# Patient Record
Sex: MALE | Race: WHITE | NOT HISPANIC OR LATINO | Employment: OTHER | URBAN - METROPOLITAN AREA
[De-identification: names, ages, dates, MRNs, and addresses within clinical notes are randomized per-mention and may not be internally consistent; named-entity substitution may affect disease eponyms.]

---

## 2018-11-20 ENCOUNTER — OFFICE VISIT (OUTPATIENT)
Dept: URGENT CARE | Facility: CLINIC | Age: 63
End: 2018-11-20
Payer: COMMERCIAL

## 2018-11-20 VITALS
WEIGHT: 188 LBS | RESPIRATION RATE: 18 BRPM | HEART RATE: 104 BPM | BODY MASS INDEX: 29.51 KG/M2 | DIASTOLIC BLOOD PRESSURE: 84 MMHG | SYSTOLIC BLOOD PRESSURE: 146 MMHG | HEIGHT: 67 IN | TEMPERATURE: 98 F | OXYGEN SATURATION: 96 %

## 2018-11-20 DIAGNOSIS — J06.9 UPPER RESPIRATORY TRACT INFECTION, UNSPECIFIED TYPE: Primary | ICD-10-CM

## 2018-11-20 PROCEDURE — G0382 LEV 3 HOSP TYPE B ED VISIT: HCPCS | Performed by: PHYSICIAN ASSISTANT

## 2018-11-20 RX ORDER — ATENOLOL 100 MG/1
50 TABLET ORAL DAILY
COMMUNITY
End: 2020-05-11 | Stop reason: HOSPADM

## 2018-11-20 RX ORDER — SIMVASTATIN 20 MG
1 TABLET ORAL DAILY
COMMUNITY
Start: 2016-01-14 | End: 2019-12-08 | Stop reason: HOSPADM

## 2018-11-20 RX ORDER — HYDROCHLOROTHIAZIDE 25 MG/1
25 TABLET ORAL DAILY
COMMUNITY
End: 2020-05-11 | Stop reason: HOSPADM

## 2018-11-20 RX ORDER — FLUTICASONE PROPIONATE 50 MCG
1 SPRAY, SUSPENSION (ML) NASAL DAILY
Qty: 16 G | Refills: 0 | Status: SHIPPED | OUTPATIENT
Start: 2018-11-20 | End: 2020-05-11 | Stop reason: HOSPADM

## 2018-11-20 RX ORDER — LISINOPRIL 40 MG/1
40 TABLET ORAL DAILY
COMMUNITY
End: 2020-07-09 | Stop reason: SDUPTHER

## 2018-11-20 RX ORDER — DEXTROMETHORPHAN HYDROBROMIDE AND PROMETHAZINE HYDROCHLORIDE 15; 6.25 MG/5ML; MG/5ML
5 SYRUP ORAL 4 TIMES DAILY PRN
Qty: 118 ML | Refills: 0 | Status: SHIPPED | OUTPATIENT
Start: 2018-11-20 | End: 2019-12-08 | Stop reason: HOSPADM

## 2018-11-20 RX ORDER — AMLODIPINE BESYLATE 10 MG/1
10 TABLET ORAL DAILY
COMMUNITY
End: 2020-10-16 | Stop reason: SDUPTHER

## 2018-11-20 NOTE — PATIENT INSTRUCTIONS
Coricidin otc for congestion, can use afrin nasal for 3 days  Follow up with PCP in 5-7  Proceed to  ER if symptoms worsen

## 2018-11-20 NOTE — PROGRESS NOTES
[facility]        NAME: Helen Medina is a 58 y o  male  : 1955    MRN: [id number]  DATE: 2018  TIME: 11:18 AM    Assessment and Plan   Upper respiratory tract infection, unspecified type [J06 9]  1  Upper respiratory tract infection, unspecified type  promethazine-dextromethorphan (PHENERGAN-DM) 6 25-15 mg/5 mL oral syrup    fluticasone (FLONASE) 50 mcg/act nasal spray         Patient Instructions     Coricidin otc for congestion, can use afrin nasal for 3 days  Follow up with PCP in 5-7  Proceed to  ER if symptoms worsen  Chief Complaint     Chief Complaint   Patient presents with    Cough     x 4 days, taking mucinex    Sore Throat    Nasal Congestion    Generalized Body Aches         History of Present Illness         17-year-old male presents with cough and congestion for 4 days  Cough is productive with yellow mucus and associated with a sore throat  It is not associated shortness of breath  He complains of sinus pressure, postnasal drip and ear fullness bilaterally  He complains of body aches  He denies fever/chills, nausea / vomiting  Is currently a smoker          Review of Systems   Review of Systems      Current Medications       Current Outpatient Prescriptions:     amLODIPine (NORVASC) 10 mg tablet, Take 10 mg by mouth daily, Disp: , Rfl:     atenolol (TENORMIN) 100 mg tablet, Take 50 mg by mouth daily, Disp: , Rfl:     hydrochlorothiazide (HYDRODIURIL) 25 mg tablet, Take 25 mg by mouth daily, Disp: , Rfl:     lisinopril (ZESTRIL) 40 mg tablet, Take 40 mg by mouth daily, Disp: , Rfl:     simvastatin (ZOCOR) 20 mg tablet, Take 1 tablet by mouth daily, Disp: , Rfl:     fluticasone (FLONASE) 50 mcg/act nasal spray, 1 spray into each nostril daily, Disp: 16 g, Rfl: 0    promethazine-dextromethorphan (PHENERGAN-DM) 6 25-15 mg/5 mL oral syrup, Take 5 mL by mouth 4 (four) times a day as needed for cough, Disp: 118 mL, Rfl: 0    Current Allergies Allergies as of 11/20/2018    (No Known Allergies)            The following portions of the patient's history were reviewed and updated as appropriate: allergies, current medications, past family history, past medical history, past social history, past surgical history and problem list      Past Medical History:   Diagnosis Date    Hyperlipidemia     Hypertension        Past Surgical History:   Procedure Laterality Date    KNEE SURGERY Right        Family History   Problem Relation Age of Onset    Diabetes Mother     Hyperlipidemia Father          Medications have been verified  Objective   /84 (BP Location: Left arm, Patient Position: Sitting, Cuff Size: Standard)   Pulse 104   Temp 98 °F (36 7 °C) (Tympanic)   Resp 18   Ht 5' 7" (1 702 m)   Wt 85 3 kg (188 lb)   SpO2 96%   BMI 29 44 kg/m²        Physical Exam     Physical Exam   Constitutional: He appears well-developed and well-nourished  No distress  HENT:   Head: Normocephalic and atraumatic  Right Ear: Tympanic membrane, external ear and ear canal normal    Left Ear: Tympanic membrane, external ear and ear canal normal    Nose: Mucosal edema present  No rhinorrhea  Right sinus exhibits no maxillary sinus tenderness and no frontal sinus tenderness  Left sinus exhibits no maxillary sinus tenderness and no frontal sinus tenderness  Mouth/Throat: Mucous membranes are not dry  No oropharyngeal exudate  Congestion, post nasal drip   Eyes: Right eye exhibits no discharge  Left eye exhibits no discharge  No scleral icterus  Neck: Normal range of motion  Neck supple  Cardiovascular: Normal rate and regular rhythm  Exam reveals no gallop and no friction rub  No murmur heard  Pulmonary/Chest: Effort normal and breath sounds normal  No respiratory distress  He has no wheezes  He has no rales  Lymphadenopathy:     He has no cervical adenopathy  Neurological: He is alert  Skin: Skin is warm and dry  He is not diaphoretic  Psychiatric: He has a normal mood and affect   His behavior is normal

## 2019-09-24 ENCOUNTER — APPOINTMENT (EMERGENCY)
Dept: RADIOLOGY | Facility: HOSPITAL | Age: 64
End: 2019-09-24

## 2019-09-24 ENCOUNTER — HOSPITAL ENCOUNTER (EMERGENCY)
Facility: HOSPITAL | Age: 64
Discharge: HOME | End: 2019-09-24
Attending: EMERGENCY MEDICINE

## 2019-09-24 ENCOUNTER — APPOINTMENT (EMERGENCY)
Dept: CT IMAGING | Facility: HOSPITAL | Age: 64
End: 2019-09-24

## 2019-09-24 VITALS
TEMPERATURE: 98.2 F | DIASTOLIC BLOOD PRESSURE: 78 MMHG | SYSTOLIC BLOOD PRESSURE: 178 MMHG | BODY MASS INDEX: 29.03 KG/M2 | RESPIRATION RATE: 16 BRPM | OXYGEN SATURATION: 98 % | WEIGHT: 185 LBS | HEART RATE: 62 BPM | HEIGHT: 67 IN

## 2019-09-24 DIAGNOSIS — R42 VERTIGO: Primary | ICD-10-CM

## 2019-09-24 LAB
ALBUMIN SERPL BCP-MCNC: 4.3 G/DL (ref 3.5–5)
ALP SERPL-CCNC: 66 U/L (ref 46–116)
ALT SERPL W P-5'-P-CCNC: 105 U/L (ref 12–78)
ANION GAP SERPL CALCULATED.3IONS-SCNC: 10 MMOL/L (ref 4–13)
AST SERPL W P-5'-P-CCNC: 56 U/L (ref 5–45)
ATRIAL RATE: 60 BPM
BASOPHILS # BLD AUTO: 0.07 THOUSANDS/ΜL (ref 0–0.1)
BASOPHILS NFR BLD AUTO: 1 % (ref 0–1)
BILIRUB SERPL-MCNC: 0.4 MG/DL (ref 0.2–1)
BUN SERPL-MCNC: 32 MG/DL (ref 5–25)
CALCIUM SERPL-MCNC: 10.1 MG/DL (ref 8.3–10.1)
CHLORIDE SERPL-SCNC: 97 MMOL/L (ref 100–108)
CO2 SERPL-SCNC: 28 MMOL/L (ref 21–32)
CREAT SERPL-MCNC: 1.33 MG/DL (ref 0.6–1.3)
EOSINOPHIL # BLD AUTO: 0.3 THOUSAND/ΜL (ref 0–0.61)
EOSINOPHIL NFR BLD AUTO: 3 % (ref 0–6)
ERYTHROCYTE [DISTWIDTH] IN BLOOD BY AUTOMATED COUNT: 12.8 % (ref 11.6–15.1)
GFR SERPL CREATININE-BSD FRML MDRD: 56 ML/MIN/1.73SQ M
GLUCOSE SERPL-MCNC: 213 MG/DL (ref 65–140)
HCT VFR BLD AUTO: 44.4 % (ref 36.5–49.3)
HGB BLD-MCNC: 14.7 G/DL (ref 12–17)
IMM GRANULOCYTES # BLD AUTO: 0.1 THOUSAND/UL (ref 0–0.2)
IMM GRANULOCYTES NFR BLD AUTO: 1 % (ref 0–2)
LYMPHOCYTES # BLD AUTO: 2.54 THOUSANDS/ΜL (ref 0.6–4.47)
LYMPHOCYTES NFR BLD AUTO: 22 % (ref 14–44)
MCH RBC QN AUTO: 31.7 PG (ref 26.8–34.3)
MCHC RBC AUTO-ENTMCNC: 33.1 G/DL (ref 31.4–37.4)
MCV RBC AUTO: 96 FL (ref 82–98)
MONOCYTES # BLD AUTO: 1.29 THOUSAND/ΜL (ref 0.17–1.22)
MONOCYTES NFR BLD AUTO: 11 % (ref 4–12)
NEUTROPHILS # BLD AUTO: 7.54 THOUSANDS/ΜL (ref 1.85–7.62)
NEUTS SEG NFR BLD AUTO: 62 % (ref 43–75)
NRBC BLD AUTO-RTO: 0 /100 WBCS
P AXIS: 75 DEGREES
PLATELET # BLD AUTO: 153 THOUSANDS/UL (ref 149–390)
PMV BLD AUTO: 12.4 FL (ref 8.9–12.7)
POTASSIUM SERPL-SCNC: 4.2 MMOL/L (ref 3.5–5.3)
PR INTERVAL: 192 MS
PROT SERPL-MCNC: 8 G/DL (ref 6.4–8.2)
QRS AXIS: -6 DEGREES
QRSD INTERVAL: 148 MS
QT INTERVAL: 468 MS
QTC INTERVAL: 468 MS
RBC # BLD AUTO: 4.64 MILLION/UL (ref 3.88–5.62)
SODIUM SERPL-SCNC: 135 MMOL/L (ref 136–145)
T WAVE AXIS: 88 DEGREES
TROPONIN I SERPL-MCNC: <0.02 NG/ML
VENTRICULAR RATE: 60 BPM
WBC # BLD AUTO: 11.84 THOUSAND/UL (ref 4.31–10.16)

## 2019-09-24 PROCEDURE — 36415 COLL VENOUS BLD VENIPUNCTURE: CPT | Performed by: EMERGENCY MEDICINE

## 2019-09-24 PROCEDURE — 80053 COMPREHEN METABOLIC PANEL: CPT | Performed by: EMERGENCY MEDICINE

## 2019-09-24 PROCEDURE — 96361 HYDRATE IV INFUSION ADD-ON: CPT

## 2019-09-24 PROCEDURE — 99285 EMERGENCY DEPT VISIT HI MDM: CPT

## 2019-09-24 PROCEDURE — 70498 CT ANGIOGRAPHY NECK: CPT

## 2019-09-24 PROCEDURE — 99285 EMERGENCY DEPT VISIT HI MDM: CPT | Performed by: EMERGENCY MEDICINE

## 2019-09-24 PROCEDURE — 93005 ELECTROCARDIOGRAM TRACING: CPT

## 2019-09-24 PROCEDURE — 70496 CT ANGIOGRAPHY HEAD: CPT

## 2019-09-24 PROCEDURE — 84484 ASSAY OF TROPONIN QUANT: CPT | Performed by: EMERGENCY MEDICINE

## 2019-09-24 PROCEDURE — 96360 HYDRATION IV INFUSION INIT: CPT

## 2019-09-24 PROCEDURE — 85025 COMPLETE CBC W/AUTO DIFF WBC: CPT | Performed by: EMERGENCY MEDICINE

## 2019-09-24 PROCEDURE — 71046 X-RAY EXAM CHEST 2 VIEWS: CPT

## 2019-09-24 PROCEDURE — 93010 ELECTROCARDIOGRAM REPORT: CPT | Performed by: INTERNAL MEDICINE

## 2019-09-24 RX ORDER — MECLIZINE HYDROCHLORIDE 25 MG/1
25 TABLET ORAL ONCE
Status: COMPLETED | OUTPATIENT
Start: 2019-09-24 | End: 2019-09-24

## 2019-09-24 RX ORDER — ASPIRIN 81 MG/1
81 TABLET, CHEWABLE ORAL DAILY
Qty: 20 TABLET | Refills: 0 | Status: SHIPPED | OUTPATIENT
Start: 2019-09-24

## 2019-09-24 RX ADMIN — MECLIZINE HYDROCHLORIDE 25 MG: 25 TABLET ORAL at 14:31

## 2019-09-24 RX ADMIN — SODIUM CHLORIDE 1000 ML: 0.9 INJECTION, SOLUTION INTRAVENOUS at 14:33

## 2019-09-24 RX ADMIN — IOHEXOL 85 ML: 350 INJECTION, SOLUTION INTRAVENOUS at 15:07

## 2019-10-09 NOTE — ED PROVIDER NOTES
History  Chief Complaint   Patient presents with    Dizziness     pt states it feels like the room is spinning since 0900 this morning     51-year-old male presenting to the emergency department for evaluation of room spinning sensation  Patient has had this sensation since on o'clock this morning  States it is worse with movement  Has some ringing in the ears as well  No headaches, no nausea vomiting, no numbness weakness or tingling  Prior to Admission Medications   Prescriptions Last Dose Informant Patient Reported? Taking? amLODIPine (NORVASC) 10 mg tablet  Self Yes No   Sig: Take 10 mg by mouth daily   atenolol (TENORMIN) 100 mg tablet  Self Yes No   Sig: Take 50 mg by mouth daily   fluticasone (FLONASE) 50 mcg/act nasal spray   No No   Si spray into each nostril daily   hydrochlorothiazide (HYDRODIURIL) 25 mg tablet  Self Yes No   Sig: Take 25 mg by mouth daily   lisinopril (ZESTRIL) 40 mg tablet  Self Yes No   Sig: Take 40 mg by mouth daily   promethazine-dextromethorphan (PHENERGAN-DM) 6 25-15 mg/5 mL oral syrup   No No   Sig: Take 5 mL by mouth 4 (four) times a day as needed for cough   simvastatin (ZOCOR) 20 mg tablet   Yes No   Sig: Take 1 tablet by mouth daily      Facility-Administered Medications: None       Past Medical History:   Diagnosis Date    Hyperlipidemia     Hypertension        Past Surgical History:   Procedure Laterality Date    KNEE SURGERY Right        Family History   Problem Relation Age of Onset    Diabetes Mother     Hyperlipidemia Father      I have reviewed and agree with the history as documented  Social History     Tobacco Use    Smoking status: Current Some Day Smoker     Packs/day: 0 50    Smokeless tobacco: Never Used   Substance Use Topics    Alcohol use: Yes     Comment: 2 mixed drinks/day    Drug use: Not Currently        Review of Systems   Constitutional: Negative for appetite change, chills, fatigue and fever     HENT: Negative for sneezing and sore throat  Eyes: Negative for visual disturbance  Respiratory: Negative for cough, choking, chest tightness, shortness of breath and wheezing  Cardiovascular: Negative for chest pain and palpitations  Gastrointestinal: Negative for abdominal pain, constipation, diarrhea, nausea and vomiting  Genitourinary: Negative for difficulty urinating and dysuria  Neurological: Positive for dizziness  Negative for weakness, light-headedness, numbness and headaches  All other systems reviewed and are negative  Physical Exam  Physical Exam   Constitutional: He is oriented to person, place, and time  He appears well-developed and well-nourished  No distress  HENT:   Head: Normocephalic and atraumatic  Mouth/Throat: Oropharynx is clear and moist    Eyes: Pupils are equal, round, and reactive to light  EOM are normal    Neck: No JVD present  No tracheal deviation present  Cardiovascular: Normal rate, regular rhythm, normal heart sounds and intact distal pulses  Exam reveals no gallop and no friction rub  No murmur heard  Pulmonary/Chest: Effort normal and breath sounds normal  No respiratory distress  He has no wheezes  He has no rales  Abdominal: Soft  Bowel sounds are normal  He exhibits no distension  There is no tenderness  There is no rebound and no guarding  Neurological: He is alert and oriented to person, place, and time  No cranial nerve deficit  He exhibits normal muscle tone  Cranial nerves 2-12 are intact  Strength is 5/5 in all extremities  HINTS reveals some fatigable horizontal nystagmus  Skin: Skin is warm and dry  He is not diaphoretic  No pallor  Psychiatric: He has a normal mood and affect  His behavior is normal    Nursing note and vitals reviewed        Vital Signs  ED Triage Vitals [09/24/19 1155]   Temperature Pulse Respirations Blood Pressure SpO2   98 2 °F (36 8 °C) 63 18 165/87 97 %      Temp Source Heart Rate Source Patient Position - Orthostatic VS BP Location FiO2 (%)   Oral Monitor Sitting Left arm --      Pain Score       No Pain           Vitals:    09/24/19 1155 09/24/19 1713   BP: 165/87 (!) 178/78   Pulse: 63 62   Patient Position - Orthostatic VS: Sitting Sitting         Visual Acuity  Visual Acuity      Most Recent Value   L Pupil Size (mm)  3   R Pupil Size (mm)  3          ED Medications  Medications   sodium chloride 0 9 % bolus 1,000 mL (0 mL Intravenous Stopped 9/24/19 1711)   meclizine (ANTIVERT) tablet 25 mg (25 mg Oral Given 9/24/19 1431)   iohexol (OMNIPAQUE) 350 MG/ML injection (MULTI-DOSE) 85 mL (85 mL Intravenous Given 9/24/19 1507)       Diagnostic Studies  Results Reviewed     Procedure Component Value Units Date/Time    Troponin I [[id number]]  (Normal) Collected:  09/24/19 1422    Lab Status:  Final result Specimen:  Blood from Arm, Right Updated:  09/24/19 1449     Troponin I <0 02 ng/mL     Comprehensive metabolic panel [[id number]]  (Abnormal) Collected:  09/24/19 1422    Lab Status:  Final result Specimen:  Blood from Arm, Right Updated:  09/24/19 1447     Sodium 135 mmol/L      Potassium 4 2 mmol/L      Chloride 97 mmol/L      CO2 28 mmol/L      ANION GAP 10 mmol/L      BUN 32 mg/dL      Creatinine 1 33 mg/dL      Glucose 213 mg/dL      Calcium 10 1 mg/dL      AST 56 U/L       U/L      Alkaline Phosphatase 66 U/L      Total Protein 8 0 g/dL      Albumin 4 3 g/dL      Total Bilirubin 0 40 mg/dL      eGFR 56 ml/min/1 73sq m     Narrative:       Cece guidelines for Chronic Kidney Disease (CKD):     Stage 1 with normal or high GFR (GFR > 90 mL/min/1 73 square meters)    Stage 2 Mild CKD (GFR = 60-89 mL/min/1 73 square meters)    Stage 3A Moderate CKD (GFR = 45-59 mL/min/1 73 square meters)    Stage 3B Moderate CKD (GFR = 30-44 mL/min/1 73 square meters)    Stage 4 Severe CKD (GFR = 15-29 mL/min/1 73 square meters)    Stage 5 End Stage CKD (GFR <15 mL/min/1 73 square meters)  Note: GFR calculation is accurate only with a steady state creatinine    CBC and differential [[id number]]  (Abnormal) Collected:  09/24/19 1422    Lab Status:  Final result Specimen:  Blood from Arm, Right Updated:  09/24/19 1429     WBC 11 84 Thousand/uL      RBC 4 64 Million/uL      Hemoglobin 14 7 g/dL      Hematocrit 44 4 %      MCV 96 fL      MCH 31 7 pg      MCHC 33 1 g/dL      RDW 12 8 %      MPV 12 4 fL      Platelets 508 Thousands/uL      nRBC 0 /100 WBCs      Neutrophils Relative 62 %      Immat GRANS % 1 %      Lymphocytes Relative 22 %      Monocytes Relative 11 %      Eosinophils Relative 3 %      Basophils Relative 1 %      Neutrophils Absolute 7 54 Thousands/µL      Immature Grans Absolute 0 10 Thousand/uL      Lymphocytes Absolute 2 54 Thousands/µL      Monocytes Absolute 1 29 Thousand/µL      Eosinophils Absolute 0 30 Thousand/µL      Basophils Absolute 0 07 Thousands/µL                  CTA head and neck with and without contrast   Final Result by Son Soliz DO (09/24 5928)      Atherosclerotic disease of both distal common carotid arteries and proximal cervical internal carotid arteries  There is approximately 70-75% stenosis at the origin of the internal carotid artery bilaterally  Mild intracranial atherosclerotic disease of the internal carotid arteries involving the cavernous and supraclinoid segments with mild narrowing  Workstation performed: [id number]         XR chest 2 views   Final Result by Magalys Bingham MD (09/24 1647)      No acute cardiopulmonary disease              Workstation performed: [id number]                    Procedures  Procedures       ED Course  ED Course as of Oct 10 1143   Tue Sep 24, 2019   1645 Spoke with Dr Iván reyes of Neurology, reviewed results of the CTA as well as blood work and the patient's clinical status and neurologic exam   Stated that it was very low likelihood to be an acute CVA with an otherwise normal neurologic exam, despite the risk factors identified here today still probably stable for outpatient follow-up, would recommend starting on a daily baby aspirin, and will see the patient in the office  HEART Risk Score      Most Recent Value   History  0 Filed at: 09/24/2019 1418   ECG  1 Filed at: 09/24/2019 1418   Age  1 Filed at: 09/24/2019 1418   Risk Factors  1 Filed at: 09/24/2019 1418   Troponin  0 Filed at: 09/24/2019 1418   Heart Score Risk Calculator   History  0 Filed at: 09/24/2019 1418   ECG  1 Filed at: 09/24/2019 1418   Age  1 Filed at: 09/24/2019 1418   Risk Factors  1 Filed at: 09/24/2019 1418   Troponin  0 Filed at: 09/24/2019 1418   HEART Score  3 Filed at: 09/24/2019 1418   HEART Score  3 Filed at: 09/24/2019 1418                            [facility]  Number of Diagnoses or Management Options  Vertigo:   Diagnosis management comments: 58-year-old male with vertigo, more likely peripheral, will obtain CTA, discussed with Neurology  Disposition  Final diagnoses:   Vertigo     Time reflects when diagnosis was documented in both MDM as applicable and the Disposition within this note     Time User Action Codes Description Comment    9/24/2019  4:46 PM Hari Botello Add [R42] Vertigo       ED Disposition     ED Disposition Condition Date/Time Comment    Discharge Stable Tue Sep 24, 2019  4:46 PM Berny Douglass discharge to home/self care  Follow-up Information     Follow up With Specialties Details Why Contact Info Additional [id number] [place] [facility] [employer] [facility]   [address] [facility]       [address]  Neurology   [address]  [address] [place] [address] Neurology [address], [address], [place], [address] [place] [address]          Discharge Medication List as of 9/24/2019  4:47 PM      START taking these medications    Details   aspirin 81 mg chewable tablet Chew 1 tablet (81 mg total) daily, Starting Tue 9/24/2019, Normal         CONTINUE these medications which have NOT CHANGED    Details   amLODIPine (NORVASC) 10 mg tablet Take 10 mg by mouth daily, Historical Med      atenolol (TENORMIN) 100 mg tablet Take 50 mg by mouth daily, Historical Med      fluticasone (FLONASE) 50 mcg/act nasal spray 1 spray into each nostril daily, Starting Tue 11/20/2018, Normal      hydrochlorothiazide (HYDRODIURIL) 25 mg tablet Take 25 mg by mouth daily, Historical Med      lisinopril (ZESTRIL) 40 mg tablet Take 40 mg by mouth daily, Historical Med      promethazine-dextromethorphan (PHENERGAN-DM) 6 25-15 mg/5 mL oral syrup Take 5 mL by mouth 4 (four) times a day as needed for cough, Starting Tue 11/20/2018, Normal      simvastatin (ZOCOR) 20 mg tablet Take 1 tablet by mouth daily, Starting Thu 1/14/2016, Historical Med           No discharge procedures on file      ED Provider  Electronically Signed by           Arnaldo Garcia MD  10/10/19 5837

## 2019-12-05 ENCOUNTER — APPOINTMENT (EMERGENCY)
Dept: CT IMAGING | Facility: HOSPITAL | Age: 64
DRG: 638 | End: 2019-12-05

## 2019-12-05 ENCOUNTER — HOSPITAL ENCOUNTER (INPATIENT)
Facility: HOSPITAL | Age: 64
LOS: 3 days | Discharge: HOME | DRG: 638 | End: 2019-12-08
Attending: EMERGENCY MEDICINE | Admitting: INTERNAL MEDICINE

## 2019-12-05 DIAGNOSIS — E86.0 DEHYDRATION: ICD-10-CM

## 2019-12-05 DIAGNOSIS — N17.9 AKI (ACUTE KIDNEY INJURY) (HCC): ICD-10-CM

## 2019-12-05 DIAGNOSIS — E11.00 HYPEROSMOLAR NON-KETOTIC STATE IN PATIENT WITH TYPE 2 DIABETES MELLITUS (HCC): Primary | ICD-10-CM

## 2019-12-05 DIAGNOSIS — E78.5 DYSLIPIDEMIA: ICD-10-CM

## 2019-12-05 PROBLEM — I10 ESSENTIAL HYPERTENSION: Status: ACTIVE | Noted: 2019-12-05

## 2019-12-05 PROBLEM — E87.0 HYPEROSMOLAR HYPONATREMIA: Status: ACTIVE | Noted: 2019-12-05

## 2019-12-05 PROBLEM — E87.1 HYPEROSMOLAR HYPONATREMIA: Status: ACTIVE | Noted: 2019-12-05

## 2019-12-05 PROBLEM — R74.8 ABNORMAL TRANSAMINASES: Status: ACTIVE | Noted: 2019-12-05

## 2019-12-05 PROBLEM — D72.829 LEUKOCYTOSIS: Status: ACTIVE | Noted: 2019-12-05

## 2019-12-05 LAB
ALBUMIN SERPL BCP-MCNC: 3.8 G/DL (ref 3.5–5)
ALP SERPL-CCNC: 118 U/L (ref 46–116)
ALT SERPL W P-5'-P-CCNC: 75 U/L (ref 12–78)
ANION GAP SERPL CALCULATED.3IONS-SCNC: 11 MMOL/L (ref 4–13)
ANION GAP SERPL CALCULATED.3IONS-SCNC: 15 MMOL/L (ref 4–13)
ANION GAP SERPL CALCULATED.3IONS-SCNC: 9 MMOL/L (ref 4–13)
APTT PPP: 29 SECONDS (ref 23–37)
AST SERPL W P-5'-P-CCNC: 25 U/L (ref 5–45)
ATRIAL RATE: 60 BPM
BACTERIA UR QL AUTO: ABNORMAL /HPF
BASE EX.OXY STD BLDV CALC-SCNC: 45.4 % (ref 60–80)
BASE EXCESS BLDV CALC-SCNC: -3.4 MMOL/L
BASOPHILS # BLD AUTO: 0.05 THOUSANDS/ΜL (ref 0–0.1)
BASOPHILS NFR BLD AUTO: 0 % (ref 0–1)
BETA-HYDROXYBUTYRATE: 1.1 MMOL/L
BILIRUB SERPL-MCNC: 0.6 MG/DL (ref 0.2–1)
BILIRUB UR QL STRIP: NEGATIVE
BUN SERPL-MCNC: 66 MG/DL (ref 5–25)
BUN SERPL-MCNC: 81 MG/DL (ref 5–25)
BUN SERPL-MCNC: 98 MG/DL (ref 5–25)
CALCIUM SERPL-MCNC: 10.2 MG/DL (ref 8.3–10.1)
CALCIUM SERPL-MCNC: 8.4 MG/DL (ref 8.3–10.1)
CALCIUM SERPL-MCNC: 9.6 MG/DL (ref 8.3–10.1)
CHLORIDE SERPL-SCNC: 79 MMOL/L (ref 100–108)
CHLORIDE SERPL-SCNC: 99 MMOL/L (ref 100–108)
CHLORIDE SERPL-SCNC: 99 MMOL/L (ref 100–108)
CLARITY UR: CLEAR
CO2 SERPL-SCNC: 22 MMOL/L (ref 21–32)
CO2 SERPL-SCNC: 23 MMOL/L (ref 21–32)
CO2 SERPL-SCNC: 26 MMOL/L (ref 21–32)
COLOR UR: YELLOW
CREAT SERPL-MCNC: 1.73 MG/DL (ref 0.6–1.3)
CREAT SERPL-MCNC: 2.11 MG/DL (ref 0.6–1.3)
CREAT SERPL-MCNC: 2.91 MG/DL (ref 0.6–1.3)
EOSINOPHIL # BLD AUTO: 0.14 THOUSAND/ΜL (ref 0–0.61)
EOSINOPHIL NFR BLD AUTO: 1 % (ref 0–6)
ERYTHROCYTE [DISTWIDTH] IN BLOOD BY AUTOMATED COUNT: 12.5 % (ref 11.6–15.1)
GFR SERPL CREATININE-BSD FRML MDRD: 22 ML/MIN/1.73SQ M
GFR SERPL CREATININE-BSD FRML MDRD: 32 ML/MIN/1.73SQ M
GFR SERPL CREATININE-BSD FRML MDRD: 41 ML/MIN/1.73SQ M
GLUCOSE SERPL-MCNC: 1012 MG/DL (ref 65–140)
GLUCOSE SERPL-MCNC: 269 MG/DL (ref 65–140)
GLUCOSE SERPL-MCNC: 329 MG/DL (ref 65–140)
GLUCOSE SERPL-MCNC: 331 MG/DL (ref 65–140)
GLUCOSE SERPL-MCNC: 352 MG/DL (ref 65–140)
GLUCOSE SERPL-MCNC: 378 MG/DL (ref 65–140)
GLUCOSE SERPL-MCNC: 431 MG/DL (ref 65–140)
GLUCOSE SERPL-MCNC: 439 MG/DL (ref 65–140)
GLUCOSE SERPL-MCNC: 464 MG/DL (ref 65–140)
GLUCOSE SERPL-MCNC: 495 MG/DL (ref 65–140)
GLUCOSE SERPL-MCNC: >500 MG/DL (ref 65–140)
GLUCOSE UR STRIP-MCNC: ABNORMAL MG/DL
HCO3 BLDV-SCNC: 24 MMOL/L (ref 24–30)
HCT VFR BLD AUTO: 46.3 % (ref 36.5–49.3)
HGB BLD-MCNC: 14.9 G/DL (ref 12–17)
HGB UR QL STRIP.AUTO: NEGATIVE
IMM GRANULOCYTES # BLD AUTO: 0.1 THOUSAND/UL (ref 0–0.2)
IMM GRANULOCYTES NFR BLD AUTO: 1 % (ref 0–2)
INR PPP: 0.95 (ref 0.84–1.19)
KETONES UR STRIP-MCNC: NEGATIVE MG/DL
LEUKOCYTE ESTERASE UR QL STRIP: ABNORMAL
LYMPHOCYTES # BLD AUTO: 1.59 THOUSANDS/ΜL (ref 0.6–4.47)
LYMPHOCYTES NFR BLD AUTO: 12 % (ref 14–44)
MAGNESIUM SERPL-MCNC: 2.7 MG/DL (ref 1.6–2.6)
MCH RBC QN AUTO: 30.2 PG (ref 26.8–34.3)
MCHC RBC AUTO-ENTMCNC: 32.2 G/DL (ref 31.4–37.4)
MCV RBC AUTO: 94 FL (ref 82–98)
MONOCYTES # BLD AUTO: 0.96 THOUSAND/ΜL (ref 0.17–1.22)
MONOCYTES NFR BLD AUTO: 7 % (ref 4–12)
NEUTROPHILS # BLD AUTO: 10.9 THOUSANDS/ΜL (ref 1.85–7.62)
NEUTS SEG NFR BLD AUTO: 79 % (ref 43–75)
NITRITE UR QL STRIP: NEGATIVE
NON-SQ EPI CELLS URNS QL MICRO: ABNORMAL /HPF
NRBC BLD AUTO-RTO: 0 /100 WBCS
O2 CT BLDV-SCNC: 9 ML/DL
P AXIS: 70 DEGREES
PCO2 BLDV: 52.2 MM HG (ref 42–50)
PH BLDV: 7.28 [PH] (ref 7.3–7.4)
PH UR STRIP.AUTO: 5.5 [PH]
PHOSPHATE SERPL-MCNC: 5.6 MG/DL (ref 2.3–4.1)
PLATELET # BLD AUTO: 142 THOUSANDS/UL (ref 149–390)
PLATELET # BLD AUTO: 155 THOUSANDS/UL (ref 149–390)
PMV BLD AUTO: 13.8 FL (ref 8.9–12.7)
PMV BLD AUTO: 13.8 FL (ref 8.9–12.7)
PO2 BLDV: 24.9 MM HG (ref 35–45)
POTASSIUM SERPL-SCNC: 3.7 MMOL/L (ref 3.5–5.3)
POTASSIUM SERPL-SCNC: 4.2 MMOL/L (ref 3.5–5.3)
POTASSIUM SERPL-SCNC: 5.9 MMOL/L (ref 3.5–5.3)
PR INTERVAL: 176 MS
PROT SERPL-MCNC: 8 G/DL (ref 6.4–8.2)
PROT UR STRIP-MCNC: NEGATIVE MG/DL
PROTHROMBIN TIME: 12.7 SECONDS (ref 11.6–14.5)
QRS AXIS: -19 DEGREES
QRSD INTERVAL: 124 MS
QT INTERVAL: 442 MS
QTC INTERVAL: 442 MS
RBC # BLD AUTO: 4.93 MILLION/UL (ref 3.88–5.62)
RBC #/AREA URNS AUTO: ABNORMAL /HPF
SODIUM SERPL-SCNC: 116 MMOL/L (ref 136–145)
SODIUM SERPL-SCNC: 131 MMOL/L (ref 136–145)
SODIUM SERPL-SCNC: 136 MMOL/L (ref 136–145)
SP GR UR STRIP.AUTO: 1.01 (ref 1–1.03)
T WAVE AXIS: 106 DEGREES
TROPONIN I SERPL-MCNC: <0.02 NG/ML
TSH SERPL DL<=0.05 MIU/L-ACNC: 1.87 UIU/ML (ref 0.36–3.74)
UROBILINOGEN UR QL STRIP.AUTO: 0.2 E.U./DL
VENTRICULAR RATE: 60 BPM
WBC # BLD AUTO: 13.74 THOUSAND/UL (ref 4.31–10.16)
WBC #/AREA URNS AUTO: ABNORMAL /HPF

## 2019-12-05 PROCEDURE — 80053 COMPREHEN METABOLIC PANEL: CPT | Performed by: EMERGENCY MEDICINE

## 2019-12-05 PROCEDURE — 85730 THROMBOPLASTIN TIME PARTIAL: CPT | Performed by: EMERGENCY MEDICINE

## 2019-12-05 PROCEDURE — 84484 ASSAY OF TROPONIN QUANT: CPT | Performed by: EMERGENCY MEDICINE

## 2019-12-05 PROCEDURE — 74176 CT ABD & PELVIS W/O CONTRAST: CPT

## 2019-12-05 PROCEDURE — 87040 BLOOD CULTURE FOR BACTERIA: CPT | Performed by: INTERNAL MEDICINE

## 2019-12-05 PROCEDURE — 82805 BLOOD GASES W/O2 SATURATION: CPT | Performed by: EMERGENCY MEDICINE

## 2019-12-05 PROCEDURE — 85610 PROTHROMBIN TIME: CPT | Performed by: EMERGENCY MEDICINE

## 2019-12-05 PROCEDURE — 70450 CT HEAD/BRAIN W/O DYE: CPT

## 2019-12-05 PROCEDURE — 81001 URINALYSIS AUTO W/SCOPE: CPT | Performed by: EMERGENCY MEDICINE

## 2019-12-05 PROCEDURE — 96361 HYDRATE IV INFUSION ADD-ON: CPT

## 2019-12-05 PROCEDURE — 84100 ASSAY OF PHOSPHORUS: CPT | Performed by: EMERGENCY MEDICINE

## 2019-12-05 PROCEDURE — 84443 ASSAY THYROID STIM HORMONE: CPT | Performed by: INTERNAL MEDICINE

## 2019-12-05 PROCEDURE — 84484 ASSAY OF TROPONIN QUANT: CPT | Performed by: INTERNAL MEDICINE

## 2019-12-05 PROCEDURE — 83735 ASSAY OF MAGNESIUM: CPT | Performed by: EMERGENCY MEDICINE

## 2019-12-05 PROCEDURE — 82948 REAGENT STRIP/BLOOD GLUCOSE: CPT

## 2019-12-05 PROCEDURE — 82010 KETONE BODYS QUAN: CPT | Performed by: EMERGENCY MEDICINE

## 2019-12-05 PROCEDURE — 93005 ELECTROCARDIOGRAM TRACING: CPT

## 2019-12-05 PROCEDURE — 85049 AUTOMATED PLATELET COUNT: CPT | Performed by: INTERNAL MEDICINE

## 2019-12-05 PROCEDURE — 71250 CT THORAX DX C-: CPT

## 2019-12-05 PROCEDURE — 99223 1ST HOSP IP/OBS HIGH 75: CPT | Performed by: INTERNAL MEDICINE

## 2019-12-05 PROCEDURE — 36415 COLL VENOUS BLD VENIPUNCTURE: CPT | Performed by: EMERGENCY MEDICINE

## 2019-12-05 PROCEDURE — 85025 COMPLETE CBC W/AUTO DIFF WBC: CPT | Performed by: EMERGENCY MEDICINE

## 2019-12-05 PROCEDURE — 96374 THER/PROPH/DIAG INJ IV PUSH: CPT

## 2019-12-05 PROCEDURE — 90471 IMMUNIZATION ADMIN: CPT | Performed by: INTERNAL MEDICINE

## 2019-12-05 PROCEDURE — 99285 EMERGENCY DEPT VISIT HI MDM: CPT

## 2019-12-05 PROCEDURE — 80048 BASIC METABOLIC PNL TOTAL CA: CPT | Performed by: INTERNAL MEDICINE

## 2019-12-05 PROCEDURE — 99285 EMERGENCY DEPT VISIT HI MDM: CPT | Performed by: EMERGENCY MEDICINE

## 2019-12-05 PROCEDURE — 93010 ELECTROCARDIOGRAM REPORT: CPT | Performed by: INTERNAL MEDICINE

## 2019-12-05 PROCEDURE — 90682 RIV4 VACC RECOMBINANT DNA IM: CPT | Performed by: INTERNAL MEDICINE

## 2019-12-05 RX ORDER — HEPARIN SODIUM 5000 [USP'U]/ML
5000 INJECTION, SOLUTION INTRAVENOUS; SUBCUTANEOUS EVERY 8 HOURS SCHEDULED
Status: DISCONTINUED | OUTPATIENT
Start: 2019-12-05 | End: 2019-12-08 | Stop reason: HOSPADM

## 2019-12-05 RX ORDER — SODIUM CHLORIDE 9 MG/ML
3 INJECTION INTRAVENOUS AS NEEDED
Status: DISCONTINUED | OUTPATIENT
Start: 2019-12-05 | End: 2019-12-08 | Stop reason: HOSPADM

## 2019-12-05 RX ORDER — NICOTINE 21 MG/24HR
1 PATCH, TRANSDERMAL 24 HOURS TRANSDERMAL DAILY
Status: DISCONTINUED | OUTPATIENT
Start: 2019-12-05 | End: 2019-12-08 | Stop reason: HOSPADM

## 2019-12-05 RX ORDER — FLUTICASONE PROPIONATE 50 MCG
1 SPRAY, SUSPENSION (ML) NASAL DAILY
Status: DISCONTINUED | OUTPATIENT
Start: 2019-12-05 | End: 2019-12-08 | Stop reason: HOSPADM

## 2019-12-05 RX ORDER — ATENOLOL 50 MG/1
50 TABLET ORAL DAILY
Status: DISCONTINUED | OUTPATIENT
Start: 2019-12-05 | End: 2019-12-08 | Stop reason: HOSPADM

## 2019-12-05 RX ORDER — INSULIN GLARGINE 100 [IU]/ML
10 INJECTION, SOLUTION SUBCUTANEOUS ONCE
Status: COMPLETED | OUTPATIENT
Start: 2019-12-05 | End: 2019-12-05

## 2019-12-05 RX ORDER — SODIUM CHLORIDE 9 MG/ML
100 INJECTION, SOLUTION INTRAVENOUS CONTINUOUS
Status: DISCONTINUED | OUTPATIENT
Start: 2019-12-05 | End: 2019-12-07

## 2019-12-05 RX ORDER — ONDANSETRON 2 MG/ML
4 INJECTION INTRAMUSCULAR; INTRAVENOUS EVERY 6 HOURS PRN
Status: DISCONTINUED | OUTPATIENT
Start: 2019-12-05 | End: 2019-12-08 | Stop reason: HOSPADM

## 2019-12-05 RX ORDER — AMLODIPINE BESYLATE 10 MG/1
10 TABLET ORAL DAILY
Status: DISCONTINUED | OUTPATIENT
Start: 2019-12-05 | End: 2019-12-08 | Stop reason: HOSPADM

## 2019-12-05 RX ORDER — ASPIRIN 81 MG/1
81 TABLET, CHEWABLE ORAL DAILY
Status: DISCONTINUED | OUTPATIENT
Start: 2019-12-05 | End: 2019-12-08 | Stop reason: HOSPADM

## 2019-12-05 RX ORDER — INSULIN GLARGINE 100 [IU]/ML
15 INJECTION, SOLUTION SUBCUTANEOUS
Status: DISCONTINUED | OUTPATIENT
Start: 2019-12-05 | End: 2019-12-06

## 2019-12-05 RX ORDER — ACETAMINOPHEN 325 MG/1
650 TABLET ORAL EVERY 6 HOURS PRN
Status: DISCONTINUED | OUTPATIENT
Start: 2019-12-05 | End: 2019-12-08 | Stop reason: HOSPADM

## 2019-12-05 RX ADMIN — SODIUM CHLORIDE 15 UNITS/HR: 9 INJECTION, SOLUTION INTRAVENOUS at 14:30

## 2019-12-05 RX ADMIN — INSULIN LISPRO 12 UNITS: 100 INJECTION, SOLUTION INTRAVENOUS; SUBCUTANEOUS at 20:18

## 2019-12-05 RX ADMIN — HEPARIN SODIUM 5000 UNITS: 5000 INJECTION INTRAVENOUS; SUBCUTANEOUS at 22:06

## 2019-12-05 RX ADMIN — INSULIN GLARGINE 15 UNITS: 100 INJECTION, SOLUTION SUBCUTANEOUS at 22:05

## 2019-12-05 RX ADMIN — SODIUM CHLORIDE 1000 ML: 0.9 INJECTION, SOLUTION INTRAVENOUS at 13:05

## 2019-12-05 RX ADMIN — SODIUM CHLORIDE 200 ML/HR: 0.9 INJECTION, SOLUTION INTRAVENOUS at 22:44

## 2019-12-05 RX ADMIN — INFLUENZA A VIRUS A/BRISBANE/02/2018 (H1N1) RECOMBINANT HEMAGGLUTININ ANTIGEN, INFLUENZA A VIRUS A/KANSAS/14/2017 (H3N2) RECOMBINANT HEMAGGLUTININ ANTIGEN, INFLUENZA B VIRUS B/PHUKET/3073/2013 RECOMBINANT HEMAGGLUTININ ANTIGEN, AND INFLUENZA B VIRUS B/MARYLAND/15/2016 RECOMBINANT HEMAGGLUTININ ANTIGEN 0.5 ML: 45; 45; 45; 45 INJECTION INTRAMUSCULAR at 22:04

## 2019-12-05 RX ADMIN — SODIUM CHLORIDE 1000 ML: 0.9 INJECTION, SOLUTION INTRAVENOUS at 10:36

## 2019-12-05 RX ADMIN — INSULIN LISPRO 12 UNITS: 100 INJECTION, SOLUTION INTRAVENOUS; SUBCUTANEOUS at 15:31

## 2019-12-05 RX ADMIN — SODIUM CHLORIDE 1000 ML: 0.9 INJECTION, SOLUTION INTRAVENOUS at 11:36

## 2019-12-05 RX ADMIN — SODIUM CHLORIDE 200 ML/HR: 0.9 INJECTION, SOLUTION INTRAVENOUS at 15:30

## 2019-12-05 RX ADMIN — HEPARIN SODIUM 5000 UNITS: 5000 INJECTION INTRAVENOUS; SUBCUTANEOUS at 16:41

## 2019-12-05 RX ADMIN — INSULIN HUMAN 10 UNITS: 100 INJECTION, SOLUTION PARENTERAL at 12:14

## 2019-12-05 RX ADMIN — INSULIN GLARGINE 10 UNITS: 100 INJECTION, SOLUTION SUBCUTANEOUS at 15:29

## 2019-12-05 NOTE — ED PROVIDER NOTES
History  Chief Complaint   Patient presents with    Altered Mental Status     brought in by spouse with c/o periods over the last 2 weeks of lethargy, confusion, dizziness and not eating     58 y/o male presents to the ED for altered mental status  Patient's wife reports that over the last 2 weeks- he has had intermittent confusion, slurred speech, and unsteady gait  States that he has been generally week and fatigued as well  Denies any focal n/t/w of his extremities  Patient states that he has also had increased thirst and urinary frequency  He has been noted to have a decreased appetite and about 10 lb weight loss over the last few days  He states that he saw his PCP last week for a cough and was diagnosed with bronchitis- he was given steroids and antibiotics at that time but did not complete the course due to worsening symptoms  He states that he has had blurry vision as well  Denies any headaches or recent falls  No hx of DM in the past- states that he was told he is a prediabetic but is not on any medications for it  He denies any cp, sob, n/v, or d/c  Does state that he has had mild intermittent diffuse abd pain  History provided by:  Patient  Altered Mental Status   Presenting symptoms: confusion and disorientation    Severity:  Moderate  Episode history:  Continuous  Duration:  2 weeks  Timing:  Intermittent  Chronicity:  New  Context: not alcohol use, not drug use, not head injury, taking medications as prescribed and not recent change in medication    Associated symptoms: abdominal pain, slurred speech and visual change    Associated symptoms: no agitation, no fever, no headaches, no nausea, no rash, no vomiting and no weakness        Prior to Admission Medications   Prescriptions Last Dose Informant Patient Reported? Taking?    amLODIPine (NORVASC) 10 mg tablet  Self Yes No   Sig: Take 10 mg by mouth daily   aspirin 81 mg chewable tablet   No No   Sig: Chew 1 tablet (81 mg total) daily   atenolol (TENORMIN) 100 mg tablet  Self Yes No   Sig: Take 50 mg by mouth daily   fluticasone (FLONASE) 50 mcg/act nasal spray   No No   Si spray into each nostril daily   hydrochlorothiazide (HYDRODIURIL) 25 mg tablet  Self Yes No   Sig: Take 25 mg by mouth daily   lisinopril (ZESTRIL) 40 mg tablet  Self Yes No   Sig: Take 40 mg by mouth daily   promethazine-dextromethorphan (PHENERGAN-DM) 6 25-15 mg/5 mL oral syrup   No No   Sig: Take 5 mL by mouth 4 (four) times a day as needed for cough   simvastatin (ZOCOR) 20 mg tablet   Yes No   Sig: Take 1 tablet by mouth daily      Facility-Administered Medications: None       Past Medical History:   Diagnosis Date    Hyperlipidemia     Hypertension        Past Surgical History:   Procedure Laterality Date    KNEE SURGERY Right        Family History   Problem Relation Age of Onset    Diabetes Mother     Hyperlipidemia Father      I have reviewed and agree with the history as documented  Social History     Tobacco Use    Smoking status: Current Some Day Smoker     Packs/day: 0 50    Smokeless tobacco: Never Used   Substance Use Topics    Alcohol use: Yes     Comment: 2 mixed drinks/day    Drug use: Not Currently        Review of Systems   Constitutional: Negative for chills and fever  HENT: Negative for congestion, ear pain and sore throat  Eyes: Negative for pain and visual disturbance  Respiratory: Negative for cough, shortness of breath and wheezing  Cardiovascular: Negative for chest pain and leg swelling  Gastrointestinal: Positive for abdominal pain  Negative for diarrhea, nausea and vomiting  Genitourinary: Negative for dysuria, frequency, hematuria and urgency  Musculoskeletal: Negative for neck pain and neck stiffness  Skin: Negative for rash and wound  Neurological: Positive for speech difficulty  Negative for weakness, numbness and headaches  Psychiatric/Behavioral: Positive for confusion  Negative for agitation     All other systems reviewed and are negative  Physical Exam  Physical Exam   Constitutional: He is oriented to person, place, and time  He appears well-developed and well-nourished  HENT:   Head: Normocephalic and atraumatic  Mouth/Throat: Mucous membranes are dry  Eyes: Pupils are equal, round, and reactive to light  EOM are normal    Neck: Normal range of motion  Neck supple  Cardiovascular: Normal rate and regular rhythm  Pulmonary/Chest: Effort normal and breath sounds normal  No stridor  No respiratory distress  He has no wheezes  He has no rales  Abdominal: Soft  Bowel sounds are normal  He exhibits no distension  There is no tenderness  Musculoskeletal: Normal range of motion  Neurological: He is alert and oriented to person, place, and time  No focal deficits  No slurred speech on exam    Skin: Skin is warm and dry  Nursing note and vitals reviewed        Vital Signs  ED Triage Vitals   Temperature Pulse Respirations Blood Pressure SpO2   12/05/19 0956 12/05/19 0952 12/05/19 0951 12/05/19 0952 12/05/19 0952   97 8 °F (36 6 °C) 65 18 107/55 97 %      Temp Source Heart Rate Source Patient Position - Orthostatic VS BP Location FiO2 (%)   12/05/19 0956 12/05/19 0951 12/05/19 0951 12/05/19 0951 --   Oral Monitor Sitting Left arm       Pain Score       --                  Vitals:    12/05/19 1330 12/05/19 1400 12/05/19 1430 12/05/19 1515   BP: 110/65 107/64 123/59 122/85   Pulse: 58 (!) 54 58 58   Patient Position - Orthostatic VS: Lying Lying Lying          Visual Acuity  Visual Acuity      Most Recent Value   L Pupil Size (mm)  4   R Pupil Size (mm)  4          ED Medications  Medications   sodium chloride (PF) 0 9 % injection 3 mL (has no administration in time range)   sodium chloride 0 9 % infusion (200 mL/hr Intravenous New Bag 12/5/19 1530)   insulin glargine (LANTUS) subcutaneous injection 15 Units 0 15 mL (has no administration in time range)   insulin lispro (HumaLOG) 100 units/mL subcutaneous injection 2-12 Units (12 Units Subcutaneous Given 12/5/19 1531)   amLODIPine (NORVASC) tablet 10 mg (10 mg Oral Not Given 12/5/19 1610)   aspirin chewable tablet 81 mg (81 mg Oral Refused 12/5/19 1611)   atenolol (TENORMIN) tablet 50 mg (50 mg Oral Refused 12/5/19 1611)   fluticasone (FLONASE) 50 mcg/act nasal spray 1 spray (1 spray Nasal Refused 12/5/19 1611)   nicotine (NICODERM CQ) 21 mg/24 hr TD 24 hr patch 1 patch (1 patch Transdermal Not Given 12/5/19 1612)   heparin (porcine) subcutaneous injection 5,000 Units (has no administration in time range)   acetaminophen (TYLENOL) tablet 650 mg (has no administration in time range)   ondansetron (ZOFRAN) injection 4 mg (has no administration in time range)   sodium chloride 0 9 % bolus 1,000 mL (0 mL Intravenous Stopped 12/5/19 1303)   sodium chloride 0 9 % bolus 1,000 mL (0 mL Intravenous Stopped 12/5/19 1136)   insulin regular (HumuLIN R,NovoLIN R) injection 10 Units (10 Units Intravenous Given 12/5/19 1214)   sodium chloride 0 9 % bolus 1,000 mL (0 mL Intravenous Stopped 12/5/19 1521)   insulin glargine (LANTUS) subcutaneous injection 10 Units 0 1 mL (10 Units Subcutaneous Given 12/5/19 1529)       Diagnostic Studies  Results Reviewed     Procedure Component Value Units Date/Time    Troponin I [[id number]] Collected:  12/05/19 1632    Lab Status:  No result Specimen:  Blood from Arm, Left     Basic metabolic panel [[id number]] Collected:  12/05/19 1632    Lab Status:  No result Specimen:  Blood from Arm, Left     Fingerstick Glucose (POCT) [[id number]]  (Abnormal) Collected:  12/05/19 1505    Lab Status:  Final result Updated:  12/05/19 1507     POC Glucose >500 mg/dl     Troponin I [[id number]]     Lab Status:  No result Specimen:  Blood     Blood culture [[id number]]     Lab Status:  No result Specimen:  Blood     Blood culture [[id number]]     Lab Status:  No result Specimen:  Blood     Hemoglobin A1c w/EAG Estimation (Orders if not completed within the last 90 days) [[id number]]     Lab Status:  No result Specimen:  Blood     Fingerstick Glucose (POCT) [[id number]]  (Abnormal) Collected:  12/05/19 1422    Lab Status:  Final result Updated:  12/05/19 1423     POC Glucose >500 mg/dl     Urine Microscopic [[id number]]  (Abnormal) Collected:  12/05/19 1119    Lab Status:  Final result Specimen:  Urine, Other Updated:  12/05/19 1206     RBC, UA None Seen /hpf      WBC, UA 0-1 /hpf      Epithelial Cells Occasional /hpf      Bacteria, UA Occasional /hpf     UA w Reflex to Microscopic w Reflex to Culture [[id number]]  (Abnormal) Collected:  12/05/19 1119    Lab Status:  Final result Specimen:  Urine, Other Updated:  12/05/19 1135     Color, UA Yellow     Clarity, UA Clear     Specific Gravity, UA 1 010     pH, UA 5 5     Leukocytes, UA Elevated glucose may cause decreased leukocyte values   See urine microscopic for [facility] result/     Nitrite, UA Negative     Protein, UA Negative mg/dl      Glucose, UA >=1000 (1%) mg/dl      Ketones, UA Negative mg/dl      Urobilinogen, UA 0 2 E U /dl      Bilirubin, UA Negative     Blood, UA Negative    Comprehensive metabolic panel [[id number]]  (Abnormal) Collected:  12/05/19 1036    Lab Status:  Final result Specimen:  Blood from Arm, Right Updated:  12/05/19 1118     Sodium 116 mmol/L      Potassium 5 9 mmol/L      Chloride 79 mmol/L      CO2 26 mmol/L      ANION GAP 11 mmol/L      BUN 98 mg/dL      Creatinine 2 91 mg/dL      Glucose 1,012 mg/dL      Calcium 10 2 mg/dL      AST 25 U/L      ALT 75 U/L      Alkaline Phosphatase 118 U/L      Total Protein 8 0 g/dL      Albumin 3 8 g/dL      Total Bilirubin 0 60 mg/dL      eGFR 22 ml/min/1 73sq m     Narrative:       Cece guidelines for Chronic Kidney Disease (CKD):     Stage 1 with normal or high GFR (GFR > 90 mL/min/1 73 square meters)    Stage 2 Mild CKD (GFR = 60-89 mL/min/1 73 square meters)    Stage 3A Moderate CKD (GFR = 45-59 mL/min/1 73 square meters)    Stage 3B Moderate CKD (GFR = 30-44 mL/min/1 73 square meters)    Stage 4 Severe CKD (GFR = 15-29 mL/min/1 73 square meters)    Stage 5 End Stage CKD (GFR <15 mL/min/1 73 square meters)  Note: GFR calculation is accurate only with a steady state creatinine    Magnesium [[id number]]  (Abnormal) Collected:  12/05/19 1036    Lab Status:  Final result Specimen:  Blood from Arm, Right Updated:  12/05/19 1115     Magnesium 2 7 mg/dL     Phosphorus [[id number]]  (Abnormal) Collected:  12/05/19 1036    Lab Status:  Final result Specimen:  Blood from Arm, Right Updated:  12/05/19 1115     Phosphorus 5 6 mg/dL     Troponin I [[id number]]  (Normal) Collected:  12/05/19 1036    Lab Status:  Final result Specimen:  Blood from Arm, Right Updated:  12/05/19 1110     Troponin I <0 02 ng/mL     Protime-INR [[id number]]  (Normal) Collected:  12/05/19 1036    Lab Status:  Final result Specimen:  Blood from Arm, Right Updated:  12/05/19 1103     Protime 12 7 seconds      INR 0 95    APTT [[id number]]  (Normal) Collected:  12/05/19 1036    Lab Status:  Final result Specimen:  Blood from Arm, Right Updated:  12/05/19 1103     PTT 29 seconds     CBC and differential [[id number]]  (Abnormal) Collected:  12/05/19 1036    Lab Status:  Final result Specimen:  Blood from Arm, Right Updated:  12/05/19 1055     WBC 13 74 Thousand/uL      RBC 4 93 Million/uL      Hemoglobin 14 9 g/dL      Hematocrit 46 3 %      MCV 94 fL      MCH 30 2 pg      MCHC 32 2 g/dL      RDW 12 5 %      MPV 13 8 fL      Platelets 169 Thousands/uL      nRBC 0 /100 WBCs      Neutrophils Relative 79 %      Immat GRANS % 1 %      Lymphocytes Relative 12 %      Monocytes Relative 7 %      Eosinophils Relative 1 %      Basophils Relative 0 %      Neutrophils Absolute 10 90 Thousands/µL      Immature Grans Absolute 0 10 Thousand/uL      Lymphocytes Absolute 1 59 Thousands/µL      Monocytes Absolute 0 96 Thousand/µL      Eosinophils Absolute 0 14 Thousand/µL      Basophils Absolute 0 05 Thousands/µL     Beta Hydroxybutyrate [[id number]]  (Abnormal) Collected:  12/05/19 1036    Lab Status:  Final result Specimen:  Blood from Arm, Right Updated:  12/05/19 1052     BETA-HYDROXYBUTYRATE 1 1 mmol/L     Blood gas, venous [[id number]]  (Abnormal) Collected:  12/05/19 1036    Lab Status:  Final result Specimen:  Blood from Arm, Right Updated:  12/05/19 1050     pH, Doug 7 280     pCO2, Doug 52 2 mm Hg      pO2, Doug 24 9 mm Hg      HCO3, Doug 24 0 mmol/L      Base Excess, Doug -3 4 mmol/L      O2 Content, Doug 9 0 ml/dL      O2 HGB, VENOUS 45 4 %     Fingerstick Glucose (POCT) [[id number]]  (Abnormal) Collected:  12/05/19 1011    Lab Status:  Final result Updated:  12/05/19 1014     POC Glucose >500 mg/dl                  CT head without contrast   Final Result by Tu Velez DO (12/05 1144)      No acute intracranial abnormality  Workstation performed: [id number]         CT chest abdomen pelvis wo contrast   Final Result by Tu Velez DO (12/05 1213)      No acute cardiopulmonary disease  No acute intra-abdominal abnormality  Abnormal appearance of the gallbladder, completely collapsed and/or sludge filled  No pericholecystic inflammatory changes however  Follow-up ultrasound after adequate fasting may be obtained for clarification  Enlarged fatty liver with subcapsular sparing right hepatic lobe  Prostatomegaly  Colonic diverticulosis without evidence of diverticulitis        Limited study without oral or IV contrast          Workstation performed: [id number]                    Procedures  ECG 12 Lead Documentation Only  Date/Time: 12/5/2019 10:15 AM  Performed by: Catie Hammond DO  Authorized by: Catie Hammond DO     Indications / Diagnosis:  AMS   Patient location:  ED  Previous ECG:     Previous ECG:  Compared to current    Comparison ECG info:  9/24/19    Similarity:  No change  Rate:     ECG rate:  60    ECG rate assessment: normal    Rhythm: Rhythm: sinus rhythm    QRS:     QRS axis:  Left    QRS intervals: Wide  Conduction:     Conduction: abnormal      Abnormal conduction: incomplete LBBB    ST segments:     ST segments: no acute changes   T waves:     T waves comment:  No acute changes              ED Course  ED Course as of Dec 05 1638   Thu Dec 05, 2019   1021 Slurred speech, confused, no appetite, x 2 weeks  Worse over the last few days  Lost 12 lbs in 1 week  Recent diagnosis last week of bronchitis- antibiotics and steroids but stopped Saturday    Increased thirst  Urinary frequency  Unsteady gait  Cough productive- x weeks  Smoker  Blurry vision  1120 Anion Gap: 11   1120 Will replete with fluids    Glucose, Random(!!): 1,012   1120 Corrected sodium is 130   Sodium(!): 116                               MDM  Number of Diagnoses or Management Options  [facility] new and requires workup  Dehydration: new and requires workup  Hyperosmolar non-ketotic state in patient with type 2 diabetes mellitus ([facility] ): new and requires workup  Diagnosis management comments: Patient with multiple complaints- noted to have an accucheck >500- will get labs including acetone, vbg, trop, ct head to r/o icb, and ct pe with abd/pel due to persistent cough and abd pain  Will give fluids and admit  Upon initial attempt to admit, VERÓNICA was concerned that patient may need higher level of care  I spoke with Dr Rupesh Gallagher who looked at patient's labs and saw patient- states that he is appropriate for the floor  Recommends continued fluids and insulin gtt on Algorithm 3  Will admit to [facility]     Patient reevaluated and feels improved  Patient updated on results of tests and plan of care including admission to hospital for further evaluation of presenting complaint  Patient demonstrates verbal understanding and agrees with plan  Report to Dr Ezequiel Rao with VERÓNICA for continuation of patient care           Amount and/or Complexity of Data Reviewed  Clinical lab tests: ordered and reviewed  Tests in the radiology section of CPT®: ordered and reviewed  Tests in the medicine section of CPT®: ordered and reviewed  Discussion of test results with the performing providers: yes  Decide to obtain previous medical records or to obtain history from someone other than the patient: yes  Obtain history from someone other than the patient: yes  Review and summarize past medical records: yes  Discuss the patient with other providers: yes  Independent visualization of images, tracings, or specimens: yes    Patient Progress  Patient progress: improved        Disposition  Final diagnoses:   Hyperosmolar non-ketotic state in patient with type 2 diabetes mellitus ([place] [address] )   JOELLE (acute kidney injury) ([place] [address] )   Dehydration     Time reflects when diagnosis was documented in both MDM as applicable and the Disposition within this note     Time User Action Codes Description Comment    12/5/2019  2:13 PM Sixto Perera 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) ([place] [address] )     12/5/2019  2:13 PM Sixto Perera Remove [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) ([place] [address] )     12/5/2019  2:14 PM Sixto Perera Add [E11 00] Hyperosmolar non-ketotic state in patient with type 2 diabetes mellitus ([place] [address] )     12/5/2019  2:14 PM Sixto Perera Add [N17 9] JOELLE (acute kidney injury) ([place] [address] )     12/5/2019  2:14 PM Sixto Perera Add [E86 0] Dehydration       ED Disposition     ED Disposition Condition Date/Time Comment    Admit Stable Thu Dec 5, 2019  2:13 PM Case was discussed with VERÓNICA and the patient's admission status was agreed to be Admission Status: inpatient status to the service of Dr Chanelle Aguirre             Follow-up Information    None         Current Discharge Medication List      CONTINUE these medications which have NOT CHANGED    Details   amLODIPine (NORVASC) 10 mg tablet Take 10 mg by mouth daily      aspirin 81 mg chewable tablet Chew 1 tablet (81 mg total) daily  Qty: 20 tablet, Refills: 0    Associated Diagnoses: Vertigo      atenolol (TENORMIN) 100 mg tablet Take 50 mg by mouth daily      fluticasone (FLONASE) 50 mcg/act nasal spray 1 spray into each nostril daily  Qty: 16 g, Refills: 0    Associated Diagnoses: Upper respiratory tract infection, unspecified type      hydrochlorothiazide (HYDRODIURIL) 25 mg tablet Take 25 mg by mouth daily      lisinopril (ZESTRIL) 40 mg tablet Take 40 mg by mouth daily      promethazine-dextromethorphan (PHENERGAN-DM) 6 25-15 mg/5 mL oral syrup Take 5 mL by mouth 4 (four) times a day as needed for cough  Qty: 118 mL, Refills: 0    Associated Diagnoses: Upper respiratory tract infection, unspecified type      simvastatin (ZOCOR) 20 mg tablet Take 1 tablet by mouth daily           No discharge procedures on file      ED Provider  Electronically Signed by           Deonte Santos DO  12/05/19 5738

## 2019-12-05 NOTE — ASSESSMENT & PLAN NOTE
Lab Results   Component Value Date    HGBA1C 15 5 (H) 12/06/2019       Recent Labs     12/06/19  2205 12/07/19  0014 12/07/19  0624 12/07/19  1115   POCGLU 183* 207* 232* 350*       Blood Sugar Average: Last 72 hrs:    Hyperosmolar nonketotic state resolved  Accu-Cheks still labile  Put him on more scheduled regular insulin  He does not have any insurance  Trying to get him to 1 of the cheapest available insulin  Discussed with pharmacy    Switched his Lantus to 70/30 insulin available here

## 2019-12-05 NOTE — ED NOTES
Per provider, redraw lactic not to be redrawn at this time  Verbal order to discontinue redraw attempt       Pamela De La Rosa RN  12/05/19 8499

## 2019-12-05 NOTE — PLAN OF CARE
Problem: Potential for Falls  Goal: Patient will remain free of falls  Description  INTERVENTIONS:  - Assess patient frequently for physical needs  -  Identify cognitive and physical deficits and behaviors that affect risk of falls    -  Alicia fall precautions as indicated by assessment   - Educate patient/family on patient safety including physical limitations  - Instruct patient to call for assistance with activity based on assessment  - Modify environment to reduce risk of injury  - Consider OT/PT consult to assist with strengthening/mobility  Outcome: Progressing

## 2019-12-05 NOTE — H&P
History and Physical - [facility]    Patient Information: Bryna Fabry 59 y o  male MRN: [id number]  Unit/Bed#: ZO Encounter: [id number]  Admitting Physician: Jake Harrison MD  PCP: Toma Jalloh  Date of Admission:  12/05/19    Assessment/Plan:      * Hyperosmolar non-ketotic state in patient with type 2 diabetes mellitus [facility])  Assessment & Plan  No results found for: HGBA1C    Recent Labs     12/05/19  1011 12/05/19  1422 12/05/19  1505   POCGLU >500* >500* >500*       Blood Sugar Average: Last 72 hrs:    Patient with family history of diabetes mellitus, and without himself having any history of diabetes mellitus, is here with hyperosmolar nonketotic state  He already has received quite a bit of insulin  Also received IV fluids  He basically needs fluids more than anything else in addition to some insulin  Put him on sliding scale and also long-acting insulin  Give him IV saline and monitor labs every 4 hours from now  Would also monitor him on tele monitor with his hyperkalemia and other electrolytes abnormalities  Leukocytosis  Assessment & Plan  Likely related to stress/reactive  Would hold off on giving him any antibiotics  Continue to monitor  Abnormal transaminases  Assessment & Plan  Plan as above    Dyslipidemia  Assessment & Plan  Has elevated AST/ALT  He had elevated these in the past as well  Would hold his statin  Check lipid profile  May need further workup if levels continue to be abnormal    Hyperosmolar hyponatremia  Assessment & Plan  His corrected sodium is almost 130  Follow-up labs  Has pseudohyponatremia mostly  Would also hold his diuretic    Essential hypertension  Assessment & Plan  Continue some of his home medications including beta-blocker/narcotics  Hold others like ACE-inhibitor/diuretics in the setting of acute kidney injury with hyperkalemia  JOELLE (acute kidney injury) [facility])  Assessment & Plan  Likely hemodynamic    He has some blood work done in September of this year  His creatinine was 1 33  May have underlying chronic kidney disease, however, do not have any other blood work to compare with  His acute kidney injury likely secondary to dehydration and poor oral intake in addition to polyuria  Give him IV fluids and follow-up labs closely  Hold nephrotoxin medications including diuretics/ACE-inhibitor at this moment  Present on Admission:   Hyperosmolar non-ketotic state in patient with type 2 diabetes mellitus ([facility] [address] )   JOELLE (acute kidney injury) ([facility] )   Essential hypertension   Hyperosmolar hyponatremia   Dyslipidemia   Abnormal transaminases   Leukocytosis        VTE Prophylaxis: Heparin  / sequential compression device   Code Status:  Full code  POLST: There is no POLST form on file for this patient (pre-hospital)    Anticipated Length of Stay:  Patient will be admitted on an Inpatient basis with an anticipated length of stay of  more than 2 midnights  Justification for Hospital Stay:  Hyper smaller nonketotic state    Total Time for Visit, including Counseling / Coordination of Care: 50+ minutes  Greater than 50% of this total time spent on direct patient counseling and coordination of care  Chief Complaint:   Not feeling well/confused/polyuria/polydipsia    History of Present Illness:    Vikci Rollins is a 59 y o  male who presents with multiple complaints  Spouse is here at bedside  Patient is not really confused except that he is not really feeling well and generally weak for the last week or so  He was having some bronchitis symptoms and SI a primary care physician who put him on antibiotics in addition to steroids  He did not take all of them as he was not feeling well with continued polyuria/polydipsia  He was feeling dizzy/lightheaded  Had significantly decreased oral intake  No hematemesis, melena, hematochezia  No fever or chills  He also has been trying to quit smoking    He was put on Wellbutrin for that   Patient stop that for some time and then restarted just couple of days ago again  His mother is diabetic  Patient does not get any significant medical care on regular basis because of insurance issues  He was here to the ER not too long ago with symptoms of dizziness/vertigo  Review of Systems    All systems are reviewed  Positive as per history of presenting illness  Patient answered no to all other questions  Past Medical and Surgical History:     Past Medical History:   Diagnosis Date    Hyperlipidemia     Hypertension        Past Surgical History:   Procedure Laterality Date    KNEE SURGERY Right        Meds/Allergies:    Prior to Admission medications    Medication Sig Start Date End Date Taking? Authorizing Provider   amLODIPine (NORVASC) 10 mg tablet Take 10 mg by mouth daily    Historical Provider, MD   aspirin 81 mg chewable tablet Chew 1 tablet (81 mg total) daily 9/24/19   Mariluz Rodriguez MD   atenolol (TENORMIN) 100 mg tablet Take 50 mg by mouth daily    Historical Provider, MD   fluticasone (FLONASE) 50 mcg/act nasal spray 1 spray into each nostril daily 11/20/18   Ham Preston PA-C   hydrochlorothiazide (HYDRODIURIL) 25 mg tablet Take 25 mg by mouth daily    Historical Provider, MD   lisinopril (ZESTRIL) 40 mg tablet Take 40 mg by mouth daily    Historical Provider, MD   promethazine-dextromethorphan (PHENERGAN-DM) 6 25-15 mg/5 mL oral syrup Take 5 mL by mouth 4 (four) times a day as needed for cough 11/20/18   Ham Preston PA-C   simvastatin (ZOCOR) 20 mg tablet Take 1 tablet by mouth daily 1/14/16   Historical Provider, MD     Reviewed as documented above    Allergies: No Known Allergies    Social History:     Marital Status: /Civil Union   Occupation:  Currently not working    He is basically taking care of his parents who are elderly and sick  Patient Pre-hospital Living Situation:  With family  Patient Pre-hospital Level of Mobility:  Independent  Patient Pre-hospital Diet Restrictions:  None  Substance Use History:   Social History     Substance and Sexual Activity   Alcohol Use Yes    Comment: 2 mixed drinks/day     Social History     Tobacco Use   Smoking Status Current Some Day Smoker    Packs/day: 0 50   Smokeless Tobacco Never Used     Social History     Substance and Sexual Activity   Drug Use Not Currently       Family History:    Family history positive for diabetes mellitus  Patient's mother diabetic  Physical Exam      Vitals:   Blood Pressure: 123/59 (12/05/19 1430)  Pulse: 58 (12/05/19 1430)  Temperature: 97 8 °F (36 6 °C) (12/05/19 0956)  Temp Source: Oral (12/05/19 0956)  Respirations: 20 (12/05/19 1430)  Weight - Scale: 83 kg (182 lb 15 7 oz) (12/05/19 0951)  SpO2: 94 % (12/05/19 1430)    Vital signs are reviewed as above  Constitutional:  Patient lying in stretcher in the ER  Eyes:  EOM grossly intact  He wears glasses for reading  HENT: Oropharynx are dry   Did not notice any significant lesions on the tongue  Head normocephalic  Neck: Neck is supple  There is no significant lymphadenopathy  I also did not notice any significant thyromegaly  Cardiac: I did not hear any rubs or gallop  Patient appears to be in sinus rhythm  Respiratory: Patient not in significant respiratory distress  Air entry in general is poor  GI: Abdomen is soft  It is grossly nontender  Bowel sounds are adequate  I was not able to appreciate any hepatosplenomegaly  Neurologic:  Patient is awake and alert  Neurological examination is grossly intact  No obvious focal neurological deficit noticed  Skin: Skin is warm and dry  Psychiatric: Mood and affect are pleasant  Musculoskeletal  Patient moving all extremities while in stretcher in the ER  Has chronic arthritic joints   Extremities: Patient has no significant cyanosis, clubbing, or lower extremity edema           Additional Data:     Lab Results: I have personally reviewed pertinent reports        Results from last 7 days   Lab Units 12/05/19  1036   WBC Thousand/uL 13 74*   HEMOGLOBIN g/dL 14 9   HEMATOCRIT % 46 3   PLATELETS Thousands/uL 155   NEUTROS PCT % 79*   LYMPHS PCT % 12*   MONOS PCT % 7   EOS PCT % 1     Results from last 7 days   Lab Units 12/05/19  1036   POTASSIUM mmol/L 5 9*   CHLORIDE mmol/L 79*   CO2 mmol/L 26   BUN mg/dL 98*   CREATININE mg/dL 2 91*   CALCIUM mg/dL 10 2*   ALK PHOS U/L 118*   ALT U/L 75   AST U/L 25     Results from last 7 days   Lab Units 12/05/19  1036   INR  0 95       Imaging: I have personally reviewed pertinent reports  Ct Chest Abdomen Pelvis Wo Contrast    Result Date: 12/5/2019  Narrative: CT CHEST, ABDOMEN AND PELVIS WITHOUT IV CONTRAST INDICATION:  Cough and mid abdominal pain  COMPARISON:  Chest film 9/24/2019 TECHNIQUE: CT examination of the chest, abdomen and pelvis was performed without intravenous contrast   Axial, sagittal, and coronal 2D reformatted images were created from the source data and submitted for interpretation  Radiation dose length product (DLP) for this visit:  641 mGy-cm   This examination, like all CT scans performed in the [facility], was performed utilizing techniques to minimize radiation dose exposure, including the use of iterative reconstruction and automated exposure control  Enteric contrast was not administered  FINDINGS: CHEST The study is limited without IV contrast  LUNGS:  No consolidation or endobronchial lesions  Calcified right upper lobe granuloma  Lung cysts and/or bullae along the anterior left upper lobe  PLEURA:  Unremarkable  HEART/GREAT VESSELS:  The heart is normal size  No pericardial effusion  Atherosclerotic changes thoracic aorta and coronary arteries  MEDIASTINUM AND PIERRE:  Unremarkable  CHEST WALL AND LOWER NECK:   Unremarkable   ABDOMEN Evaluation of the solid organs is limited without IV contrast  LIVER/BILIARY TREE:  Enlarged fatty liver with patchy areas of subcapsular fatty sparing in the right hepatic lobe  GALLBLADDER:  3 cm rounded soft tissue density along the medial margin of the liver appears to have a tubular connection to the biliary tree and is believed to represent a completely collapsed or sludge filled gallbladder  No pericholecystic inflammatory changes  SPLEEN:  Unremarkable  PANCREAS:  Mild fatty involution of the pancreas without acute findings  ADRENAL GLANDS:  Unremarkable  KIDNEYS/URETERS:  Small lower pole right renal cyst   Evaluation limited without contrast   No hydronephrosis or calculi  Mild nonspecific bilateral perinephric edema  STOMACH AND BOWEL:  The stomach is unremarkable within limitations  The small bowel is normal caliber  Descending and sigmoid colon diverticulosis without evidence of diverticulitis  APPENDIX:  No findings to suggest appendicitis  ABDOMINOPELVIC CAVITY:  No ascites or free intraperitoneal air  No lymphadenopathy  VESSELS: Atherosclerotic changes abdominal aorta without evidence of aneurysm  PELVIS REPRODUCTIVE ORGANS:  The prostate is enlarged  URINARY BLADDER:  Unremarkable  ABDOMINAL WALL/INGUINAL REGIONS:  Fat containing bilateral inguinal hernias noted  OSSEOUS STRUCTURES:  No acute fracture or destructive osseous lesion  Lower lumbar facet arthropathy  Please note the L1 vertebra demonstrates a left-sided rib  Degenerative disease C6-7  Impression: No acute cardiopulmonary disease  No acute intra-abdominal abnormality  Abnormal appearance of the gallbladder, completely collapsed and/or sludge filled  No pericholecystic inflammatory changes however  Follow-up ultrasound after adequate fasting may be obtained for clarification  Enlarged fatty liver with subcapsular sparing right hepatic lobe  Prostatomegaly  Colonic diverticulosis without evidence of diverticulitis   Limited study without oral or IV contrast  Workstation performed: [id number]     Ct Head Without Contrast    Result Date: 12/5/2019  Narrative: CT BRAIN - WITHOUT CONTRAST INDICATION:   Confusion/slurred speech  COMPARISON:  CTA head and neck 9/24/2019 TECHNIQUE:  CT examination of the brain was performed  In addition to axial images, coronal 2D reformatted images were created and submitted for interpretation  Radiation dose length product (DLP) for this visit:  0699 646 28 85 mGy-cm   This examination, like all CT scans performed in the [facility], was performed utilizing techniques to minimize radiation dose exposure, including the use of iterative reconstruction and automated exposure control  IMAGE QUALITY:  Diagnostic  FINDINGS: PARENCHYMA:  No intracranial mass, mass effect or midline shift  No CT signs of acute infarction  No acute parenchymal hemorrhage  Mild to moderate cerebral atrophy  Vascular calcifications  VENTRICLES AND EXTRA-AXIAL SPACES:  Normal for the patient's age  VISUALIZED ORBITS AND PARANASAL SINUSES:  No acute abnormality involving the orbits  Mild mucosal thickening right maxillary sinus, improved from prior study  No fluid levels are seen  CALVARIUM AND EXTRACRANIAL SOFT TISSUES:  Normal      Impression: No acute intracranial abnormality  Workstation performed: [id number]       EKG, Pathology, and Other Studies Reviewed on Admission:   · EKG:  Sinus rhythm  Rate about 60  Do not see any acute ischemic changes  Allscripts Records Reviewed: No     ** Please Note: Dragon 360 Dictation voice to text software may have been used in the creation of this document   **

## 2019-12-06 LAB
ALBUMIN SERPL BCP-MCNC: 2.8 G/DL (ref 3.5–5)
ALP SERPL-CCNC: 58 U/L (ref 46–116)
ALT SERPL W P-5'-P-CCNC: 57 U/L (ref 12–78)
ANION GAP SERPL CALCULATED.3IONS-SCNC: 13 MMOL/L (ref 4–13)
AST SERPL W P-5'-P-CCNC: 29 U/L (ref 5–45)
BILIRUB SERPL-MCNC: 0.4 MG/DL (ref 0.2–1)
BUN SERPL-MCNC: 53 MG/DL (ref 5–25)
CALCIUM SERPL-MCNC: 8.1 MG/DL (ref 8.3–10.1)
CHLORIDE SERPL-SCNC: 101 MMOL/L (ref 100–108)
CHOLEST SERPL-MCNC: 283 MG/DL (ref 50–200)
CO2 SERPL-SCNC: 21 MMOL/L (ref 21–32)
CREAT SERPL-MCNC: 1.46 MG/DL (ref 0.6–1.3)
ERYTHROCYTE [DISTWIDTH] IN BLOOD BY AUTOMATED COUNT: 12.4 % (ref 11.6–15.1)
EST. AVERAGE GLUCOSE BLD GHB EST-MCNC: 398 MG/DL
GFR SERPL CREATININE-BSD FRML MDRD: 50 ML/MIN/1.73SQ M
GLUCOSE SERPL-MCNC: 174 MG/DL (ref 65–140)
GLUCOSE SERPL-MCNC: 183 MG/DL (ref 65–140)
GLUCOSE SERPL-MCNC: 209 MG/DL (ref 65–140)
GLUCOSE SERPL-MCNC: 214 MG/DL (ref 65–140)
GLUCOSE SERPL-MCNC: 215 MG/DL (ref 65–140)
GLUCOSE SERPL-MCNC: 225 MG/DL (ref 65–140)
GLUCOSE SERPL-MCNC: 234 MG/DL (ref 65–140)
GLUCOSE SERPL-MCNC: 247 MG/DL (ref 65–140)
GLUCOSE SERPL-MCNC: 268 MG/DL (ref 65–140)
GLUCOSE SERPL-MCNC: 275 MG/DL (ref 65–140)
GLUCOSE SERPL-MCNC: 290 MG/DL (ref 65–140)
GLUCOSE SERPL-MCNC: 383 MG/DL (ref 65–140)
HBA1C MFR BLD: 15.5 % (ref 4.2–6.3)
HCT VFR BLD AUTO: 37.9 % (ref 36.5–49.3)
HDLC SERPL-MCNC: 28 MG/DL
HGB BLD-MCNC: 12.3 G/DL (ref 12–17)
LIPASE SERPL-CCNC: 339 U/L (ref 73–393)
MCH RBC QN AUTO: 30.4 PG (ref 26.8–34.3)
MCHC RBC AUTO-ENTMCNC: 32.5 G/DL (ref 31.4–37.4)
MCV RBC AUTO: 94 FL (ref 82–98)
NONHDLC SERPL-MCNC: 255 MG/DL
PLATELET # BLD AUTO: 119 THOUSANDS/UL (ref 149–390)
PMV BLD AUTO: 14 FL (ref 8.9–12.7)
POTASSIUM SERPL-SCNC: 3.7 MMOL/L (ref 3.5–5.3)
PROT SERPL-MCNC: 5.9 G/DL (ref 6.4–8.2)
RBC # BLD AUTO: 4.05 MILLION/UL (ref 3.88–5.62)
SODIUM SERPL-SCNC: 135 MMOL/L (ref 136–145)
TRIGL SERPL-MCNC: 1737 MG/DL
WBC # BLD AUTO: 11.96 THOUSAND/UL (ref 4.31–10.16)

## 2019-12-06 PROCEDURE — 80053 COMPREHEN METABOLIC PANEL: CPT | Performed by: INTERNAL MEDICINE

## 2019-12-06 PROCEDURE — 83690 ASSAY OF LIPASE: CPT | Performed by: INTERNAL MEDICINE

## 2019-12-06 PROCEDURE — 85027 COMPLETE CBC AUTOMATED: CPT | Performed by: INTERNAL MEDICINE

## 2019-12-06 PROCEDURE — G8978 MOBILITY CURRENT STATUS: HCPCS

## 2019-12-06 PROCEDURE — G8979 MOBILITY GOAL STATUS: HCPCS

## 2019-12-06 PROCEDURE — 80061 LIPID PANEL: CPT | Performed by: INTERNAL MEDICINE

## 2019-12-06 PROCEDURE — 82948 REAGENT STRIP/BLOOD GLUCOSE: CPT

## 2019-12-06 PROCEDURE — 99233 SBSQ HOSP IP/OBS HIGH 50: CPT | Performed by: INTERNAL MEDICINE

## 2019-12-06 PROCEDURE — 83036 HEMOGLOBIN GLYCOSYLATED A1C: CPT | Performed by: INTERNAL MEDICINE

## 2019-12-06 PROCEDURE — 97163 PT EVAL HIGH COMPLEX 45 MIN: CPT

## 2019-12-06 RX ORDER — AMOXICILLIN 250 MG
1 CAPSULE ORAL
Status: DISCONTINUED | OUTPATIENT
Start: 2019-12-06 | End: 2019-12-08 | Stop reason: HOSPADM

## 2019-12-06 RX ORDER — POLYETHYLENE GLYCOL 3350 17 G/17G
17 POWDER, FOR SOLUTION ORAL DAILY
Status: DISCONTINUED | OUTPATIENT
Start: 2019-12-06 | End: 2019-12-08 | Stop reason: HOSPADM

## 2019-12-06 RX ORDER — INSULIN GLARGINE 100 [IU]/ML
22 INJECTION, SOLUTION SUBCUTANEOUS
Status: DISCONTINUED | OUTPATIENT
Start: 2019-12-06 | End: 2019-12-07

## 2019-12-06 RX ORDER — FENOFIBRATE 145 MG/1
145 TABLET, COATED ORAL DAILY
Status: DISCONTINUED | OUTPATIENT
Start: 2019-12-06 | End: 2019-12-08 | Stop reason: HOSPADM

## 2019-12-06 RX ADMIN — POLYETHYLENE GLYCOL 3350 17 G: 17 POWDER, FOR SOLUTION ORAL at 11:46

## 2019-12-06 RX ADMIN — INSULIN GLARGINE 22 UNITS: 100 INJECTION, SOLUTION SUBCUTANEOUS at 22:05

## 2019-12-06 RX ADMIN — HEPARIN SODIUM 5000 UNITS: 5000 INJECTION INTRAVENOUS; SUBCUTANEOUS at 05:28

## 2019-12-06 RX ADMIN — SENNOSIDES AND DOCUSATE SODIUM 1 TABLET: 8.6; 5 TABLET ORAL at 22:09

## 2019-12-06 RX ADMIN — INSULIN LISPRO 4 UNITS: 100 INJECTION, SOLUTION INTRAVENOUS; SUBCUTANEOUS at 03:46

## 2019-12-06 RX ADMIN — SODIUM CHLORIDE 100 ML/HR: 0.9 INJECTION, SOLUTION INTRAVENOUS at 20:55

## 2019-12-06 RX ADMIN — INSULIN LISPRO 6 UNITS: 100 INJECTION, SOLUTION INTRAVENOUS; SUBCUTANEOUS at 00:25

## 2019-12-06 RX ADMIN — HEPARIN SODIUM 5000 UNITS: 5000 INJECTION INTRAVENOUS; SUBCUTANEOUS at 22:09

## 2019-12-06 RX ADMIN — INSULIN LISPRO 7 UNITS: 100 INJECTION, SOLUTION INTRAVENOUS; SUBCUTANEOUS at 16:32

## 2019-12-06 RX ADMIN — INSULIN LISPRO 4 UNITS: 100 INJECTION, SOLUTION INTRAVENOUS; SUBCUTANEOUS at 08:51

## 2019-12-06 RX ADMIN — ASPIRIN 81 MG 81 MG: 81 TABLET ORAL at 08:51

## 2019-12-06 RX ADMIN — INSULIN LISPRO 7 UNITS: 100 INJECTION, SOLUTION INTRAVENOUS; SUBCUTANEOUS at 12:30

## 2019-12-06 RX ADMIN — INSULIN LISPRO 1 UNITS: 100 INJECTION, SOLUTION INTRAVENOUS; SUBCUTANEOUS at 22:12

## 2019-12-06 RX ADMIN — INSULIN LISPRO 6 UNITS: 100 INJECTION, SOLUTION INTRAVENOUS; SUBCUTANEOUS at 16:32

## 2019-12-06 RX ADMIN — FENOFIBRATE 145 MG: 145 TABLET, COATED ORAL at 09:30

## 2019-12-06 RX ADMIN — INSULIN LISPRO 6 UNITS: 100 INJECTION, SOLUTION INTRAVENOUS; SUBCUTANEOUS at 12:15

## 2019-12-06 RX ADMIN — HEPARIN SODIUM 5000 UNITS: 5000 INJECTION INTRAVENOUS; SUBCUTANEOUS at 13:29

## 2019-12-06 RX ADMIN — ATENOLOL 50 MG: 50 TABLET ORAL at 08:51

## 2019-12-06 RX ADMIN — SODIUM CHLORIDE 200 ML/HR: 0.9 INJECTION, SOLUTION INTRAVENOUS at 03:50

## 2019-12-06 RX ADMIN — AMLODIPINE BESYLATE 10 MG: 10 TABLET ORAL at 08:51

## 2019-12-06 NOTE — PHYSICAL THERAPY NOTE
PT Evaluation (17min)  (10:15-10:32)    Past Medical History:   Diagnosis Date    Hyperlipidemia     Hypertension         12/06/19 1028   Note Type   Note type Eval only   Pain Assessment   Pain Assessment No/denies pain   Home Living   Type of Home Apartment   Home Layout One level   Bathroom Shower/Tub Walk-in shower   Bathroom Toilet Standard   Prior Function   Level of [place] Independent with ADLs and functional mobility   Lives With Spouse   Receives Help From Family   ADL Assistance Independent   IADLs Independent   Falls in the last 6 months 0   Vocational Retired   Comments (+) drives   Restrictions/Precautions   Wells Stevie Bearing Precautions Per Order No   Other Precautions Multiple lines; Fall Risk   General   Additional Pertinent History pt presents to [facility] c new onset DM 2  PT consulted for mobility + d/c planning  up c (A)  Family/Caregiver Present No   Cognition   Orientation Level Oriented X4   RUE Assessment   RUE Assessment WFL  (4/5)   LUE Assessment   LUE Assessment WFL  (4/5)   RLE Assessment   RLE Assessment WFL  (4+/5)   LLE Assessment   LLE Assessment WFL  (4+/5)   Coordination   Sensation WFL   Bed Mobility   Supine to Sit 6  Modified independent   Additional items HOB elevated   Sit to Supine 6  Modified independent   Additional items HOB elevated   Transfers   Sit to Stand 5  Supervision   Additional items Verbal cues; Increased time required   Stand to Sit 5  Supervision   Additional items Verbal cues; Increased time required   Ambulation/Elevation   Gait pattern Forward Flexion;Decreased foot clearance   Gait Assistance 5  Supervision   Additional items Verbal cues   Assistive Device Other (Comment)  (IV pole)   Distance 15'x2 c seated rest for toileting; pt deferred further mobility 2* fatigue   Stair Management Assistance Not tested   Balance   Static Sitting Good   Dynamic Sitting Good   Static Standing Fair +   Dynamic Standing Fair +   Ambulatory Fair   Activity Tolerance Activity Tolerance Patient limited by fatigue   Nurse Made Aware JOSE Horne aware pt ambulated c (S) + IV pole <> bathroom  Assessment   Prognosis Good   Problem List Decreased strength;Decreased endurance; Impaired balance;Decreased mobility   Assessment pt is a 63y/o m who presents to [facility] c new onset DM 2  PMH significant for JOELLE, leukocytosis, dyslipidemia, + HTN  at baseline, pt (I) c functional mobility tasks s AD  resides c spouse in apt c 0 NATASHA  currently presents c deficits in strength, balance, gait quality, + activity tolerance noted in PT exam above  Barthel Index 60/100  ambulated 15'x2 c (S) + support of IV pole c seated rest for toileting  further mobility deferred 2* fatigue  pt resting comfortably in supine at end of session c all needs in reach  would benefit from skilled PT to maximize functional mobility + return home safely  upon d/c, recommend pt return home c family support once medically cleared by MD  will follow up c pt during hospital course  PT eval of high complexity 2* unstable med status c pt cont to undergo workup 2* new onset DM 2  pt c multiple co-morbidities + mobility deficits above  remains c multiple lines  Barriers to Discharge None   Goals   Patient Goals "to go home"  STG Expiration Date 12/13/19   Short Term Goal #1 1  increase strength 1/2 grade to improve overall functional mobility, 2  perform transfers mod (I) to safely perform ADLs, 3  ambulate >300' (I) to safely ambulate in community   Plan   Treatment/Interventions LE strengthening/ROM; Therapeutic exercise; Functional transfer training; Endurance training;Patient/family training;Bed mobility;Gait training;Spoke to nursing   PT Frequency Follow-up visit only   Recommendation   Recommendation Home with family support   PT - OK to Discharge Yes   Modified [place] Scale   Modified [place] Scale 2   Barthel Index   Feeding 10   Bathing 0   Grooming Score 5   Dressing Score 10   Bladder Score 10   Bowels Score 10 Toilet Use Score 5   Transfers (Bed/Chair) Score 10   Mobility (Level Surface) Score 0   Stairs Score 0   Barthel Index Score 60     Oral Baker, PT, DPT

## 2019-12-06 NOTE — UTILIZATION REVIEW
Initial Clinical Review    Admission: Date/Time/Statement: Inpatient Admission Orders (From admission, onward)     Ordered        12/05/19 1412  Inpatient Admission  Once                   Orders Placed This Encounter   Procedures    Inpatient Admission     Standing Status:   Standing     Number of Occurrences:   1     Order Specific Question:   Admitting Physician     Answer:   Marianela Iyer     Order Specific Question:   Level of Care     Answer:   Med Surg [16]     Order Specific Question:   Estimated length of stay     Answer:   More than 2 Midnights     Order Specific Question:   Certification     Answer:   I certify that inpatient services are medically necessary for this patient for a duration of greater than two midnights  See H&P and MD Progress Notes for additional information about the patient's course of treatment  ED Arrival Information     Expected Arrival Acuity Means of Arrival Escorted By Service Admission Type    - 12/5/2019 09:47 Emergent Walk-In Family Member Hospitalist Emergency    Arrival Complaint    AMS        Chief Complaint   Patient presents with    Altered Mental Status     brought in by spouse with c/o periods over the last 2 weeks of lethargy, confusion, dizziness and not eating     Assessment/Plan:  58 yo male,  To ER from home,  Admitted INPT status at [facility] level of care for treatment of  Hyperosmolar non-ketoacidosis w/T2DM (new dx)  and JOELLE  Most likely related to poor oral intake and polyuria  Per wife,  Pt w/intermittent confusion, slurred speech, unsteady gait over past 2 wks; She has also noticed increased thirst and urinary frequency, approx 10 lb wgt loss  Saw PCP last week, dx w/bronchitis, given steroids and abx but stopped taking them 11/29  Due to "worsening symptoms"  Denies recent alcohol use or injury to head  H/o "pre diabetes" - on no DM meds  Prior elevated AST/ALT  Prior crt on 9/2019  was 1 33       EXAM - alert and oriented to person, place, and time  No focal deficits  No slurred speech on exam   CT head neg  Accuck >500  Crt elevated   Leukocytosis (most likely reactive)   Pseudohyponatremia noted  Will place pt on telemetry,  initiate continuous insulin gtt,  Provide aggressive IVF hydration,  Monitor labs q 4 hrs;  Hold lasix & nephrotoxins (including ACE-I)            12/6    Change accucks/SSI   from q 4 hr to  Ac/HS  Add scheduled regular insulin in addition to lantus  Advance diet  Resume ACE-inhibitor when creatinine is better       ED Triage Vitals   Temperature Pulse Respirations Blood Pressure SpO2   12/05/19 0956 12/05/19 0952 12/05/19 0951 12/05/19 0952 12/05/19 0952   97 8 °F (36 6 °C) 65 18 107/55 97 %      Temp Source Heart Rate Source Patient Position - Orthostatic VS BP Location FiO2 (%)   12/05/19 0956 12/05/19 0951 12/05/19 0951 12/05/19 0951 --   Oral Monitor Sitting Left arm       Pain Score       12/05/19 1530       No Pain        Wt Readings from Last 1 Encounters:   12/06/19 83 7 kg (184 lb 8 4 oz)     Additional Vital Signs:   12/05/19 1330 58  24Abnormal   110/65   12/05/19 1100 55  23Abnormal   108/56   12/05/19 1030 57  24Abnormal   127/57     12/06/19 11:59:39  97 6 °F (36 4 °C)  64  18  105/69       Pertinent Labs/Diagnostic Test Results:     12/5  CT Head -   No acute intracranial abnormality  12/5  CTAP -  No acute cardiopulmonary disease  Enlarged fatty liver with subcapsular sparing right hepatic lobe,  Colonic diverticulosis without evidence of diverticulitis      12/5  EKG -  SR w/incomplete LBBB       Results from last 7 days   Lab Units 12/06/19  0446 12/05/19  1632 12/05/19  1036   WBC Thousand/uL 11 96*  --  13 74*   HEMOGLOBIN g/dL 12 3  --  14 9   HEMATOCRIT % 37 9  --  46 3   PLATELETS Thousands/uL 119* 142* 155   NEUTROS ABS Thousands/µL  --   --  10 90*         Results from last 7 days   Lab Units 12/06/19  0446 12/05/19 2217 12/05/19  1632 12/05/19  1036   SODIUM mmol/L 135* 131* 136 116*   POTASSIUM mmol/L 3 7 3 7 4 2 5 9*   CHLORIDE mmol/L 101 99* 99* 79*   CO2 mmol/L 21 23 22 26   ANION GAP mmol/L 13 9 15* 11   BUN mg/dL 53* 66* 81* 98*   CREATININE mg/dL 1 46* 1 73* 2 11* 2 91*   EGFR ml/min/1 73sq m 50 41 32 22   CALCIUM mg/dL 8 1* 8 4 9 6 10 2*   MAGNESIUM mg/dL  --   --   --  2 7*   PHOSPHORUS mg/dL  --   --   --  5 6*     Results from last 7 days   Lab Units 12/06/19  0446 12/05/19  1036   AST U/L 29 25   ALT U/L 57 75   ALK PHOS U/L 58 118*   TOTAL PROTEIN g/dL 5 9* 8 0   ALBUMIN g/dL 2 8* 3 8   TOTAL BILIRUBIN mg/dL 0 40 0 60     Results from last 7 days   Lab Units 12/06/19  1156 12/06/19  1032 12/06/19  0839 12/06/19  0611 12/06/19  0405 12/06/19  0338 12/06/19  0157 12/06/19  0023 12/05/19  2358 12/05/19  2212   POC GLUCOSE mg/dl 290* 383* 247* 209* 215* 214* 234* 275* 269* 329*     Results from last 7 days   Lab Units 12/06/19  0446 12/05/19  2217 12/05/19  1632 12/05/19  1036   GLUCOSE RANDOM mg/dL 225* 331* 378* 1,012*         Results from last 7 days   Lab Units 12/06/19  0937   HEMOGLOBIN A1C % 15 5*   EAG mg/dl 398     BETA-HYDROXYBUTYRATE   Date Value Ref Range Status   12/05/2019 1 1 (H) <0 6 mmol/L Final          Results from last 7 days   Lab Units 12/05/19  1036   PH MANA  7 280*   PCO2 MANA mm Hg 52 2*   PO2 MANA mm Hg 24 9*   HCO3 MANA mmol/L 24 0   BASE EXC MANA mmol/L -3 4   O2 CONTENT MANA ml/dL 9 0   O2 HGB, VENOUS % 45 4*     Results from last 7 days   Lab Units 12/05/19  1947 12/05/19  1632 12/05/19  1036   TROPONIN I ng/mL <0 02 <0 02 <0 02     Results from last 7 days   Lab Units 12/05/19  1036   PROTIME seconds 12 7   INR  0 95   PTT seconds 29     Results from last 7 days   Lab Units 12/05/19  1632 TSH 3RD GENERATON uIU/mL 1 868     Results from last 7 days   Lab Units 12/06/19  0937   LIPASE u/L 339         Results from last 7 days   Lab Units 12/05/19  1119   CLARITY UA  Clear   COLOR UA  Yellow   SPEC GRAV UA  1 010   PH UA  5 5   GLUCOSE UA mg/dl >=1000 (1%)*   KETONES UA mg/dl Negative   BLOOD UA  Negative   PROTEIN UA mg/dl Negative   NITRITE UA  Negative   BILIRUBIN UA  Negative   UROBILINOGEN UA E U /dl 0 2   LEUKOCYTES UA  Elevated glucose may cause decreased leukocyte values  See urine microscopic for [facility] result/*   WBC UA /hpf 0-1*   RBC UA /hpf None Seen   BACTERIA UA /hpf Occasional   EPITHELIAL CELLS WET PREP /hpf Occasional         Results from last 7 days   Lab Units 12/05/19  1950 12/05/19  1948   BLOOD CULTURE  Received in Microbiology Lab  Culture in Progress  Received in Microbiology Lab  Culture in Progress     ED Treatment:   Medication Administration from 12/05/2019 0947 to 12/05/2019 1512       Date/Time Order Dose Route Action     12/05/2019 1036 sodium chloride 0 9 % bolus 1,000 mL 1,000 mL Intravenous New Bag     12/05/2019 1136 sodium chloride 0 9 % bolus 1,000 mL 1,000 mL Intravenous New Bag     12/05/2019 1214 insulin regular (HumuLIN R,NovoLIN R) injection 10 Units 10 Units Intravenous Given     12/05/2019 1305 sodium chloride 0 9 % bolus 1,000 mL 1,000 mL Intravenous New Bag     12/05/2019 1430 insulin regular (HumuLIN R,NovoLIN R) 1 Units/mL in sodium chloride 0 9 % 100 mL infusion 15 Units/hr Intravenous New Bag     12/5  Continuous regular insulin gtt initiated at 1430 and dc'ed at 1518  12/5  Humalog 12 U given SC @  1530  12/5  Humalog  12 U given SC @  2018  (TOTAL 3L IVF NS bolus given in ER)        Past Medical History:   Diagnosis Date    Hyperlipidemia     Hypertension      Present on Admission:   Hyperosmolar non-ketotic state in patient with type 2 diabetes mellitus ([facility] )   JOELLE (acute kidney injury) ([facility] )   Essential hypertension   Hyperosmolar hyponatremia   Dyslipidemia   Abnormal transaminases   Leukocytosis      Admitting Diagnosis: Dehydration [E86 0]  JOELLE (acute kidney injury) ([facility] ) [N17 9]  Hyperosmolar non-ketotic state in patient with type 2 diabetes mellitus ([facility] ) [E11 00]  AMS (altered mental status) [R41 82]  Age/Sex: 59 y o  male  Admission Orders:  Telemetry;  SCD's;  PT eval and treat; IVF NS  @  100 cc/hr; Lo carb diet;      CONTINUOUS insulin gtt (dc'ed @  92 - changed to accucks q 4 hr w/Sliding scale insulin)    Scheduled Medications (12/6/2019): Medications:  amLODIPine 10 mg Oral Daily   aspirin 81 mg Oral Daily   atenolol 50 mg Oral Daily   fenofibrate 145 mg Oral Daily   fluticasone 1 spray Nasal Daily   heparin (porcine) 5,000 Units Subcutaneous Q8H [address]   insulin glargine 22 Units Subcutaneous HS   insulin lispro 1-6 Units Subcutaneous HS   insulin lispro 2-12 Units Subcutaneous TID AC   insulin lispro 7 Units Subcutaneous TID With Meals   nicotine 1 patch Transdermal Daily   polyethylene glycol 17 g Oral Daily   senna-docusate sodium 1 tablet Oral HS     Continuous IV Infusions:    sodium chloride 100 mL/hr Intravenous Continuous     PRN Meds:    acetaminophen 650 mg Oral Q6H PRN   ondansetron 4 mg Intravenous Q6H PRN   sodium chloride (PF) 3 mL Intravenous PRN         Network Utilization Review Department  [email or web address]  ATTENTION: Please call with any questions or concerns to [phone] and carefully listen to the prompts so that you are directed to the right person  All voicemails are confidential   Kelley Dickens all requests for admission clinical reviews, approved or denied determinations and any other requests to dedicated fax number below belonging to the campus where the patient is receiving treatment   List of dedicated fax numbers for the Facilities:  [address] [place] [address] DENIALS (Administrative/Medical Necessity) [phone]   [address] (Maternity/NICU/Pediatrics) [phone]     [place] [phone]   Soniya Caller [phone]   Carol Perez [phone]   Yue Cruzis [address] [facility] [place] [phone]   Keagan Hernandez [address] [phone]

## 2019-12-06 NOTE — PROGRESS NOTES
[facility] Internal Medicine Progress Note  Patient: Donna Padilla 59 y o  male   MRN: [id number]  PCP: Cynthia Caban  Unit/Bed#: -Koko Encounter: [id number]  Date Of Visit: 12/06/19          * Hyperosmolar non-ketotic state in patient with type 2 diabetes mellitus [facility])  Assessment & Plan  No results found for: HGBA1C    Recent Labs     12/06/19  0338 12/06/19  0405 12/06/19  0611 12/06/19  0839   POCGLU 214* 215* 209* 247*       Blood Sugar Average: Last 72 hrs:    Resolving  Sugars are getting better  I had ordered A1c yesterday which was not done  Again ordered this morning by staff  Continue with IV fluids, however, would cut back on the rate  Also change sliding scale to a c /HS  Add scheduled regular insulin in addition to Lantus  Also advanced diet  Leukocytosis  Assessment & Plan  Reactive and improving continue to monitor off antibiotics    Abnormal transaminases  Assessment & Plan  Improved  Monitor as needed    Dyslipidemia  Assessment & Plan  Significantly elevated triglycerides  Likely secondary to his uncontrolled sugars/diabetes mellitus  Would put him on Tricor  Also low-fat diet  Basically, his sugars need to be controlled  He is also not really very much    Hyperosmolar hyponatremia  Assessment & Plan  Improving  Continue to monitor    Essential hypertension  Assessment & Plan  Continue with beta-blocker and Norvasc  Resume ACE-inhibitor when creatinine is better    JOELLE (acute kidney injury) [facility])  Assessment & Plan  Hemodynamic and improving        Present on Admission:   Hyperosmolar non-ketotic state in patient with type 2 diabetes mellitus ([facility] )   JOELLE (acute kidney injury) ([facility] )   Essential hypertension   Hyperosmolar hyponatremia   Dyslipidemia   Abnormal transaminases   Leukocytosis            VTE Pharmacologic Prophylaxis:   Pharmacologic: Heparin  Mechanical VTE Prophylaxis in Place: Yes    Patient Centered Rounds: I have performed bedside rounds with nursing staff today  Discussions with Specialists or Other Care Team Provider:  None    Education and Discussions with Family / Patient:  Yes    Time Spent for Care: 35+ minutes  More than 50% of total time spent on counseling and coordination of care as described above  Current Length of Stay: 1 day(s)    Current Patient Status: Inpatient   Certification Statement: The patient will continue to require additional inpatient hospital stay due to Uncontrolled diabetes mellitus/acute kidney injury    Discharge Plan:  Home when stable    Code Status: Level 1 - Full Code      Subjective:   He feels much better  Has been urinating fine  No abdominal pain whatsoever  Tolerating his clear liquid diet and wants to eat more  No fever or chills  No chest pain or shortness of breath    Objective:     Vitals:   Temp (24hrs), Av 9 °F (36 6 °C), Min:97 4 °F (36 3 °C), Max:98 3 °F (36 8 °C)    Temp:  [97 4 °F (36 3 °C)-98 3 °F (36 8 °C)] 98 3 °F (36 8 °C)  HR:  [54-62] 62  Resp:  [18-24] 18  BP: ()/(44-85) 116/70  SpO2:  [91 %-96 %] 94 %  Body mass index is 28 9 kg/m²  Input and Output Summary (last 24 hours): Intake/Output Summary (Last 24 hours) at 2019 1008  Last data filed at 2019 0501  Gross per 24 hour   Intake 4000 ml   Output 2000 ml   Net 2000 ml           Physical Exam:     Vital signs are reviewed as above  Constitutional:  Sitting up in the bed  Not in any respiratory distress   Eyes: EOM grossly intact  Conjunctivae slightly pale  Patient has anicteric sclera  HENT: Oropharynx are much better hydrated  Did not notice any significant lesions on the tongue  Head normocephalic  Neck: Neck is supple  I was not able to visualize any JVD at 90°  There is no significant lymphadenopathy  I also did not notice any significant thyromegaly  Cardiac: I did not hear any rubs or gallop  Patient appears to be in sinus rhythm  Respiratory: Patient not in significant respiratory distress   Air entry in general is poor  GI: Abdomen is obese and distended and is soft  It is grossly nontender  Bowel sounds are adequate  I was not able to appreciate any hepatosplenomegaly  Neurologic:  Patient is awake and alert  Neurological examination is grossly intact  No obvious focal neurological deficit noticed  Skin: Skin is warm and dry  Psychiatric: Mood and affect are pleasant  Musculoskeletal  Patient moving all extremities while in bed  Extremities: Patient has no significant cyanosis, clubbing, or lower extremity edema       Additional Data:     Labs:    Results from last 7 days   Lab Units 12/06/19  0446  12/05/19  1036   WBC Thousand/uL 11 96*  --  13 74*   HEMOGLOBIN g/dL 12 3  --  14 9   HEMATOCRIT % 37 9  --  46 3   PLATELETS Thousands/uL 119*   < > 155   NEUTROS PCT %  --   --  79*   LYMPHS PCT %  --   --  12*   MONOS PCT %  --   --  7   EOS PCT %  --   --  1    < > = values in this interval not displayed  Results from last 7 days   Lab Units 12/06/19  0446   POTASSIUM mmol/L 3 7   CHLORIDE mmol/L 101   CO2 mmol/L 21   BUN mg/dL 53*   CREATININE mg/dL 1 46*   CALCIUM mg/dL 8 1*   ALK PHOS U/L 58   ALT U/L 57   AST U/L 29     Results from last 7 days   Lab Units 12/05/19  1036   INR  0 95       * I Have Reviewed All Lab Data Listed Above  * Additional Pertinent Lab Tests Reviewed: All Labs Within Last 24 Hours Reviewed      Recent Cultures (last 7 days):     Results from last 7 days   Lab Units 12/05/19  1950 12/05/19 1948   BLOOD CULTURE  Received in Microbiology Lab  Culture in Progress  Received in Microbiology Lab  Culture in Progress         Last 24 Hours Medication List:     Current Facility-Administered Medications:  acetaminophen 650 mg Oral Q6H PRN Letty Velez MD    amLODIPine 10 mg Oral Daily Letty Velez MD    aspirin 81 mg Oral Daily Letty Velez MD    atenolol 50 mg Oral Daily Letty Velez MD    fenofibrate 145 mg Oral Daily Letty Velez MD    fluticasone 1 spray Nasal Daily Kamini Mendez MD    heparin (porcine) 5,000 Units Subcutaneous [facility] Kamini Mendez MD    insulin glargine 22 Units Subcutaneous HS Kamini Mendez MD    insulin lispro 1-6 Units Subcutaneous HS Kamini Mendez MD    insulin lispro 2-12 Units Subcutaneous TID Aydee El MD    insulin lispro 7 Units Subcutaneous TID With Meals Kamini Mendez MD    nicotine 1 patch Transdermal Daily Kamini Mendez MD    ondansetron 4 mg Intravenous Q6H PRN Kamini Mendez MD    sodium chloride (PF) 3 mL Intravenous PRN Kamini Mendez MD    sodium chloride 100 mL/hr Intravenous Continuous Kamini Mendez MD Last Rate: 100 mL/hr (12/06/19 0930)        Today, Patient Was Seen By: Kamini Mendez MD    ** Please Note: Dragon 360 Dictation voice to text software may have been used in the creation of this document   **

## 2019-12-06 NOTE — PLAN OF CARE
Problem: PHYSICAL THERAPY ADULT  Goal: Performs mobility at highest level of function for planned discharge setting  See evaluation for individualized goals  Description  Treatment/Interventions: LE strengthening/ROM, Therapeutic exercise, Functional transfer training, Endurance training, Patient/family training, Bed mobility, Gait training, Spoke to nursing          See flowsheet documentation for full assessment, interventions and recommendations  Outcome: Progressing  Note:   Prognosis: Good  Problem List: Decreased strength, Decreased endurance, Impaired balance, Decreased mobility  Assessment: pt is a 65y/o m who presents to [facility] c new onset DM 2  PMH significant for JOELLE, leukocytosis, dyslipidemia, + HTN  at baseline, pt (I) c functional mobility tasks s AD  resides c spouse in apt c 0 NATASHA  currently presents c deficits in strength, balance, gait quality, + activity tolerance noted in PT exam above  Barthel Index 60/100  ambulated 15'x2 c (S) + support of IV pole c seated rest for toileting  further mobility deferred 2* fatigue  pt resting comfortably in supine at end of session c all needs in reach  would benefit from skilled PT to maximize functional mobility + return home safely  upon d/c, recommend pt return home c family support once medically cleared by MD  will follow up c pt during hospital course  PT eval of high complexity 2* unstable med status c pt cont to undergo workup 2* new onset DM 2  pt c multiple co-morbidities + mobility deficits above  remains c multiple lines  Barriers to Discharge: None     Recommendation: Home with family support     PT - OK to Discharge: Yes    See flowsheet documentation for full assessment

## 2019-12-06 NOTE — SOCIAL WORK
Cm met with pt at bedside  Pt lives with his wife Garland Tom in a ground floor apt with no NATASHA  Pt is able to navigate steps and is independent with ADL's  He uses no DME's  He has never been in rehab or used [facility] services  Denies substance abuse or mental health issues  He is a smoker x 40 yrs, but has cut back to 5 cigarettes per day  Pt uses [facility] in [facility] and has no problem affording his med-  he uses [facility] when needed  He does not have a POA or Advanced Directive and does not want info at this time  He does not work because he helps take care of his elderly parents  He does drive, but his wife will transport home when he is medically cleared  CM discussed d/c needs and he states that he does not have insurance  CM contacted financial for assistance  CM explained that he will need a glucometer on d/c  It will be approx $12 00 at [facility] and could be free at [phone] [employer]  This is agreeable to pt  Pt's PCP is Dr Braulio Mckinney appointment was set up for Tuesday, 12/10/19 at [address] [place] [address]   CM will follow through hospitalization  CM reviewed discharge planning process including the following: identifying help at home, patient preference for discharge planning needs, pharmacy preference, and availability of treatment team to discuss questions or concerns patient and/or family may have regarding understanding medications and recognizing signs and symptoms once discharged  CM also encouraged patient to follow up with all recommended appointments after discharge  Patient advised of importance for patient and family to participate in managing patients medical well being

## 2019-12-07 LAB
ANION GAP SERPL CALCULATED.3IONS-SCNC: 13 MMOL/L (ref 4–13)
BASOPHILS # BLD AUTO: 0.03 THOUSANDS/ΜL (ref 0–0.1)
BASOPHILS NFR BLD AUTO: 0 % (ref 0–1)
BUN SERPL-MCNC: 28 MG/DL (ref 5–25)
CALCIUM SERPL-MCNC: 8 MG/DL (ref 8.3–10.1)
CHLORIDE SERPL-SCNC: 99 MMOL/L (ref 100–108)
CO2 SERPL-SCNC: 21 MMOL/L (ref 21–32)
CREAT SERPL-MCNC: 1.2 MG/DL (ref 0.6–1.3)
EOSINOPHIL # BLD AUTO: 0.39 THOUSAND/ΜL (ref 0–0.61)
EOSINOPHIL NFR BLD AUTO: 4 % (ref 0–6)
ERYTHROCYTE [DISTWIDTH] IN BLOOD BY AUTOMATED COUNT: 12.5 % (ref 11.6–15.1)
GFR SERPL CREATININE-BSD FRML MDRD: 64 ML/MIN/1.73SQ M
GLUCOSE SERPL-MCNC: 188 MG/DL (ref 65–140)
GLUCOSE SERPL-MCNC: 207 MG/DL (ref 65–140)
GLUCOSE SERPL-MCNC: 232 MG/DL (ref 65–140)
GLUCOSE SERPL-MCNC: 260 MG/DL (ref 65–140)
GLUCOSE SERPL-MCNC: 305 MG/DL (ref 65–140)
GLUCOSE SERPL-MCNC: 350 MG/DL (ref 65–140)
HCT VFR BLD AUTO: 37.8 % (ref 36.5–49.3)
HGB BLD-MCNC: 12.3 G/DL (ref 12–17)
IMM GRANULOCYTES # BLD AUTO: 0.08 THOUSAND/UL (ref 0–0.2)
IMM GRANULOCYTES NFR BLD AUTO: 1 % (ref 0–2)
LYMPHOCYTES # BLD AUTO: 2.29 THOUSANDS/ΜL (ref 0.6–4.47)
LYMPHOCYTES NFR BLD AUTO: 25 % (ref 14–44)
MCH RBC QN AUTO: 30.8 PG (ref 26.8–34.3)
MCHC RBC AUTO-ENTMCNC: 32.5 G/DL (ref 31.4–37.4)
MCV RBC AUTO: 95 FL (ref 82–98)
MONOCYTES # BLD AUTO: 0.87 THOUSAND/ΜL (ref 0.17–1.22)
MONOCYTES NFR BLD AUTO: 9 % (ref 4–12)
NEUTROPHILS # BLD AUTO: 5.63 THOUSANDS/ΜL (ref 1.85–7.62)
NEUTS SEG NFR BLD AUTO: 61 % (ref 43–75)
NRBC BLD AUTO-RTO: 0 /100 WBCS
PLATELET # BLD AUTO: 120 THOUSANDS/UL (ref 149–390)
PMV BLD AUTO: 13.5 FL (ref 8.9–12.7)
POTASSIUM SERPL-SCNC: 3.7 MMOL/L (ref 3.5–5.3)
RBC # BLD AUTO: 3.99 MILLION/UL (ref 3.88–5.62)
SODIUM SERPL-SCNC: 133 MMOL/L (ref 136–145)
WBC # BLD AUTO: 9.29 THOUSAND/UL (ref 4.31–10.16)

## 2019-12-07 PROCEDURE — 85025 COMPLETE CBC W/AUTO DIFF WBC: CPT | Performed by: INTERNAL MEDICINE

## 2019-12-07 PROCEDURE — 80048 BASIC METABOLIC PNL TOTAL CA: CPT | Performed by: INTERNAL MEDICINE

## 2019-12-07 PROCEDURE — 99233 SBSQ HOSP IP/OBS HIGH 50: CPT | Performed by: INTERNAL MEDICINE

## 2019-12-07 PROCEDURE — 82948 REAGENT STRIP/BLOOD GLUCOSE: CPT

## 2019-12-07 RX ORDER — INSULIN ASPART 100 [IU]/ML
20 INJECTION, SUSPENSION SUBCUTANEOUS
Status: DISCONTINUED | OUTPATIENT
Start: 2019-12-07 | End: 2019-12-08

## 2019-12-07 RX ORDER — INSULIN GLARGINE 100 [IU]/ML
30 INJECTION, SOLUTION SUBCUTANEOUS
Status: DISCONTINUED | OUTPATIENT
Start: 2019-12-07 | End: 2019-12-07

## 2019-12-07 RX ADMIN — AMLODIPINE BESYLATE 10 MG: 10 TABLET ORAL at 09:00

## 2019-12-07 RX ADMIN — INSULIN LISPRO 4 UNITS: 100 INJECTION, SOLUTION INTRAVENOUS; SUBCUTANEOUS at 23:03

## 2019-12-07 RX ADMIN — INSULIN LISPRO 4 UNITS: 100 INJECTION, SOLUTION INTRAVENOUS; SUBCUTANEOUS at 08:58

## 2019-12-07 RX ADMIN — HEPARIN SODIUM 5000 UNITS: 5000 INJECTION INTRAVENOUS; SUBCUTANEOUS at 14:55

## 2019-12-07 RX ADMIN — INSULIN ASPART 20 UNITS: 100 INJECTION, SUSPENSION SUBCUTANEOUS at 17:46

## 2019-12-07 RX ADMIN — FENOFIBRATE 145 MG: 145 TABLET, COATED ORAL at 09:00

## 2019-12-07 RX ADMIN — SENNOSIDES AND DOCUSATE SODIUM 1 TABLET: 8.6; 5 TABLET ORAL at 23:02

## 2019-12-07 RX ADMIN — ASPIRIN 81 MG 81 MG: 81 TABLET ORAL at 09:00

## 2019-12-07 RX ADMIN — HEPARIN SODIUM 5000 UNITS: 5000 INJECTION INTRAVENOUS; SUBCUTANEOUS at 23:02

## 2019-12-07 RX ADMIN — ATENOLOL 50 MG: 50 TABLET ORAL at 09:00

## 2019-12-07 RX ADMIN — INSULIN LISPRO 7 UNITS: 100 INJECTION, SOLUTION INTRAVENOUS; SUBCUTANEOUS at 08:59

## 2019-12-07 RX ADMIN — INSULIN LISPRO 10 UNITS: 100 INJECTION, SOLUTION INTRAVENOUS; SUBCUTANEOUS at 12:30

## 2019-12-07 RX ADMIN — INSULIN LISPRO 2 UNITS: 100 INJECTION, SOLUTION INTRAVENOUS; SUBCUTANEOUS at 17:50

## 2019-12-07 RX ADMIN — HEPARIN SODIUM 5000 UNITS: 5000 INJECTION INTRAVENOUS; SUBCUTANEOUS at 06:02

## 2019-12-07 NOTE — PROGRESS NOTES
[facility] Progress Note  Patient: Rosalio Norris 59 y o  male   MRN: [id number]  PCP: Karlos Green  Unit/Bed#: -K[facility] Encounter: [id number]  Date Of Visit: 12/07/19          * Hyperosmolar non-ketotic state in patient with type 2 diabetes mellitus [facility])  Assessment & Plan  Lab Results   Component Value Date    HGBA1C 15 5 (H) 12/06/2019       Recent Labs     12/06/19  2205 12/07/19  0014 12/07/19  0624 12/07/19  1115   POCGLU 183* 207* 232* 350*       Blood Sugar Average: Last 72 hrs:    Hyperosmolar nonketotic state resolved  Accu-Cheks still labile  Put him on more scheduled regular insulin  He does not have any insurance  Trying to get him to 1 of the cheapest available insulin  Discussed with pharmacy  Switched his Lantus to 70/30 insulin available here    Leukocytosis  Assessment & Plan  Reactive and improving continue to monitor off antibiotics    Abnormal transaminases  Assessment & Plan  Improved  Monitor as needed    Dyslipidemia  Assessment & Plan  On treatment and low-fat diet    Hyperosmolar hyponatremia  Assessment & Plan  Sodium stable and continue to monitor as    Essential hypertension  Assessment & Plan  Continue with beta-blocker and Norvasc  Resume ACE-inhibitor when creatinine stable    JOELLE (acute kidney injury) [facility])  Assessment & Plan  Improving      Present on Admission:   Hyperosmolar non-ketotic state in patient with type 2 diabetes mellitus ([facility] [address] [facility]   JOELLE [facility]acute kidney injury) ([facility] )   Essential hypertension   Hyperosmolar hyponatremia   Dyslipidemia   Abnormal transaminases   Leukocytosis            VTE Pharmacologic Prophylaxis:   Pharmacologic: Heparin  Mechanical VTE Prophylaxis in Place: Yes    Patient Centered Rounds: I have performed bedside rounds with nursing staff today  Discussions with Specialists or Other Care Team Provider:  Yes    Education and Discussions with Family / Patient:  Yes    Time Spent for Care: 32+ minutes    More than 50% of total time spent on counseling and coordination of care as described above  Current Length of Stay: 2 day(s)    Current Patient Status: Inpatient   Certification Statement: The patient will continue to require additional inpatient hospital stay due to Hyperosmolar nonketotic state-acute kidney injury/uncontrolled diabetes mellitus    Discharge Plan:  Home hopefully soon    Code Status: Level 1 - Full Code      Subjective:   Feels much better  Had a bowel movement  No nausea or vomiting  No abdominal pain  No chest pain  No shortness of breath    Objective:     Vitals:   Temp (24hrs), Av 3 °F (36 8 °C), Min:98 3 °F (36 8 °C), Max:98 3 °F (36 8 °C)    Temp:  [98 3 °F (36 8 °C)] 98 3 °F (36 8 °C)  HR:  [64-77] 76  Resp:  [17-18] 18  BP: (112-139)/(53-69) 132/69  SpO2:  [94 %-97 %] 97 %  Body mass index is 28 73 kg/m²  Input and Output Summary (last 24 hours): Intake/Output Summary (Last 24 hours) at 2019 1539  Last data filed at 2019 1300  Gross per 24 hour   Intake 2020 ml   Output 400 ml   Net 1620 ml           Physical Exam:     Vital signs reviewed as above  Patient in bed and not in any respiratory distress  S1 and S2 audible  Awake and alert  Oriented x3  Abdomen is distended and it is nontender    Bowel sounds are audible  No cyanosis or clubbing  EOM grossly intact  Oropharynx are hydrated  Chest mostly clear to auscultation although he has poor air entry in general  Mood and affect are pleasant  Skin is warm and dry    Additional Data:     Labs:    Results from last 7 days   Lab Units 19  0454   WBC Thousand/uL 9 29   HEMOGLOBIN g/dL 12 3   HEMATOCRIT % 37 8   PLATELETS Thousands/uL 120*   NEUTROS PCT % 61   LYMPHS PCT % 25   MONOS PCT % 9   EOS PCT % 4     Results from last 7 days   Lab Units 19  0454 19  0446   POTASSIUM mmol/L 3 7 3 7   CHLORIDE mmol/L 99* 101   CO2 mmol/L 21 21   BUN mg/dL 28* 53*   CREATININE mg/dL 1 20 1 46*   CALCIUM mg/dL 8 0* 8 1* ALK PHOS U/L  --  58   ALT U/L  --  57   AST U/L  --  29     Results from last 7 days   Lab Units 12/05/19  1036   INR  0 95       * I Have Reviewed All Lab Data Listed Above  * Additional Pertinent Lab Tests Reviewed: All Labs Within Last 24 Hours Reviewed      Recent Cultures (last 7 days):     Results from last 7 days   Lab Units 12/05/19  1950 12/05/19  1948   BLOOD CULTURE  No Growth at 24 hrs  No Growth at 24 hrs  Last 24 Hours Medication List:     Current Facility-Administered Medications:  acetaminophen 650 mg Oral Q6H PRN Juliette Stock MD   amLODIPine 10 mg Oral Daily Juliette Stock MD   aspirin 81 mg Oral Daily Juliette Stock MD   atenolol 50 mg Oral Daily Juliette Stock MD   fenofibrate 145 mg Oral Daily Juliette Stock MD   fluticasone 1 spray Nasal Daily Juliette Stock MD   heparin (porcine) 5,000 Units Subcutaneous [facility] Juliette Stock MD   insulin aspart protamine-insulin aspart 20 Units Subcutaneous BID AC Juliette Stock MD   insulin lispro 1-6 Units Subcutaneous HS Juliette Stock MD   insulin lispro 10 Units Subcutaneous TID With Meals Juliette Stock MD   insulin lispro 2-12 Units Subcutaneous TID Purvi Ferrer MD   nicotine 1 patch Transdermal Daily Juliette Stock MD   ondansetron 4 mg Intravenous Q6H PRN Juliette Stock MD   polyethylene glycol 17 g Oral Daily Juliette Stock MD   senna-docusate sodium 1 tablet Oral HS Juliette Stock MD   sodium chloride (PF) 3 mL Intravenous PRN Juliette Stock MD        Today, Patient Was Seen By: Juliette Stock MD    ** Please Note: Dragon 360 Dictation voice to text software may have been used in the creation of this document   **

## 2019-12-07 NOTE — PLAN OF CARE
Problem: Potential for Falls  Goal: Patient will remain free of falls  Description  INTERVENTIONS:  - Assess patient frequently for physical needs  -  Identify cognitive and physical deficits and behaviors that affect risk of falls    -  [place] fall precautions as indicated by assessment   - Educate patient/family on patient safety including physical limitations  - Instruct patient to call for assistance with activity based on assessment  - Modify environment to reduce risk of injury  - Consider OT/PT consult to assist with strengthening/mobility  Outcome: Progressing

## 2019-12-07 NOTE — NUTRITION
12/07/19 1207   Assessment   Timepoint Initial  (consult: new DM)   Labs   List Completed Labs   (A1c 15 5%, , Na 133 (L), BUN 28 (H), cholesterol 283 (H), TG 1737 (H), HDL 28 (L); meds- IVF @ 100ml/h, tenormin, lantus, humalog, miralax, senokot)   Feeding Route   PO Independent   Adequacy of Intake   Nutrition Modality PO  (CCD 2, low fat)   Intake Meals %   Estimated Calorie Intake %   Estimated Protein Intake  %   Estimated Fluid Intake %   Estimated calorie intake compared to estimated need pt reports that his appetite has returned to almost baseline, he ate 100% of his breakfast this AM   Nutrition Prognosis   Nutrition Concerns Blood sugar control  (per EMR pt admitted for new onset DM: BG continue to be elevated)   Comorbid Concerns   (HTN, dyslipidemia)   Nutrition Considerations   (provided extensive diet education to pt in regards to new onset DM with CHO counting, label reading and portion control; handouts and OP MNT program information provided as well)   PES Statement   Problem Behavioral-Environmental   Knowledge and Beliefs (1) Food- and nutrition-related knowledge deficit NB-1 1   Related to Other (comment)  (new onset diabetes)   As evidenced by: Abnormal lab (specify); Per patient interview  (A1c 15 5%)   Patient Nutrition Goals   Goal comprehend education;glucose in range   Goal Status initiated   Timeframe to complete goal by d/c   Recommendations/Interventions   Summary New onset DM, provided pt with extensive diet education on CHO counting, label reading, portion control for diabetes as well as heart healthy diet education for elevated lipid panel  Pt became tearful during education, stating he is overwhelmed with his new diagnosis and what it entails, and also concerned that he does not have insurance and does not know how he is going to get medical supplies  Spoke with case management who will revisit patient to discuss options      Malnutrition/BMI Present No Interventions Diet: continued as ordered;Education initiated/completed   Nutrition Recommendations Continue diet as ordered   Nutrition Complexity Risk   Nutrition complexity level Moderate risk   Follow up date 12/12/19

## 2019-12-07 NOTE — NURSING NOTE
Patient's BS for dinner was 188  DrSharon was notified of same, gave a verbal order of decreasing 10 units of insulin Humalog to 5 units

## 2019-12-08 VITALS
OXYGEN SATURATION: 96 % | HEART RATE: 71 BPM | HEIGHT: 67 IN | SYSTOLIC BLOOD PRESSURE: 118 MMHG | WEIGHT: 185.41 LBS | BODY MASS INDEX: 29.1 KG/M2 | TEMPERATURE: 98.1 F | RESPIRATION RATE: 18 BRPM | DIASTOLIC BLOOD PRESSURE: 64 MMHG

## 2019-12-08 LAB
ANION GAP SERPL CALCULATED.3IONS-SCNC: 11 MMOL/L (ref 4–13)
BUN SERPL-MCNC: 21 MG/DL (ref 5–25)
CALCIUM SERPL-MCNC: 8.4 MG/DL (ref 8.3–10.1)
CHLORIDE SERPL-SCNC: 95 MMOL/L (ref 100–108)
CO2 SERPL-SCNC: 22 MMOL/L (ref 21–32)
CREAT SERPL-MCNC: 1.34 MG/DL (ref 0.6–1.3)
GFR SERPL CREATININE-BSD FRML MDRD: 56 ML/MIN/1.73SQ M
GLUCOSE SERPL-MCNC: 241 MG/DL (ref 65–140)
GLUCOSE SERPL-MCNC: 244 MG/DL (ref 65–140)
GLUCOSE SERPL-MCNC: 306 MG/DL (ref 65–140)
GLUCOSE SERPL-MCNC: 309 MG/DL (ref 65–140)
GLUCOSE SERPL-MCNC: 397 MG/DL (ref 65–140)
POTASSIUM SERPL-SCNC: 4 MMOL/L (ref 3.5–5.3)
SODIUM SERPL-SCNC: 128 MMOL/L (ref 136–145)

## 2019-12-08 PROCEDURE — 82948 REAGENT STRIP/BLOOD GLUCOSE: CPT

## 2019-12-08 PROCEDURE — 80048 BASIC METABOLIC PNL TOTAL CA: CPT | Performed by: INTERNAL MEDICINE

## 2019-12-08 PROCEDURE — 90471 IMMUNIZATION ADMIN: CPT | Performed by: INTERNAL MEDICINE

## 2019-12-08 PROCEDURE — 99239 HOSP IP/OBS DSCHRG MGMT >30: CPT | Performed by: INTERNAL MEDICINE

## 2019-12-08 PROCEDURE — 90732 PPSV23 VACC 2 YRS+ SUBQ/IM: CPT | Performed by: INTERNAL MEDICINE

## 2019-12-08 RX ORDER — INSULIN ASPART 100 [IU]/ML
25 INJECTION, SUSPENSION SUBCUTANEOUS
Status: DISCONTINUED | OUTPATIENT
Start: 2019-12-08 | End: 2019-12-08 | Stop reason: HOSPADM

## 2019-12-08 RX ORDER — INSULIN ASPART 100 [IU]/ML
30 INJECTION, SUSPENSION SUBCUTANEOUS
Qty: 20 ML | Refills: 6 | Status: ON HOLD | OUTPATIENT
Start: 2019-12-08 | End: 2020-05-11

## 2019-12-08 RX ORDER — GLIMEPIRIDE 2 MG/1
2 TABLET ORAL
Qty: 30 TABLET | Refills: 2 | Status: SHIPPED | OUTPATIENT
Start: 2019-12-08 | End: 2020-09-20 | Stop reason: SDUPTHER

## 2019-12-08 RX ORDER — FENOFIBRATE 145 MG/1
145 TABLET, COATED ORAL DAILY
Qty: 30 TABLET | Refills: 2 | Status: SHIPPED | OUTPATIENT
Start: 2019-12-09 | End: 2020-01-02

## 2019-12-08 RX ORDER — GLIMEPIRIDE 2 MG/1
2 TABLET ORAL
Status: DISCONTINUED | OUTPATIENT
Start: 2019-12-08 | End: 2019-12-08 | Stop reason: HOSPADM

## 2019-12-08 RX ADMIN — HEPARIN SODIUM 5000 UNITS: 5000 INJECTION INTRAVENOUS; SUBCUTANEOUS at 06:10

## 2019-12-08 RX ADMIN — FENOFIBRATE 145 MG: 145 TABLET, COATED ORAL at 08:25

## 2019-12-08 RX ADMIN — AMLODIPINE BESYLATE 10 MG: 10 TABLET ORAL at 08:28

## 2019-12-08 RX ADMIN — POLYETHYLENE GLYCOL 3350 17 G: 17 POWDER, FOR SOLUTION ORAL at 08:32

## 2019-12-08 RX ADMIN — ASPIRIN 81 MG 81 MG: 81 TABLET ORAL at 08:25

## 2019-12-08 RX ADMIN — INSULIN LISPRO 4 UNITS: 100 INJECTION, SOLUTION INTRAVENOUS; SUBCUTANEOUS at 08:33

## 2019-12-08 RX ADMIN — GLIMEPIRIDE 2 MG: 2 TABLET ORAL at 13:05

## 2019-12-08 RX ADMIN — PNEUMOCOCCAL VACCINE POLYVALENT 0.5 ML
25; 25; 25; 25; 25; 25; 25; 25; 25; 25; 25; 25; 25; 25; 25; 25; 25; 25; 25; 25; 25; 25; 25 INJECTION, SOLUTION INTRAMUSCULAR; SUBCUTANEOUS at 13:05

## 2019-12-08 RX ADMIN — INSULIN LISPRO 8 UNITS: 100 INJECTION, SOLUTION INTRAVENOUS; SUBCUTANEOUS at 13:09

## 2019-12-08 RX ADMIN — ATENOLOL 50 MG: 50 TABLET ORAL at 08:28

## 2019-12-08 NOTE — DISCHARGE SUMMARY
Discharge Summary - [facility] Internal Medicine    Patient Information: Rehana Peñaloza 59 y o  male MRN: [id number]  Unit/Bed#: -01 Encounter: [id number]    Discharging Physician / Practitioner: Oumar Carroll MD  PCP: Karlie Wolff  Admission Date: 12/5/2019  Discharge Date: 12/08/19    Reason for Admission:  Hyperosmolar nonketotic state with type 2 diabetes mellitus    Discharge Diagnoses:     Principal Problem:    Hyperosmolar non-ketotic state in patient with type 2 diabetes mellitus ([facility] [address] )  Active Problems:    JOELLE (acute kidney injury) ([facility] [address] )    Essential hypertension    Hyperosmolar hyponatremia    Dyslipidemia    Abnormal transaminases    Leukocytosis  Resolved Problems:    * No resolved hospital problems  *    Present on Admission:   Hyperosmolar non-ketotic state in patient with type 2 diabetes mellitus ([facility] [address] )   JOELLE [facility]acute kidney injury) ([facility] )   Essential hypertension   Hyperosmolar hyponatremia   Dyslipidemia   Abnormal transaminases   Leukocytosis    Consultations During Hospital Stay:  · None    Procedures Performed:     · CT abdomen/pelvis/chest    Significant Findings:     · Collapsed gallbladder, prostatomegaly, enlarged fatty liver, colonic diverticulosis    Incidental Findings:   · As above     Test Results Pending at Discharge (will require follow up): · None     Outpatient Tests Requested:  · None    Complications:  None    Hospital Course:     Rehana Peñaloza is a 59 y o  male patient who originally presented to the hospital on 12/5/2019 due to nausea and vomiting in addition to not feeling well  Not been able to eat anything for the last days before coming to the hospital   He basically was being treated for URI with steroids and antibiotics  He could hardly keep anything down  Was severely dehydrated with acute kidney injury-hyperosmolar nonketotic state  He received IV fluids in addition to insulin    He likely has   underlying chronic kidney disease stage 3 although no previous creatinine available accept the had blood work done on September 24/19 and creatinine was 1 33  His creatinine appears to be very much at baseline -1 34 today  Sodium is still on the lower side 128 although it went up as high as 135  He is feeling much better  His triglycerides were 1737 with total cholesterol of 283  He is not really compliant with his diet  I thing most of these abnormalities are secondary to his uncontrolled diabetes mellitus  He would be on 70 30 insulin in addition to Amaryl  Would resume his other home medications on discharge  Advised him to follow with endocrinologist on outpatient basis and also primary care physician  Had lengthy discussion with patient about diet and exercise and about his sugars  He does not have any insurance  Have tried to give him some medications available at [facility] and on the cheaper side  His peak creatinine was 2 91  Condition at Discharge: good     Discharge Day Visit / Exam:     Subjective:  Feels good  No nausea or vomiting  No abdominal pain  Eager to be discharged home  No fever origins are yes  Vitals: Blood Pressure: 118/64 (12/08/19 0825)  Pulse: 71 (12/08/19 0710)  Temperature: 98 1 °F (36 7 °C) (12/08/19 0710)  Temp Source: Oral (12/07/19 2327)  Respirations: 18 (12/08/19 0710)  Height: 5' 7" (170 2 cm) (12/07/19 1205)  Weight - Scale: 84 1 kg (185 lb 6 5 oz) (12/08/19 0600)  SpO2: 96 % (12/08/19 0710)  Exam:        Vital signs are reviewed as above  In bed and not in any distress  S1 and S2 audible  Awake and alert  Oriented x3  Mood and affect are pleasant  You medically with nurses chest is clear to auscultation  Abdomen is obese  Nontender  Bowel sounds are audible              Discharge instructions/Information to patient and family:   See after visit summary for information provided to patient and family        Provisions for Follow-Up Care:  See after visit summary for information related to follow-up care and any pertinent home health orders  Disposition:     Home    For Discharges to [facility] SNF:   · Not Applicable to this Patient - Not Applicable to this Patient    Planned Readmission:  None     Discharge Statement:  I spent more than 30 minutes discharging the patient  This time was spent on the day of discharge  I had direct contact with the patient on the day of discharge  Greater than 50% of the total time was spent examining patient, answering all patient questions, arranging and discussing plan of care with patient as well as directly providing post-discharge instructions  Additional time then spent on discharge activities  Discharge Medications:  See after visit summary for reconciled discharge medications provided to patient and family  ** Please Note: Dragon 360 Dictation voice to text software may have been used in the creation of this document   **

## 2019-12-10 LAB
BACTERIA BLD CULT: NORMAL
BACTERIA BLD CULT: NORMAL

## 2019-12-20 ENCOUNTER — TRANSCRIBE ORDERS (OUTPATIENT)
Dept: NEPHROLOGY | Facility: CLINIC | Age: 64
End: 2019-12-20

## 2019-12-20 DIAGNOSIS — N18.9 CHRONIC KIDNEY DISEASE, UNSPECIFIED CKD STAGE: Primary | ICD-10-CM

## 2020-01-02 ENCOUNTER — CONSULT (OUTPATIENT)
Dept: NEPHROLOGY | Facility: CLINIC | Age: 65
End: 2020-01-02

## 2020-01-02 VITALS
DIASTOLIC BLOOD PRESSURE: 72 MMHG | HEIGHT: 67 IN | BODY MASS INDEX: 28.58 KG/M2 | SYSTOLIC BLOOD PRESSURE: 122 MMHG | HEART RATE: 83 BPM | WEIGHT: 182.13 LBS | RESPIRATION RATE: 16 BRPM

## 2020-01-02 DIAGNOSIS — N18.9 CHRONIC KIDNEY DISEASE, UNSPECIFIED CKD STAGE: ICD-10-CM

## 2020-01-02 DIAGNOSIS — E78.1 HYPERTRIGLYCERIDEMIA: Primary | ICD-10-CM

## 2020-01-02 PROCEDURE — 99245 OFF/OP CONSLTJ NEW/EST HI 55: CPT | Performed by: INTERNAL MEDICINE

## 2020-01-02 RX ORDER — ASPIRIN 325 MG
650 TABLET ORAL DAILY
COMMUNITY
End: 2020-05-11 | Stop reason: HOSPADM

## 2020-01-02 RX ORDER — FENOFIBRATE 48 MG/1
48 TABLET, COATED ORAL DAILY
Qty: 180 TABLET | Refills: 2 | Status: SHIPPED | OUTPATIENT
Start: 2020-01-02 | End: 2020-05-11 | Stop reason: HOSPADM

## 2020-01-02 RX ORDER — MULTIVIT-MIN/IRON FUM/FOLIC AC 7.5 MG-4
1 TABLET ORAL DAILY
COMMUNITY

## 2020-01-02 NOTE — PROGRESS NOTES
NEPHROLOGY OFFICE CONSULT  Sherice Lott 59 y o  male MRN: [id number]    Encounter: [id number] DATE: 1/2/2020    REASON FOR VISIT: Sherice Lott is a 59 y o male who was referred by Gretel Cope for evaluation  Edmundo Rivera HPI:    This is a 49-year-old male with past medical history of hypertension, diabetes mellitus type 2, fatty liver disease, dyslipidemia, chronic kidney disease stage 3 who presents to renal office for management of CKD  Patient was admitted in our facility on December 5, 2019 when he presented abdominal pain and was found to have elevated blood sugar and diagnosed with hyperosmolar nonketotic coma  At that time patient was noted to have presence of acute kidney injury with serum creatinine elevated up to 2 9  Patient admits to having uncontrolled blood sugar in the past   However believes that with the addition of Amaryl in taking insulin twice daily his sugars range between 70 and 150  Patient also has a long history of taking Advil for chronic pain  He denies taking any NSAIDs at present time  Upon discharge from our hospital on December 8, patient's serum creatinine had already improved to 1 3 which appears to be his baseline kidney function  He denies any headaches, chest pain, shortness of breath, abdominal pain, difficulty voiding or lower extremity edema  Patient does state that since being admitted with elevated blood sugar last month, his vision appears to have decreased          PAST MEDICAL HISTORY:  Past Medical History:   Diagnosis Date    Hyperlipidemia     Hypertension        PAST SURGICAL HISTORY:  Past Surgical History:   Procedure Laterality Date    COLONOSCOPY  2010    KNEE SURGERY Right        SOCIAL HISTORY:  Social History     Substance and Sexual Activity   Alcohol Use Yes    Comment: 1 mixed drinks/day     Social History     Substance and Sexual Activity   Drug Use Not Currently     Social History     Tobacco Use   Smoking Status Current Some Day Smoker    Packs/day: 0 25    Years: 40 00    Pack years: 10 00    Types: Cigarettes   Smokeless Tobacco Never Used       FAMILY HISTORY:  Family History   Problem Relation Age of Onset    Diabetes Mother     Hyperlipidemia Mother     Dementia Mother     Hyperlipidemia Father        ALLERGY:  No Known Allergies    MEDICATIONS:    Current Outpatient Medications:     amLODIPine (NORVASC) 10 mg tablet, Take 10 mg by mouth daily, Disp: , Rfl:     aspirin 325 mg tablet, Take 650 mg by mouth daily, Disp: , Rfl:     aspirin 81 mg chewable tablet, Chew 1 tablet (81 mg total) daily, Disp: 20 tablet, Rfl: 0    glimepiride (AMARYL) 2 mg tablet, Take 1 tablet (2 mg total) by mouth daily with breakfast, Disp: 30 tablet, Rfl: 2    hydrochlorothiazide (HYDRODIURIL) 25 mg tablet, Take 25 mg by mouth daily, Disp: , Rfl:     insulin aspart protamine-insulin aspart (NovoLOG 70/30) 100 units/mL injection, Inject 30 Units under the skin 2 (two) times a day before meals Can substitute with the cheapest available 70/30 insulin  Please also provide patient with insulin syringes # 100 with 6 refills  Please also provide with needles #100 with 6 refills, Disp: 20 mL, Rfl: 6    lisinopril (ZESTRIL) 40 mg tablet, Take 40 mg by mouth daily, Disp: , Rfl:     Multiple Vitamins-Minerals (MULTIVITAMIN WITH MINERALS) tablet, Take 1 tablet by mouth daily, Disp: , Rfl:     atenolol (TENORMIN) 100 mg tablet, Take 50 mg by mouth daily, Disp: , Rfl:     fenofibrate (TRICOR) 48 mg tablet, Take 1 tablet (48 mg total) by mouth daily, Disp: 180 tablet, Rfl: 2    fluticasone (FLONASE) 50 mcg/act nasal spray, 1 spray into each nostril daily (Patient not taking: Reported on 1/2/2020), Disp: 16 g, Rfl: 0    REVIEW OF SYSTEMS:    Review of Systems   Constitutional: Negative  HENT: Negative  Eyes: Positive for visual disturbance  Respiratory: Negative  Cardiovascular: Negative  Gastrointestinal: Negative  Endocrine: Negative      Genitourinary: Negative  Musculoskeletal: Negative  Skin: Negative  Allergic/Immunologic: Negative  Neurological: Negative  Hematological: Negative  All other systems reviewed and are negative  PHYSICAL EXAM:  Vitals:    01/02/20 1247   BP: 122/72   BP Location: Left arm   Patient Position: Sitting   Cuff Size: Large   Pulse: 83   Resp: 16   Weight: 82 6 kg (182 lb 2 oz)   Height: 5' 7" (1 702 m)     Body mass index is 28 52 kg/m²  Physical Exam   Constitutional: He is oriented to person, place, and time  He appears well-developed and well-nourished  HENT:   Head: Normocephalic and atraumatic  Eyes: Pupils are equal, round, and reactive to light  Neck: Neck supple  Cardiovascular: Normal rate, regular rhythm and normal heart sounds  Pulmonary/Chest: Effort normal    Abdominal: Soft  Bowel sounds are normal    Musculoskeletal: Normal range of motion  Neurological: He is alert and oriented to person, place, and time  Skin: Skin is warm  Psychiatric: He has a normal mood and affect  LAB RESULTS:  Results for orders placed or performed during the hospital encounter of 12/05/19   Blood culture   Result Value Ref Range    Blood Culture No Growth After 5 Days  Blood culture   Result Value Ref Range    Blood Culture No Growth After 5 Days      CBC and differential   Result Value Ref Range    WBC 13 74 (H) 4 31 - 10 16 Thousand/uL    RBC 4 93 3 88 - 5 62 Million/uL    Hemoglobin 14 9 12 0 - 17 0 g/dL    Hematocrit 46 3 36 5 - 49 3 %    MCV 94 82 - 98 fL    MCH 30 2 26 8 - 34 3 pg    MCHC 32 2 31 4 - 37 4 g/dL    RDW 12 5 11 6 - 15 1 %    MPV 13 8 (H) 8 9 - 12 7 fL    Platelets 410 604 - 308 Thousands/uL    nRBC 0 /100 WBCs    Neutrophils Relative 79 (H) 43 - 75 %    Immat GRANS % 1 0 - 2 %    Lymphocytes Relative 12 (L) 14 - 44 %    Monocytes Relative 7 4 - 12 %    Eosinophils Relative 1 0 - 6 %    Basophils Relative 0 0 - 1 %    Neutrophils Absolute 10 90 (H) 1 85 - 7 62 Thousands/µL Immature Grans Absolute 0 10 0 00 - 0 20 Thousand/uL    Lymphocytes Absolute 1 59 0 60 - 4 47 Thousands/µL    Monocytes Absolute 0 96 0 17 - 1 22 Thousand/µL    Eosinophils Absolute 0 14 0 00 - 0 61 Thousand/µL    Basophils Absolute 0 05 0 00 - 0 10 Thousands/µL   Troponin I   Result Value Ref Range    Troponin I <0 02 <=0 04 ng/mL   Comprehensive metabolic panel   Result Value Ref Range    Sodium 116 (L) 136 - 145 mmol/L    Potassium 5 9 (H) 3 5 - 5 3 mmol/L    Chloride 79 (L) 100 - 108 mmol/L    CO2 26 21 - 32 mmol/L    ANION GAP 11 4 - 13 mmol/L    BUN 98 (H) 5 - 25 mg/dL    Creatinine 2 91 (H) 0 60 - 1 30 mg/dL    Glucose 1,012 (HH) 65 - 140 mg/dL    Calcium 10 2 (H) 8 3 - 10 1 mg/dL    AST 25 5 - 45 U/L    ALT 75 12 - 78 U/L    Alkaline Phosphatase 118 (H) 46 - 116 U/L    Total Protein 8 0 6 4 - 8 2 g/dL    Albumin 3 8 3 5 - 5 0 g/dL    Total Bilirubin 0 60 0 20 - 1 00 mg/dL    eGFR 22 ml/min/1 73sq m   Magnesium   Result Value Ref Range    Magnesium 2 7 (H) 1 6 - 2 6 mg/dL   Phosphorus   Result Value Ref Range    Phosphorus 5 6 (H) 2 3 - 4 1 mg/dL   Beta Hydroxybutyrate   Result Value Ref Range    BETA-HYDROXYBUTYRATE 1 1 (H) <0 6 mmol/L   Blood gas, venous   Result Value Ref Range    pH, Doug 7 280 (L) 7 300 - 7 400    pCO2, Doug 52 2 (H) 42 0 - 50 0 mm Hg    pO2, Doug 24 9 (L) 35 0 - 45 0 mm Hg    HCO3, Doug 24 0 24 - 30 mmol/L    Base Excess, Doug -3 4 mmol/L    O2 Content, Doug 9 0 ml/dL    O2 HGB, VENOUS 45 4 (L) 60 0 - 80 0 %   Protime-INR   Result Value Ref Range    Protime 12 7 11 6 - 14 5 seconds    INR 0 95 0 84 - 1 19   APTT   Result Value Ref Range    PTT 29 23 - 37 seconds   UA w Reflex to Microscopic w Reflex to Culture   Result Value Ref Range    Color, UA Yellow     Clarity, UA Clear     Specific [place], UA 1 010 1 003 - 1 030    pH, UA 5 5 4 5, 5 0, 5 5, 6 0, 6 5, 7 0, 7 5, 8 0    Leukocytes, UA (A) Negative     Elevated glucose may cause decreased leukocyte values   See urine microscopic for [facility] result/    Nitrite, UA Negative Negative    Protein, UA Negative Negative mg/dl    Glucose, UA >=1000 (1%) (A) Negative mg/dl    Ketones, UA Negative Negative mg/dl    Urobilinogen, UA 0 2 0 2, 1 0 E U /dl E U /dl    Bilirubin, UA Negative Negative    Blood, UA Negative Negative   Urine Microscopic   Result Value Ref Range    RBC, UA None Seen None Seen, 0-5 /hpf    WBC, UA 0-1 (A) None Seen, 0-5, 5-55, 5-65 /hpf    Epithelial Cells Occasional None Seen, Occasional /hpf    Bacteria, UA Occasional None Seen, Occasional /hpf   TSH, 3rd generation   Result Value Ref Range    TSH 3RD GENERATON 1 868 0 358 - 3 740 uIU/mL   Platelet count   Result Value Ref Range    Platelets 437 (L) 021 - 390 Thousands/uL    MPV 13 8 (H) 8 9 - 12 7 fL   Troponin I   Result Value Ref Range    Troponin I <0 02 <=0 04 ng/mL   Basic metabolic panel   Result Value Ref Range    Sodium 136 136 - 145 mmol/L    Potassium 4 2 3 5 - 5 3 mmol/L    Chloride 99 (L) 100 - 108 mmol/L    CO2 22 21 - 32 mmol/L    ANION GAP 15 (H) 4 - 13 mmol/L    BUN 81 (H) 5 - 25 mg/dL    Creatinine 2 11 (H) 0 60 - 1 30 mg/dL    Glucose 378 (H) 65 - 140 mg/dL    Calcium 9 6 8 3 - 10 1 mg/dL    eGFR 32 ml/min/1 73sq m   Troponin I   Result Value Ref Range    Troponin I <0 02 <=0 04 ng/mL   Basic metabolic panel   Result Value Ref Range    Sodium 131 (L) 136 - 145 mmol/L    Potassium 3 7 3 5 - 5 3 mmol/L    Chloride 99 (L) 100 - 108 mmol/L    CO2 23 21 - 32 mmol/L    ANION GAP 9 4 - 13 mmol/L    BUN 66 (H) 5 - 25 mg/dL    Creatinine 1 73 (H) 0 60 - 1 30 mg/dL    Glucose 331 (H) 65 - 140 mg/dL    Calcium 8 4 8 3 - 10 1 mg/dL    eGFR 41 ml/min/1 73sq m   CBC   Result Value Ref Range    WBC 11 96 (H) 4 31 - 10 16 Thousand/uL    RBC 4 05 3 88 - 5 62 Million/uL    Hemoglobin 12 3 12 0 - 17 0 g/dL    Hematocrit 37 9 36 5 - 49 3 %    MCV 94 82 - 98 fL    MCH 30 4 26 8 - 34 3 pg    MCHC 32 5 31 4 - 37 4 g/dL    RDW 12 4 11 6 - 15 1 %    Platelets 929 (L) 108 - 390 Thousands/uL MPV 14 0 (H) 8 9 - 12 7 fL   Comprehensive metabolic panel   Result Value Ref Range    Sodium 135 (L) 136 - 145 mmol/L    Potassium 3 7 3 5 - 5 3 mmol/L    Chloride 101 100 - 108 mmol/L    CO2 21 21 - 32 mmol/L    ANION GAP 13 4 - 13 mmol/L    BUN 53 (H) 5 - 25 mg/dL    Creatinine 1 46 (H) 0 60 - 1 30 mg/dL    Glucose 225 (H) 65 - 140 mg/dL    Calcium 8 1 (L) 8 3 - 10 1 mg/dL    AST 29 5 - 45 U/L    ALT 57 12 - 78 U/L    Alkaline Phosphatase 58 46 - 116 U/L    Total Protein 5 9 (L) 6 4 - 8 2 g/dL    Albumin 2 8 (L) 3 5 - 5 0 g/dL    Total Bilirubin 0 40 0 20 - 1 00 mg/dL    eGFR 50 ml/min/1 73sq m   Lipid panel   Result Value Ref Range    Cholesterol 283 (H) 50 - 200 mg/dL    Triglycerides 1,737 (H) <=150 mg/dL    HDL, Direct 28 (L) >=40 mg/dL    LDL Calculated      Non-HDL-Chol (CHOL-HDL) 255 mg/dl   Lipase   Result Value Ref Range    Lipase 339 73 - 393 u/L   Hemoglobin A1c w/EAG Estimation (Orders if not completed within the last 90 days)   Result Value Ref Range    Hemoglobin A1C 15 5 (H) 4 2 - 6 3 %     mg/dl   Basic metabolic panel   Result Value Ref Range    Sodium 133 (L) 136 - 145 mmol/L    Potassium 3 7 3 5 - 5 3 mmol/L    Chloride 99 (L) 100 - 108 mmol/L    CO2 21 21 - 32 mmol/L    ANION GAP 13 4 - 13 mmol/L    BUN 28 (H) 5 - 25 mg/dL    Creatinine 1 20 0 60 - 1 30 mg/dL    Glucose 260 (H) 65 - 140 mg/dL    Calcium 8 0 (L) 8 3 - 10 1 mg/dL    eGFR 64 ml/min/1 73sq m   CBC and differential   Result Value Ref Range    WBC 9 29 4 31 - 10 16 Thousand/uL    RBC 3 99 3 88 - 5 62 Million/uL    Hemoglobin 12 3 12 0 - 17 0 g/dL    Hematocrit 37 8 36 5 - 49 3 %    MCV 95 82 - 98 fL    MCH 30 8 26 8 - 34 3 pg    MCHC 32 5 31 4 - 37 4 g/dL    RDW 12 5 11 6 - 15 1 %    MPV 13 5 (H) 8 9 - 12 7 fL    Platelets 874 (L) 939 - 390 Thousands/uL    nRBC 0 /100 WBCs    Neutrophils Relative 61 43 - 75 %    Immat GRANS % 1 0 - 2 %    Lymphocytes Relative 25 14 - 44 %    Monocytes Relative 9 4 - 12 %    Eosinophils Relative 4 0 - 6 %    Basophils Relative 0 0 - 1 %    Neutrophils Absolute 5 63 1 85 - 7 62 Thousands/µL    Immature Grans Absolute 0 08 0 00 - 0 20 Thousand/uL    Lymphocytes Absolute 2 29 0 60 - 4 47 Thousands/µL    Monocytes Absolute 0 87 0 17 - 1 22 Thousand/µL    Eosinophils Absolute 0 39 0 00 - 0 61 Thousand/µL    Basophils Absolute 0 03 0 00 - 0 10 Thousands/µL   Basic metabolic panel   Result Value Ref Range    Sodium 128 (L) 136 - 145 mmol/L    Potassium 4 0 3 5 - 5 3 mmol/L    Chloride 95 (L) 100 - 108 mmol/L    CO2 22 21 - 32 mmol/L    ANION GAP 11 4 - 13 mmol/L    BUN 21 5 - 25 mg/dL    Creatinine 1 34 (H) 0 60 - 1 30 mg/dL    Glucose 397 (H) 65 - 140 mg/dL    Calcium 8 4 8 3 - 10 1 mg/dL    eGFR 56 ml/min/1 73sq m   ECG 12 lead   Result Value Ref Range    Ventricular Rate 60 BPM    Atrial Rate 60 BPM    WA Interval 176 ms    QRSD Interval 124 ms    QT Interval 442 ms    QTC Interval 442 ms    P Axis 70 degrees    QRS Axis -19 degrees    T Wave Axis 106 degrees   Fingerstick Glucose (POCT)   Result Value Ref Range    POC Glucose >500 (HH) 65 - 140 mg/dl   Fingerstick Glucose (POCT)   Result Value Ref Range    POC Glucose >500 (HH) 65 - 140 mg/dl   Fingerstick Glucose (POCT)   Result Value Ref Range    POC Glucose >500 (HH) 65 - 140 mg/dl   Fingerstick Glucose (POCT)   Result Value Ref Range    POC Glucose 439 (H) 65 - 140 mg/dl   Fingerstick Glucose (POCT)   Result Value Ref Range    POC Glucose 352 (H) 65 - 140 mg/dl   Fingerstick Glucose (POCT)   Result Value Ref Range    POC Glucose 495 (H) 65 - 140 mg/dl   Fingerstick Glucose (POCT)   Result Value Ref Range    POC Glucose 464 (H) 65 - 140 mg/dl   Fingerstick Glucose (POCT)   Result Value Ref Range    POC Glucose 431 (H) 65 - 140 mg/dl   Fingerstick Glucose (POCT)   Result Value Ref Range    POC Glucose 329 (H) 65 - 140 mg/dl   Fingerstick Glucose (POCT)   Result Value Ref Range    POC Glucose 269 (H) 65 - 140 mg/dl   Fingerstick Glucose (POCT) Result Value Ref Range    POC Glucose 275 (H) 65 - 140 mg/dl   Fingerstick Glucose (POCT)   Result Value Ref Range    POC Glucose 234 (H) 65 - 140 mg/dl   Fingerstick Glucose (POCT)   Result Value Ref Range    POC Glucose 214 (H) 65 - 140 mg/dl   Fingerstick Glucose (POCT)   Result Value Ref Range    POC Glucose 215 (H) 65 - 140 mg/dl   Fingerstick Glucose (POCT)   Result Value Ref Range    POC Glucose 209 (H) 65 - 140 mg/dl   Fingerstick Glucose (POCT)   Result Value Ref Range    POC Glucose 247 (H) 65 - 140 mg/dl   Fingerstick Glucose (POCT)   Result Value Ref Range    POC Glucose 383 (H) 65 - 140 mg/dl   Fingerstick Glucose (POCT)   Result Value Ref Range    POC Glucose 290 (H) 65 - 140 mg/dl   Fingerstick Glucose (POCT)   Result Value Ref Range    POC Glucose 268 (H) 65 - 140 mg/dl   Fingerstick Glucose (POCT)   Result Value Ref Range    POC Glucose 174 (H) 65 - 140 mg/dl   Fingerstick Glucose (POCT)   Result Value Ref Range    POC Glucose 183 (H) 65 - 140 mg/dl   Fingerstick Glucose (POCT)   Result Value Ref Range    POC Glucose 207 (H) 65 - 140 mg/dl   Fingerstick Glucose (POCT)   Result Value Ref Range    POC Glucose 232 (H) 65 - 140 mg/dl   Fingerstick Glucose (POCT)   Result Value Ref Range    POC Glucose 350 (H) 65 - 140 mg/dl   Fingerstick Glucose (POCT)   Result Value Ref Range    POC Glucose 188 (H) 65 - 140 mg/dl   Fingerstick Glucose (POCT)   Result Value Ref Range    POC Glucose 305 (H) 65 - 140 mg/dl   Fingerstick Glucose (POCT)   Result Value Ref Range    POC Glucose 244 (H) 65 - 140 mg/dl   Fingerstick Glucose (POCT)   Result Value Ref Range    POC Glucose 241 (H) 65 - 140 mg/dl   Fingerstick Glucose (POCT)   Result Value Ref Range    POC Glucose 309 (H) 65 - 140 mg/dl   Fingerstick Glucose (POCT)   Result Value Ref Range    POC Glucose 306 (H) 65 - 140 mg/dl         ASSESSMENT and PLAN:  Diagnoses and all orders for this visit:    Hypertriglyceridemia  -     fenofibrate (TRICOR) 48 mg tablet; Take 1 tablet (48 mg total) by mouth daily    Chronic kidney disease, unspecified CKD stage  -     Ambulatory referral to Nephrology  -     Albumin; Standing  -     Calcium; Standing  -     CBC and differential; Standing  -     Comprehensive metabolic panel; Standing  -     Phosphorus; Standing  -     Protein / creatinine ratio, urine; Standing  -     PTH, intact; Standing  -     Urinalysis with microscopic; Standing  -     Vitamin D 25 hydroxy; Standing  -     US kidney and bladder; Future  -     Albumin  -     Calcium  -     CBC and differential  -     Comprehensive metabolic panel  -     Phosphorus  -     Protein / creatinine ratio, urine  -     PTH, intact  -     Urinalysis with microscopic  -     Vitamin D 25 hydroxy      80-year-old male with past medical history of hypertension, diabetes mellitus type 2, dyslipidemia, fatty liver, chronic kidney disease stage 3 who presents to the renal office for management of CKD  1  Chronic kidney disease stage 3:  Etiology of CKD likely appears to be diabetic glomerulosclerosis, hypertensive nephrosclerosis, age and dyslipidemia  It appears from patient's prior records that his serum creatinine runs at a baseline of 1 3 mg/dL  Patient underwent acute kidney injury episode in last month with serum creatinine raymond to 2 9 and elevated  This improved after administration of fluids and improving blood sugar resulting in serum creatinine getting back to its baseline value of 1 3 mg/dL  Avoidance of NSAIDs reiterated  Ingestion of at least 2 5 L of water daily recommended  A renal ultrasound will be scheduled to evaluate patient's renal parenchyma  2  Hypertension:  Patient blood pressure is in excellent range in the office at 203 Systolic  He is maintained on atenolol, lisinopril, amlodipine and hydrochlorothiazide  3  Proteinuria: patient's prior urinalysis had not reveal any signs of proteinuria    We will evaluate patient urinalysis and perform a urine protein creatinine ratio to monitor signs of proteinuria in this patient  Patient is already noted to be on ACE-inhibitor in the form of lisinopril 40 mg once daily  4  Diabetes mellitus type 2:  Patient's blood sugar appear to be in better range while on Amaryl and insulin  5  Hypertriglyceridemia :  Noted patient to be taking a higher dose of TriCor  Will order a lower dose of TriCor up to 48 mg instead  6  Fatty liver disease:  Noted on CT scan  Recommended patient to lose weight and improve glycemic control  7  Nutrition:  Low-potassium, low phosphorus, low sodium and moderate protein diet recommended        7

## 2020-05-08 ENCOUNTER — APPOINTMENT (EMERGENCY)
Dept: RADIOLOGY | Facility: HOSPITAL | Age: 65
End: 2020-05-08
Payer: COMMERCIAL

## 2020-05-08 ENCOUNTER — HOSPITAL ENCOUNTER (OUTPATIENT)
Facility: HOSPITAL | Age: 65
Setting detail: OBSERVATION
Discharge: HOME | End: 2020-05-11
Attending: EMERGENCY MEDICINE | Admitting: INTERNAL MEDICINE
Payer: COMMERCIAL

## 2020-05-08 DIAGNOSIS — E78.5 DYSLIPIDEMIA: ICD-10-CM

## 2020-05-08 DIAGNOSIS — I10 ESSENTIAL HYPERTENSION: ICD-10-CM

## 2020-05-08 DIAGNOSIS — E11.9 DIABETES MELLITUS TYPE 2 IN NONOBESE (HCC): ICD-10-CM

## 2020-05-08 DIAGNOSIS — R07.9 CHEST PAIN: Primary | ICD-10-CM

## 2020-05-08 DIAGNOSIS — I25.10 CORONARY ARTERY DISEASE INVOLVING NATIVE CORONARY ARTERY OF NATIVE HEART WITHOUT ANGINA PECTORIS: ICD-10-CM

## 2020-05-08 DIAGNOSIS — E11.00 HYPEROSMOLAR NON-KETOTIC STATE IN PATIENT WITH TYPE 2 DIABETES MELLITUS (HCC): ICD-10-CM

## 2020-05-08 LAB
ALBUMIN SERPL BCP-MCNC: 3.8 G/DL (ref 3.5–5)
ALP SERPL-CCNC: 58 U/L (ref 46–116)
ALT SERPL W P-5'-P-CCNC: 20 U/L (ref 12–78)
ANION GAP SERPL CALCULATED.3IONS-SCNC: 12 MMOL/L (ref 4–13)
APTT PPP: 36 SECONDS (ref 23–37)
AST SERPL W P-5'-P-CCNC: 19 U/L (ref 5–45)
BASOPHILS # BLD AUTO: 0.06 THOUSANDS/ΜL (ref 0–0.1)
BASOPHILS NFR BLD AUTO: 0 % (ref 0–1)
BILIRUB DIRECT SERPL-MCNC: 0.13 MG/DL (ref 0–0.2)
BILIRUB SERPL-MCNC: 0.3 MG/DL (ref 0.2–1)
BUN SERPL-MCNC: 13 MG/DL (ref 5–25)
CALCIUM SERPL-MCNC: 9.3 MG/DL (ref 8.3–10.1)
CHLORIDE SERPL-SCNC: 105 MMOL/L (ref 100–108)
CO2 SERPL-SCNC: 25 MMOL/L (ref 21–32)
CREAT SERPL-MCNC: 1.03 MG/DL (ref 0.6–1.3)
EOSINOPHIL # BLD AUTO: 0.17 THOUSAND/ΜL (ref 0–0.61)
EOSINOPHIL NFR BLD AUTO: 1 % (ref 0–6)
ERYTHROCYTE [DISTWIDTH] IN BLOOD BY AUTOMATED COUNT: 14.2 % (ref 11.6–15.1)
EST. AVERAGE GLUCOSE BLD GHB EST-MCNC: 105 MG/DL
GFR SERPL CREATININE-BSD FRML MDRD: 76 ML/MIN/1.73SQ M
GLUCOSE SERPL-MCNC: 104 MG/DL (ref 65–140)
GLUCOSE SERPL-MCNC: 120 MG/DL (ref 65–140)
GLUCOSE SERPL-MCNC: 50 MG/DL (ref 65–140)
GLUCOSE SERPL-MCNC: 59 MG/DL (ref 65–140)
GLUCOSE SERPL-MCNC: 82 MG/DL (ref 65–140)
HBA1C MFR BLD: 5.3 %
HCT VFR BLD AUTO: 41.4 % (ref 36.5–49.3)
HGB BLD-MCNC: 13.4 G/DL (ref 12–17)
IMM GRANULOCYTES # BLD AUTO: 0.06 THOUSAND/UL (ref 0–0.2)
IMM GRANULOCYTES NFR BLD AUTO: 0 % (ref 0–2)
INR PPP: 1.06 (ref 0.84–1.19)
LYMPHOCYTES # BLD AUTO: 1.34 THOUSANDS/ΜL (ref 0.6–4.47)
LYMPHOCYTES NFR BLD AUTO: 10 % (ref 14–44)
MAGNESIUM SERPL-MCNC: 1.8 MG/DL (ref 1.6–2.6)
MCH RBC QN AUTO: 28.8 PG (ref 26.8–34.3)
MCHC RBC AUTO-ENTMCNC: 32.4 G/DL (ref 31.4–37.4)
MCV RBC AUTO: 89 FL (ref 82–98)
MONOCYTES # BLD AUTO: 0.86 THOUSAND/ΜL (ref 0.17–1.22)
MONOCYTES NFR BLD AUTO: 6 % (ref 4–12)
NEUTROPHILS # BLD AUTO: 11.48 THOUSANDS/ΜL (ref 1.85–7.62)
NEUTS SEG NFR BLD AUTO: 83 % (ref 43–75)
NRBC BLD AUTO-RTO: 0 /100 WBCS
NT-PROBNP SERPL-MCNC: 86 PG/ML
PLATELET # BLD AUTO: 188 THOUSANDS/UL (ref 149–390)
PMV BLD AUTO: 12.3 FL (ref 8.9–12.7)
POTASSIUM SERPL-SCNC: 3.6 MMOL/L (ref 3.5–5.3)
PROT SERPL-MCNC: 6.8 G/DL (ref 6.4–8.2)
PROTHROMBIN TIME: 13.9 SECONDS (ref 11.6–14.5)
RBC # BLD AUTO: 4.65 MILLION/UL (ref 3.88–5.62)
SARS-COV-2 RNA RESP QL NAA+PROBE: NEGATIVE
SODIUM SERPL-SCNC: 142 MMOL/L (ref 136–145)
TROPONIN I SERPL-MCNC: 0.02 NG/ML
TROPONIN I SERPL-MCNC: 0.02 NG/ML
TROPONIN I SERPL-MCNC: 0.03 NG/ML
TROPONIN I SERPL-MCNC: <0.02 NG/ML
WBC # BLD AUTO: 13.97 THOUSAND/UL (ref 4.31–10.16)

## 2020-05-08 PROCEDURE — 85025 COMPLETE CBC W/AUTO DIFF WBC: CPT | Performed by: EMERGENCY MEDICINE

## 2020-05-08 PROCEDURE — 99285 EMERGENCY DEPT VISIT HI MDM: CPT | Performed by: EMERGENCY MEDICINE

## 2020-05-08 PROCEDURE — 99285 EMERGENCY DEPT VISIT HI MDM: CPT

## 2020-05-08 PROCEDURE — 83735 ASSAY OF MAGNESIUM: CPT | Performed by: EMERGENCY MEDICINE

## 2020-05-08 PROCEDURE — 83880 ASSAY OF NATRIURETIC PEPTIDE: CPT | Performed by: EMERGENCY MEDICINE

## 2020-05-08 PROCEDURE — 99220 PR INITIAL OBSERVATION CARE/DAY 70 MINUTES: CPT | Performed by: INTERNAL MEDICINE

## 2020-05-08 PROCEDURE — 87635 SARS-COV-2 COVID-19 AMP PRB: CPT | Performed by: EMERGENCY MEDICINE

## 2020-05-08 PROCEDURE — 85730 THROMBOPLASTIN TIME PARTIAL: CPT | Performed by: EMERGENCY MEDICINE

## 2020-05-08 PROCEDURE — 80048 BASIC METABOLIC PNL TOTAL CA: CPT | Performed by: EMERGENCY MEDICINE

## 2020-05-08 PROCEDURE — 93005 ELECTROCARDIOGRAM TRACING: CPT

## 2020-05-08 PROCEDURE — 84484 ASSAY OF TROPONIN QUANT: CPT | Performed by: INTERNAL MEDICINE

## 2020-05-08 PROCEDURE — 71045 X-RAY EXAM CHEST 1 VIEW: CPT

## 2020-05-08 PROCEDURE — 36415 COLL VENOUS BLD VENIPUNCTURE: CPT | Performed by: EMERGENCY MEDICINE

## 2020-05-08 PROCEDURE — 83036 HEMOGLOBIN GLYCOSYLATED A1C: CPT | Performed by: INTERNAL MEDICINE

## 2020-05-08 PROCEDURE — 80076 HEPATIC FUNCTION PANEL: CPT | Performed by: EMERGENCY MEDICINE

## 2020-05-08 PROCEDURE — 84484 ASSAY OF TROPONIN QUANT: CPT | Performed by: EMERGENCY MEDICINE

## 2020-05-08 PROCEDURE — 85610 PROTHROMBIN TIME: CPT | Performed by: EMERGENCY MEDICINE

## 2020-05-08 PROCEDURE — 82948 REAGENT STRIP/BLOOD GLUCOSE: CPT

## 2020-05-08 RX ORDER — ASPIRIN 81 MG/1
81 TABLET, CHEWABLE ORAL DAILY
Status: DISCONTINUED | OUTPATIENT
Start: 2020-05-08 | End: 2020-05-08

## 2020-05-08 RX ORDER — INSULIN ASPART 100 [IU]/ML
30 INJECTION, SUSPENSION SUBCUTANEOUS
Status: DISCONTINUED | OUTPATIENT
Start: 2020-05-08 | End: 2020-05-09

## 2020-05-08 RX ORDER — ASPIRIN 325 MG
650 TABLET ORAL DAILY
Status: DISCONTINUED | OUTPATIENT
Start: 2020-05-08 | End: 2020-05-08

## 2020-05-08 RX ORDER — ONDANSETRON 2 MG/ML
4 INJECTION INTRAMUSCULAR; INTRAVENOUS EVERY 6 HOURS PRN
Status: DISCONTINUED | OUTPATIENT
Start: 2020-05-08 | End: 2020-05-11 | Stop reason: HOSPADM

## 2020-05-08 RX ORDER — GLIMEPIRIDE 2 MG/1
2 TABLET ORAL
Status: CANCELLED | OUTPATIENT
Start: 2020-05-09

## 2020-05-08 RX ORDER — ONDANSETRON 2 MG/ML
4 INJECTION INTRAMUSCULAR; INTRAVENOUS ONCE
Status: DISCONTINUED | OUTPATIENT
Start: 2020-05-08 | End: 2020-05-11 | Stop reason: HOSPADM

## 2020-05-08 RX ORDER — AMLODIPINE BESYLATE 10 MG/1
10 TABLET ORAL DAILY
Status: DISCONTINUED | OUTPATIENT
Start: 2020-05-08 | End: 2020-05-08

## 2020-05-08 RX ORDER — LISINOPRIL 20 MG/1
40 TABLET ORAL DAILY
Status: DISCONTINUED | OUTPATIENT
Start: 2020-05-08 | End: 2020-05-08

## 2020-05-08 RX ORDER — FENOFIBRATE 48 MG/1
48 TABLET, COATED ORAL DAILY
Status: DISCONTINUED | OUTPATIENT
Start: 2020-05-08 | End: 2020-05-09

## 2020-05-08 RX ORDER — HYDROCHLOROTHIAZIDE 25 MG/1
25 TABLET ORAL DAILY
Status: DISCONTINUED | OUTPATIENT
Start: 2020-05-08 | End: 2020-05-08

## 2020-05-08 RX ORDER — NICOTINE 21 MG/24HR
1 PATCH, TRANSDERMAL 24 HOURS TRANSDERMAL DAILY
Status: DISCONTINUED | OUTPATIENT
Start: 2020-05-08 | End: 2020-05-11 | Stop reason: HOSPADM

## 2020-05-08 RX ORDER — ACETAMINOPHEN 325 MG/1
650 TABLET ORAL EVERY 4 HOURS PRN
Status: DISCONTINUED | OUTPATIENT
Start: 2020-05-08 | End: 2020-05-11 | Stop reason: HOSPADM

## 2020-05-08 RX ORDER — ATENOLOL 50 MG/1
50 TABLET ORAL DAILY
Status: DISCONTINUED | OUTPATIENT
Start: 2020-05-08 | End: 2020-05-08

## 2020-05-08 RX ORDER — ASPIRIN 81 MG/1
81 TABLET, CHEWABLE ORAL
Status: DISCONTINUED | OUTPATIENT
Start: 2020-05-08 | End: 2020-05-11 | Stop reason: HOSPADM

## 2020-05-08 RX ORDER — LISINOPRIL 20 MG/1
40 TABLET ORAL
Status: DISCONTINUED | OUTPATIENT
Start: 2020-05-08 | End: 2020-05-11 | Stop reason: HOSPADM

## 2020-05-08 RX ORDER — HEPARIN SODIUM 5000 [USP'U]/ML
5000 INJECTION, SOLUTION INTRAVENOUS; SUBCUTANEOUS EVERY 8 HOURS SCHEDULED
Status: COMPLETED | OUTPATIENT
Start: 2020-05-08 | End: 2020-05-10

## 2020-05-08 RX ADMIN — HEPARIN SODIUM 5000 UNITS: 5000 INJECTION INTRAVENOUS; SUBCUTANEOUS at 22:46

## 2020-05-08 RX ADMIN — LISINOPRIL 40 MG: 20 TABLET ORAL at 22:46

## 2020-05-08 RX ADMIN — ASPIRIN 81 MG 81 MG: 81 TABLET ORAL at 22:46

## 2020-05-08 RX ADMIN — HEPARIN SODIUM 5000 UNITS: 5000 INJECTION INTRAVENOUS; SUBCUTANEOUS at 15:14

## 2020-05-09 LAB
ANION GAP SERPL CALCULATED.3IONS-SCNC: 9 MMOL/L (ref 4–13)
ATRIAL RATE: 56 BPM
ATRIAL RATE: 59 BPM
ATRIAL RATE: 74 BPM
BASOPHILS # BLD AUTO: 0.07 THOUSANDS/ΜL (ref 0–0.1)
BASOPHILS NFR BLD AUTO: 1 % (ref 0–1)
BUN SERPL-MCNC: 13 MG/DL (ref 5–25)
CALCIUM SERPL-MCNC: 9 MG/DL (ref 8.3–10.1)
CHLORIDE SERPL-SCNC: 107 MMOL/L (ref 100–108)
CHOLEST SERPL-MCNC: 102 MG/DL (ref 50–200)
CO2 SERPL-SCNC: 26 MMOL/L (ref 21–32)
CREAT SERPL-MCNC: 0.97 MG/DL (ref 0.6–1.3)
EOSINOPHIL # BLD AUTO: 0.68 THOUSAND/ΜL (ref 0–0.61)
EOSINOPHIL NFR BLD AUTO: 7 % (ref 0–6)
ERYTHROCYTE [DISTWIDTH] IN BLOOD BY AUTOMATED COUNT: 14.4 % (ref 11.6–15.1)
GFR SERPL CREATININE-BSD FRML MDRD: 82 ML/MIN/1.73SQ M
GLUCOSE SERPL-MCNC: 105 MG/DL (ref 65–140)
GLUCOSE SERPL-MCNC: 121 MG/DL (ref 65–140)
GLUCOSE SERPL-MCNC: 73 MG/DL (ref 65–140)
GLUCOSE SERPL-MCNC: 74 MG/DL (ref 65–140)
GLUCOSE SERPL-MCNC: 98 MG/DL (ref 65–140)
HCT VFR BLD AUTO: 41 % (ref 36.5–49.3)
HDLC SERPL-MCNC: 45 MG/DL
HGB BLD-MCNC: 13.1 G/DL (ref 12–17)
IMM GRANULOCYTES # BLD AUTO: 0.05 THOUSAND/UL (ref 0–0.2)
IMM GRANULOCYTES NFR BLD AUTO: 1 % (ref 0–2)
LDLC SERPL CALC-MCNC: 43 MG/DL (ref 0–100)
LYMPHOCYTES # BLD AUTO: 2.71 THOUSANDS/ΜL (ref 0.6–4.47)
LYMPHOCYTES NFR BLD AUTO: 28 % (ref 14–44)
MCH RBC QN AUTO: 28.7 PG (ref 26.8–34.3)
MCHC RBC AUTO-ENTMCNC: 32 G/DL (ref 31.4–37.4)
MCV RBC AUTO: 90 FL (ref 82–98)
MONOCYTES # BLD AUTO: 0.9 THOUSAND/ΜL (ref 0.17–1.22)
MONOCYTES NFR BLD AUTO: 9 % (ref 4–12)
NEUTROPHILS # BLD AUTO: 5.22 THOUSANDS/ΜL (ref 1.85–7.62)
NEUTS SEG NFR BLD AUTO: 54 % (ref 43–75)
NRBC BLD AUTO-RTO: 0 /100 WBCS
P AXIS: 66 DEGREES
P AXIS: 70 DEGREES
P AXIS: 74 DEGREES
PLATELET # BLD AUTO: 189 THOUSANDS/UL (ref 149–390)
PMV BLD AUTO: 12.2 FL (ref 8.9–12.7)
POTASSIUM SERPL-SCNC: 3.6 MMOL/L (ref 3.5–5.3)
PR INTERVAL: 164 MS
PR INTERVAL: 168 MS
PR INTERVAL: 178 MS
QRS AXIS: -33 DEGREES
QRS AXIS: -43 DEGREES
QRS AXIS: -51 DEGREES
QRSD INTERVAL: 140 MS
QRSD INTERVAL: 146 MS
QRSD INTERVAL: 158 MS
QT INTERVAL: 456 MS
QT INTERVAL: 496 MS
QT INTERVAL: 498 MS
QTC INTERVAL: 478 MS
QTC INTERVAL: 493 MS
QTC INTERVAL: 506 MS
RBC # BLD AUTO: 4.56 MILLION/UL (ref 3.88–5.62)
SODIUM SERPL-SCNC: 142 MMOL/L (ref 136–145)
T WAVE AXIS: 108 DEGREES
T WAVE AXIS: 116 DEGREES
T WAVE AXIS: 95 DEGREES
TRIGL SERPL-MCNC: 72 MG/DL
VENTRICULAR RATE: 56 BPM
VENTRICULAR RATE: 59 BPM
VENTRICULAR RATE: 74 BPM
WBC # BLD AUTO: 9.63 THOUSAND/UL (ref 4.31–10.16)

## 2020-05-09 PROCEDURE — 85025 COMPLETE CBC W/AUTO DIFF WBC: CPT | Performed by: INTERNAL MEDICINE

## 2020-05-09 PROCEDURE — 80048 BASIC METABOLIC PNL TOTAL CA: CPT | Performed by: INTERNAL MEDICINE

## 2020-05-09 PROCEDURE — 99226 PR SBSQ OBSERVATION CARE/DAY 35 MINUTES: CPT | Performed by: INTERNAL MEDICINE

## 2020-05-09 PROCEDURE — 82948 REAGENT STRIP/BLOOD GLUCOSE: CPT

## 2020-05-09 PROCEDURE — 99245 OFF/OP CONSLTJ NEW/EST HI 55: CPT | Performed by: INTERNAL MEDICINE

## 2020-05-09 PROCEDURE — 80061 LIPID PANEL: CPT | Performed by: INTERNAL MEDICINE

## 2020-05-09 PROCEDURE — 93010 ELECTROCARDIOGRAM REPORT: CPT | Performed by: INTERNAL MEDICINE

## 2020-05-09 RX ORDER — ATENOLOL 25 MG/1
25 TABLET ORAL DAILY
Status: DISCONTINUED | OUTPATIENT
Start: 2020-05-09 | End: 2020-05-10

## 2020-05-09 RX ORDER — ATORVASTATIN CALCIUM 40 MG/1
40 TABLET, FILM COATED ORAL
Status: DISCONTINUED | OUTPATIENT
Start: 2020-05-09 | End: 2020-05-11 | Stop reason: HOSPADM

## 2020-05-09 RX ORDER — INSULIN ASPART 100 [IU]/ML
10 INJECTION, SUSPENSION SUBCUTANEOUS
Status: DISCONTINUED | OUTPATIENT
Start: 2020-05-09 | End: 2020-05-11 | Stop reason: HOSPADM

## 2020-05-09 RX ADMIN — ASPIRIN 81 MG 81 MG: 81 TABLET ORAL at 22:19

## 2020-05-09 RX ADMIN — HEPARIN SODIUM 5000 UNITS: 5000 INJECTION INTRAVENOUS; SUBCUTANEOUS at 22:19

## 2020-05-09 RX ADMIN — HEPARIN SODIUM 5000 UNITS: 5000 INJECTION INTRAVENOUS; SUBCUTANEOUS at 05:23

## 2020-05-09 RX ADMIN — LISINOPRIL 40 MG: 20 TABLET ORAL at 22:19

## 2020-05-09 RX ADMIN — ATENOLOL 25 MG: 25 TABLET ORAL at 10:15

## 2020-05-09 RX ADMIN — FENOFIBRATE 48 MG: 48 TABLET, FILM COATED ORAL at 08:04

## 2020-05-09 RX ADMIN — HEPARIN SODIUM 5000 UNITS: 5000 INJECTION INTRAVENOUS; SUBCUTANEOUS at 13:10

## 2020-05-09 RX ADMIN — ATORVASTATIN CALCIUM 40 MG: 40 TABLET, FILM COATED ORAL at 17:23

## 2020-05-10 ENCOUNTER — APPOINTMENT (OUTPATIENT)
Dept: NON INVASIVE DIAGNOSTICS | Facility: HOSPITAL | Age: 65
End: 2020-05-10
Payer: COMMERCIAL

## 2020-05-10 PROBLEM — Z72.0 TOBACCO ABUSE: Status: ACTIVE | Noted: 2020-05-10

## 2020-05-10 LAB
GLUCOSE SERPL-MCNC: 106 MG/DL (ref 65–140)
GLUCOSE SERPL-MCNC: 109 MG/DL (ref 65–140)
GLUCOSE SERPL-MCNC: 110 MG/DL (ref 65–140)
GLUCOSE SERPL-MCNC: 98 MG/DL (ref 65–140)

## 2020-05-10 PROCEDURE — 99226 PR SBSQ OBSERVATION CARE/DAY 35 MINUTES: CPT | Performed by: INTERNAL MEDICINE

## 2020-05-10 PROCEDURE — 82948 REAGENT STRIP/BLOOD GLUCOSE: CPT

## 2020-05-10 PROCEDURE — 93306 TTE W/DOPPLER COMPLETE: CPT

## 2020-05-10 PROCEDURE — 93306 TTE W/DOPPLER COMPLETE: CPT | Performed by: INTERNAL MEDICINE

## 2020-05-10 PROCEDURE — 99215 OFFICE O/P EST HI 40 MIN: CPT | Performed by: INTERNAL MEDICINE

## 2020-05-10 RX ADMIN — HEPARIN SODIUM 5000 UNITS: 5000 INJECTION INTRAVENOUS; SUBCUTANEOUS at 15:00

## 2020-05-10 RX ADMIN — ASPIRIN 81 MG 81 MG: 81 TABLET ORAL at 22:37

## 2020-05-10 RX ADMIN — HEPARIN SODIUM 5000 UNITS: 5000 INJECTION INTRAVENOUS; SUBCUTANEOUS at 06:48

## 2020-05-10 RX ADMIN — LISINOPRIL 40 MG: 20 TABLET ORAL at 22:36

## 2020-05-10 RX ADMIN — ATORVASTATIN CALCIUM 40 MG: 40 TABLET, FILM COATED ORAL at 16:40

## 2020-05-10 RX ADMIN — HEPARIN SODIUM 5000 UNITS: 5000 INJECTION INTRAVENOUS; SUBCUTANEOUS at 22:37

## 2020-05-10 RX ADMIN — ATENOLOL 25 MG: 25 TABLET ORAL at 08:33

## 2020-05-11 ENCOUNTER — APPOINTMENT (OUTPATIENT)
Dept: INTERVENTIONAL RADIOLOGY/VASCULAR | Facility: HOSPITAL | Age: 65
End: 2020-05-11
Payer: COMMERCIAL

## 2020-05-11 VITALS
HEART RATE: 50 BPM | DIASTOLIC BLOOD PRESSURE: 81 MMHG | SYSTOLIC BLOOD PRESSURE: 149 MMHG | WEIGHT: 167.33 LBS | HEIGHT: 67 IN | OXYGEN SATURATION: 96 % | RESPIRATION RATE: 16 BRPM | TEMPERATURE: 97.7 F | BODY MASS INDEX: 26.26 KG/M2

## 2020-05-11 PROBLEM — I42.9 CARDIOMYOPATHY (HCC): Status: ACTIVE | Noted: 2020-05-11

## 2020-05-11 LAB
ANION GAP SERPL CALCULATED.3IONS-SCNC: 11 MMOL/L (ref 4–13)
BASOPHILS # BLD AUTO: 0.07 THOUSANDS/ΜL (ref 0–0.1)
BASOPHILS NFR BLD AUTO: 1 % (ref 0–1)
BUN SERPL-MCNC: 17 MG/DL (ref 5–25)
CALCIUM SERPL-MCNC: 9.1 MG/DL (ref 8.3–10.1)
CHLORIDE SERPL-SCNC: 109 MMOL/L (ref 100–108)
CO2 SERPL-SCNC: 26 MMOL/L (ref 21–32)
CREAT SERPL-MCNC: 1.22 MG/DL (ref 0.6–1.3)
EOSINOPHIL # BLD AUTO: 0.38 THOUSAND/ΜL (ref 0–0.61)
EOSINOPHIL NFR BLD AUTO: 4 % (ref 0–6)
ERYTHROCYTE [DISTWIDTH] IN BLOOD BY AUTOMATED COUNT: 14.2 % (ref 11.6–15.1)
GFR SERPL CREATININE-BSD FRML MDRD: 62 ML/MIN/1.73SQ M
GLUCOSE P FAST SERPL-MCNC: 123 MG/DL (ref 65–99)
GLUCOSE SERPL-MCNC: 118 MG/DL (ref 65–140)
GLUCOSE SERPL-MCNC: 123 MG/DL (ref 65–140)
GLUCOSE SERPL-MCNC: 94 MG/DL (ref 65–140)
HCT VFR BLD AUTO: 43.6 % (ref 36.5–49.3)
HGB BLD-MCNC: 13.9 G/DL (ref 12–17)
IMM GRANULOCYTES # BLD AUTO: 0.02 THOUSAND/UL (ref 0–0.2)
IMM GRANULOCYTES NFR BLD AUTO: 0 % (ref 0–2)
KCT BLD-ACNC: 218 SEC (ref 89–137)
KCT BLD-ACNC: 236 SEC (ref 89–137)
KCT BLD-ACNC: 329 SEC (ref 89–137)
LYMPHOCYTES # BLD AUTO: 2.74 THOUSANDS/ΜL (ref 0.6–4.47)
LYMPHOCYTES NFR BLD AUTO: 31 % (ref 14–44)
MCH RBC QN AUTO: 28.7 PG (ref 26.8–34.3)
MCHC RBC AUTO-ENTMCNC: 31.9 G/DL (ref 31.4–37.4)
MCV RBC AUTO: 90 FL (ref 82–98)
MONOCYTES # BLD AUTO: 0.83 THOUSAND/ΜL (ref 0.17–1.22)
MONOCYTES NFR BLD AUTO: 9 % (ref 4–12)
NEUTROPHILS # BLD AUTO: 4.91 THOUSANDS/ΜL (ref 1.85–7.62)
NEUTS SEG NFR BLD AUTO: 55 % (ref 43–75)
NRBC BLD AUTO-RTO: 0 /100 WBCS
PLATELET # BLD AUTO: 166 THOUSANDS/UL (ref 149–390)
PMV BLD AUTO: 12.1 FL (ref 8.9–12.7)
POTASSIUM SERPL-SCNC: 3.8 MMOL/L (ref 3.5–5.3)
RBC # BLD AUTO: 4.84 MILLION/UL (ref 3.88–5.62)
SODIUM SERPL-SCNC: 146 MMOL/L (ref 136–145)
SPECIMEN SOURCE: ABNORMAL
WBC # BLD AUTO: 8.95 THOUSAND/UL (ref 4.31–10.16)

## 2020-05-11 PROCEDURE — C1769 GUIDE WIRE: HCPCS | Performed by: PHYSICIAN ASSISTANT

## 2020-05-11 PROCEDURE — 93458 L HRT ARTERY/VENTRICLE ANGIO: CPT | Performed by: PHYSICIAN ASSISTANT

## 2020-05-11 PROCEDURE — 82948 REAGENT STRIP/BLOOD GLUCOSE: CPT

## 2020-05-11 PROCEDURE — 93458 L HRT ARTERY/VENTRICLE ANGIO: CPT | Performed by: INTERNAL MEDICINE

## 2020-05-11 PROCEDURE — 99153 MOD SED SAME PHYS/QHP EA: CPT | Performed by: PHYSICIAN ASSISTANT

## 2020-05-11 PROCEDURE — 80048 BASIC METABOLIC PNL TOTAL CA: CPT | Performed by: INTERNAL MEDICINE

## 2020-05-11 PROCEDURE — 99214 OFFICE O/P EST MOD 30 MIN: CPT | Performed by: INTERNAL MEDICINE

## 2020-05-11 PROCEDURE — 93571 IV DOP VEL&/PRESS C FLO 1ST: CPT | Performed by: INTERNAL MEDICINE

## 2020-05-11 PROCEDURE — 85347 COAGULATION TIME ACTIVATED: CPT

## 2020-05-11 PROCEDURE — 93571 IV DOP VEL&/PRESS C FLO 1ST: CPT | Performed by: PHYSICIAN ASSISTANT

## 2020-05-11 PROCEDURE — 99217 PR OBSERVATION CARE DISCHARGE MANAGEMENT: CPT | Performed by: INTERNAL MEDICINE

## 2020-05-11 PROCEDURE — 85025 COMPLETE CBC W/AUTO DIFF WBC: CPT | Performed by: INTERNAL MEDICINE

## 2020-05-11 PROCEDURE — 99152 MOD SED SAME PHYS/QHP 5/>YRS: CPT | Performed by: INTERNAL MEDICINE

## 2020-05-11 PROCEDURE — 99152 MOD SED SAME PHYS/QHP 5/>YRS: CPT | Performed by: PHYSICIAN ASSISTANT

## 2020-05-11 PROCEDURE — C1894 INTRO/SHEATH, NON-LASER: HCPCS | Performed by: PHYSICIAN ASSISTANT

## 2020-05-11 PROCEDURE — C1887 CATHETER, GUIDING: HCPCS | Performed by: PHYSICIAN ASSISTANT

## 2020-05-11 RX ORDER — ISOSORBIDE DINITRATE 10 MG/1
10 TABLET ORAL
Qty: 120 TABLET | Refills: 0 | Status: SHIPPED | OUTPATIENT
Start: 2020-05-11 | End: 2020-10-27 | Stop reason: ALTCHOICE

## 2020-05-11 RX ORDER — NITROGLYCERIN 20 MG/100ML
INJECTION INTRAVENOUS CODE/TRAUMA/SEDATION MEDICATION
Status: COMPLETED | OUTPATIENT
Start: 2020-05-11 | End: 2020-05-11

## 2020-05-11 RX ORDER — FENTANYL CITRATE 50 UG/ML
INJECTION, SOLUTION INTRAMUSCULAR; INTRAVENOUS CODE/TRAUMA/SEDATION MEDICATION
Status: COMPLETED | OUTPATIENT
Start: 2020-05-11 | End: 2020-05-11

## 2020-05-11 RX ORDER — CARVEDILOL 3.12 MG/1
3.12 TABLET ORAL 2 TIMES DAILY WITH MEALS
Status: DISCONTINUED | OUTPATIENT
Start: 2020-05-11 | End: 2020-05-11 | Stop reason: HOSPADM

## 2020-05-11 RX ORDER — MIDAZOLAM HYDROCHLORIDE 2 MG/2ML
INJECTION, SOLUTION INTRAMUSCULAR; INTRAVENOUS CODE/TRAUMA/SEDATION MEDICATION
Status: COMPLETED | OUTPATIENT
Start: 2020-05-11 | End: 2020-05-11

## 2020-05-11 RX ORDER — LIDOCAINE WITH 8.4% SOD BICARB 0.9%(10ML)
SYRINGE (ML) INJECTION CODE/TRAUMA/SEDATION MEDICATION
Status: COMPLETED | OUTPATIENT
Start: 2020-05-11 | End: 2020-05-11

## 2020-05-11 RX ORDER — ISOSORBIDE DINITRATE 10 MG/1
10 TABLET ORAL
Status: DISCONTINUED | OUTPATIENT
Start: 2020-05-11 | End: 2020-05-11 | Stop reason: HOSPADM

## 2020-05-11 RX ORDER — SODIUM CHLORIDE 9 MG/ML
75 INJECTION, SOLUTION INTRAVENOUS CONTINUOUS
Status: DISCONTINUED | OUTPATIENT
Start: 2020-05-11 | End: 2020-05-11

## 2020-05-11 RX ORDER — CARVEDILOL 6.25 MG/1
6.25 TABLET ORAL 2 TIMES DAILY WITH MEALS
Qty: 60 TABLET | Refills: 2 | Status: CANCELLED | OUTPATIENT
Start: 2020-05-11

## 2020-05-11 RX ORDER — CARVEDILOL 6.25 MG/1
6.25 TABLET ORAL 2 TIMES DAILY WITH MEALS
Status: DISCONTINUED | OUTPATIENT
Start: 2020-05-11 | End: 2020-05-11

## 2020-05-11 RX ORDER — CARVEDILOL 6.25 MG/1
3.12 TABLET ORAL 2 TIMES DAILY WITH MEALS
Qty: 30 TABLET | Refills: 2 | Status: SHIPPED | OUTPATIENT
Start: 2020-05-12 | End: 2020-08-11 | Stop reason: SDUPTHER

## 2020-05-11 RX ORDER — INSULIN ASPART 100 [IU]/ML
10 INJECTION, SUSPENSION SUBCUTANEOUS
Refills: 0
Start: 2020-05-11 | End: 2020-08-31 | Stop reason: ALTCHOICE

## 2020-05-11 RX ORDER — SODIUM CHLORIDE 9 MG/ML
75 INJECTION, SOLUTION INTRAVENOUS CONTINUOUS
Status: DISPENSED | OUTPATIENT
Start: 2020-05-11 | End: 2020-05-11

## 2020-05-11 RX ORDER — HEPARIN SODIUM 1000 [USP'U]/ML
INJECTION, SOLUTION INTRAVENOUS; SUBCUTANEOUS CODE/TRAUMA/SEDATION MEDICATION
Status: COMPLETED | OUTPATIENT
Start: 2020-05-11 | End: 2020-05-11

## 2020-05-11 RX ORDER — VERAPAMIL HYDROCHLORIDE 2.5 MG/ML
INJECTION, SOLUTION INTRAVENOUS CODE/TRAUMA/SEDATION MEDICATION
Status: COMPLETED | OUTPATIENT
Start: 2020-05-11 | End: 2020-05-11

## 2020-05-11 RX ORDER — ATORVASTATIN CALCIUM 40 MG/1
40 TABLET, FILM COATED ORAL
Qty: 30 TABLET | Refills: 2 | Status: SHIPPED | OUTPATIENT
Start: 2020-05-11 | End: 2020-09-09 | Stop reason: SDUPTHER

## 2020-05-11 RX ADMIN — ATORVASTATIN CALCIUM 40 MG: 40 TABLET, FILM COATED ORAL at 16:12

## 2020-05-11 RX ADMIN — VERAPAMIL HYDROCHLORIDE 2.5 MG: 2.5 INJECTION, SOLUTION INTRAVENOUS at 11:01

## 2020-05-11 RX ADMIN — FENTANYL CITRATE 25 MCG: 50 INJECTION, SOLUTION INTRAMUSCULAR; INTRAVENOUS at 11:19

## 2020-05-11 RX ADMIN — MIDAZOLAM HYDROCHLORIDE 0.5 MG: 1 INJECTION, SOLUTION INTRAMUSCULAR; INTRAVENOUS at 11:18

## 2020-05-11 RX ADMIN — MIDAZOLAM HYDROCHLORIDE 1 MG: 1 INJECTION, SOLUTION INTRAMUSCULAR; INTRAVENOUS at 10:56

## 2020-05-11 RX ADMIN — METOPROLOL TARTRATE 12.5 MG: 25 TABLET ORAL at 08:04

## 2020-05-11 RX ADMIN — SODIUM CHLORIDE 75 ML/HR: 0.9 INJECTION, SOLUTION INTRAVENOUS at 12:15

## 2020-05-11 RX ADMIN — FENTANYL CITRATE 50 MCG: 50 INJECTION, SOLUTION INTRAMUSCULAR; INTRAVENOUS at 10:56

## 2020-05-11 RX ADMIN — NITROGLYCERIN 200 MCG: 20 INJECTION INTRAVENOUS at 11:02

## 2020-05-11 RX ADMIN — Medication 3 ML: at 10:59

## 2020-05-11 RX ADMIN — HEPARIN SODIUM 5000 UNITS: 1000 INJECTION INTRAVENOUS; SUBCUTANEOUS at 11:04

## 2020-05-11 RX ADMIN — SODIUM CHLORIDE 75 ML/HR: 0.9 INJECTION, SOLUTION INTRAVENOUS at 08:05

## 2020-05-11 RX ADMIN — CARVEDILOL 3.12 MG: 3.12 TABLET, FILM COATED ORAL at 16:12

## 2020-05-11 RX ADMIN — ISOSORBIDE DINITRATE 10 MG: 10 TABLET ORAL at 17:27

## 2020-05-11 RX ADMIN — IODIXANOL 75 ML: 320 INJECTION, SOLUTION INTRAVASCULAR at 11:39

## 2020-05-12 ENCOUNTER — TELEPHONE (OUTPATIENT)
Dept: CARDIOLOGY CLINIC | Facility: CLINIC | Age: 65
End: 2020-05-12

## 2020-05-26 ENCOUNTER — OFFICE VISIT (OUTPATIENT)
Dept: CARDIOLOGY CLINIC | Facility: CLINIC | Age: 65
End: 2020-05-26
Payer: COMMERCIAL

## 2020-05-26 VITALS
OXYGEN SATURATION: 97 % | DIASTOLIC BLOOD PRESSURE: 62 MMHG | WEIGHT: 153 LBS | SYSTOLIC BLOOD PRESSURE: 116 MMHG | BODY MASS INDEX: 24.01 KG/M2 | HEIGHT: 67 IN | HEART RATE: 73 BPM

## 2020-05-26 DIAGNOSIS — E78.5 DYSLIPIDEMIA: ICD-10-CM

## 2020-05-26 DIAGNOSIS — I25.10 ATHEROSCLEROSIS OF NATIVE CORONARY ARTERY OF NATIVE HEART WITHOUT ANGINA PECTORIS: Primary | ICD-10-CM

## 2020-05-26 DIAGNOSIS — F17.200 SMOKER: ICD-10-CM

## 2020-05-26 DIAGNOSIS — E11.9 DIABETES MELLITUS WITHOUT COMPLICATION (HCC): ICD-10-CM

## 2020-05-26 DIAGNOSIS — I10 ESSENTIAL HYPERTENSION: ICD-10-CM

## 2020-05-26 PROCEDURE — 3066F NEPHROPATHY DOC TX: CPT | Performed by: INTERNAL MEDICINE

## 2020-05-26 PROCEDURE — 3044F HG A1C LEVEL LT 7.0%: CPT | Performed by: INTERNAL MEDICINE

## 2020-05-26 PROCEDURE — 3078F DIAST BP <80 MM HG: CPT | Performed by: INTERNAL MEDICINE

## 2020-05-26 PROCEDURE — 99213 OFFICE O/P EST LOW 20 MIN: CPT | Performed by: INTERNAL MEDICINE

## 2020-05-26 PROCEDURE — 3008F BODY MASS INDEX DOCD: CPT | Performed by: INTERNAL MEDICINE

## 2020-05-26 PROCEDURE — 3074F SYST BP LT 130 MM HG: CPT | Performed by: INTERNAL MEDICINE

## 2020-05-28 ENCOUNTER — OFFICE VISIT (OUTPATIENT)
Dept: FAMILY MEDICINE CLINIC | Facility: CLINIC | Age: 65
End: 2020-05-28
Payer: COMMERCIAL

## 2020-05-28 VITALS
BODY MASS INDEX: 24.01 KG/M2 | WEIGHT: 153 LBS | DIASTOLIC BLOOD PRESSURE: 94 MMHG | HEIGHT: 67 IN | OXYGEN SATURATION: 99 % | TEMPERATURE: 97.1 F | HEART RATE: 74 BPM | SYSTOLIC BLOOD PRESSURE: 152 MMHG

## 2020-05-28 DIAGNOSIS — E78.5 DYSLIPIDEMIA: ICD-10-CM

## 2020-05-28 DIAGNOSIS — Z12.11 SCREEN FOR COLON CANCER: ICD-10-CM

## 2020-05-28 DIAGNOSIS — Z12.5 SCREENING FOR PROSTATE CANCER: ICD-10-CM

## 2020-05-28 DIAGNOSIS — Z72.0 TOBACCO ABUSE: ICD-10-CM

## 2020-05-28 DIAGNOSIS — F41.1 GENERALIZED ANXIETY DISORDER: ICD-10-CM

## 2020-05-28 DIAGNOSIS — I42.9 CARDIOMYOPATHY, UNSPECIFIED TYPE (HCC): ICD-10-CM

## 2020-05-28 DIAGNOSIS — E11.9 DIABETES MELLITUS TYPE 2 IN NONOBESE (HCC): Primary | ICD-10-CM

## 2020-05-28 DIAGNOSIS — Z11.59 ENCOUNTER FOR HEPATITIS C SCREENING TEST FOR LOW RISK PATIENT: ICD-10-CM

## 2020-05-28 DIAGNOSIS — I10 ESSENTIAL HYPERTENSION: ICD-10-CM

## 2020-05-28 PROBLEM — I25.119 CORONARY ARTERY DISEASE INVOLVING NATIVE CORONARY ARTERY OF NATIVE HEART WITH ANGINA PECTORIS (HCC): Status: ACTIVE | Noted: 2020-05-28

## 2020-05-28 LAB
LEFT EYE DIABETIC RETINOPATHY: NORMAL
LEFT EYE IMAGE QUALITY: NORMAL
LEFT EYE MACULAR EDEMA: NORMAL
LEFT EYE OTHER RETINOPATHY: NORMAL
RIGHT EYE DIABETIC RETINOPATHY: NORMAL
RIGHT EYE IMAGE QUALITY: NORMAL
RIGHT EYE MACULAR EDEMA: NORMAL
RIGHT EYE OTHER RETINOPATHY: NORMAL
SEVERITY (EYE EXAM): NORMAL

## 2020-05-28 PROCEDURE — 3044F HG A1C LEVEL LT 7.0%: CPT | Performed by: PHYSICIAN ASSISTANT

## 2020-05-28 PROCEDURE — 99204 OFFICE O/P NEW MOD 45 MIN: CPT | Performed by: PHYSICIAN ASSISTANT

## 2020-05-28 PROCEDURE — 3077F SYST BP >= 140 MM HG: CPT | Performed by: PHYSICIAN ASSISTANT

## 2020-05-28 PROCEDURE — 3008F BODY MASS INDEX DOCD: CPT | Performed by: PHYSICIAN ASSISTANT

## 2020-05-28 PROCEDURE — 3080F DIAST BP >= 90 MM HG: CPT | Performed by: PHYSICIAN ASSISTANT

## 2020-05-28 PROCEDURE — 3066F NEPHROPATHY DOC TX: CPT | Performed by: PHYSICIAN ASSISTANT

## 2020-05-28 PROCEDURE — 2022F DILAT RTA XM EVC RTNOPTHY: CPT | Performed by: PHYSICIAN ASSISTANT

## 2020-05-28 RX ORDER — BUSPIRONE HYDROCHLORIDE 7.5 MG/1
7.5 TABLET ORAL 2 TIMES DAILY
Qty: 60 TABLET | Refills: 2 | Status: SHIPPED | OUTPATIENT
Start: 2020-05-28 | End: 2020-08-31 | Stop reason: SDUPTHER

## 2020-05-28 RX ORDER — HYDROCHLOROTHIAZIDE 12.5 MG/1
12.5 TABLET ORAL DAILY
Qty: 30 TABLET | Refills: 2 | Status: SHIPPED | OUTPATIENT
Start: 2020-05-28 | End: 2020-12-04

## 2020-07-09 DIAGNOSIS — I10 ESSENTIAL HYPERTENSION: Primary | ICD-10-CM

## 2020-07-09 RX ORDER — LISINOPRIL 40 MG/1
40 TABLET ORAL DAILY
Qty: 90 TABLET | Refills: 1 | Status: SHIPPED | OUTPATIENT
Start: 2020-07-09 | End: 2021-01-21 | Stop reason: SDUPTHER

## 2020-08-11 DIAGNOSIS — I10 ESSENTIAL HYPERTENSION: ICD-10-CM

## 2020-08-11 RX ORDER — CARVEDILOL 6.25 MG/1
3.12 TABLET ORAL 2 TIMES DAILY WITH MEALS
Qty: 90 TABLET | Refills: 2 | Status: SHIPPED | OUTPATIENT
Start: 2020-08-11 | End: 2021-04-12

## 2020-08-17 NOTE — ASSESSMENT & PLAN NOTE
Continue with beta-blocker and Norvasc    Resume ACE-inhibitor when creatinine stable Refill request received from [facility] for metformin.  One refill given.  Patient has next appointment 12/2020.

## 2020-08-31 ENCOUNTER — OFFICE VISIT (OUTPATIENT)
Dept: FAMILY MEDICINE CLINIC | Facility: CLINIC | Age: 65
End: 2020-08-31

## 2020-08-31 VITALS
BODY MASS INDEX: 23.13 KG/M2 | HEIGHT: 67 IN | OXYGEN SATURATION: 94 % | HEART RATE: 79 BPM | SYSTOLIC BLOOD PRESSURE: 136 MMHG | WEIGHT: 147.4 LBS | DIASTOLIC BLOOD PRESSURE: 70 MMHG

## 2020-08-31 DIAGNOSIS — I10 ESSENTIAL HYPERTENSION: ICD-10-CM

## 2020-08-31 DIAGNOSIS — I42.9 CARDIOMYOPATHY, UNSPECIFIED TYPE (HCC): ICD-10-CM

## 2020-08-31 DIAGNOSIS — Z86.010 HISTORY OF COLON POLYPS: ICD-10-CM

## 2020-08-31 DIAGNOSIS — Z72.0 TOBACCO ABUSE: ICD-10-CM

## 2020-08-31 DIAGNOSIS — Z23 ENCOUNTER FOR IMMUNIZATION: ICD-10-CM

## 2020-08-31 DIAGNOSIS — I25.119 CORONARY ARTERY DISEASE INVOLVING NATIVE CORONARY ARTERY OF NATIVE HEART WITH ANGINA PECTORIS (HCC): ICD-10-CM

## 2020-08-31 DIAGNOSIS — E11.9 DIABETES MELLITUS TYPE 2 IN NONOBESE (HCC): Primary | ICD-10-CM

## 2020-08-31 DIAGNOSIS — F41.1 GENERALIZED ANXIETY DISORDER: ICD-10-CM

## 2020-08-31 LAB — SL AMB POCT HEMOGLOBIN AIC: 5.6 (ref ?–6.5)

## 2020-08-31 PROCEDURE — 99214 OFFICE O/P EST MOD 30 MIN: CPT | Performed by: PHYSICIAN ASSISTANT

## 2020-08-31 PROCEDURE — 83036 HEMOGLOBIN GLYCOSYLATED A1C: CPT | Performed by: PHYSICIAN ASSISTANT

## 2020-08-31 PROCEDURE — 3066F NEPHROPATHY DOC TX: CPT | Performed by: PHYSICIAN ASSISTANT

## 2020-08-31 PROCEDURE — 3008F BODY MASS INDEX DOCD: CPT | Performed by: PHYSICIAN ASSISTANT

## 2020-08-31 PROCEDURE — 2022F DILAT RTA XM EVC RTNOPTHY: CPT | Performed by: PHYSICIAN ASSISTANT

## 2020-08-31 PROCEDURE — 90471 IMMUNIZATION ADMIN: CPT | Performed by: PHYSICIAN ASSISTANT

## 2020-08-31 PROCEDURE — 3075F SYST BP GE 130 - 139MM HG: CPT | Performed by: PHYSICIAN ASSISTANT

## 2020-08-31 PROCEDURE — 3078F DIAST BP <80 MM HG: CPT | Performed by: PHYSICIAN ASSISTANT

## 2020-08-31 PROCEDURE — 3044F HG A1C LEVEL LT 7.0%: CPT | Performed by: PHYSICIAN ASSISTANT

## 2020-08-31 PROCEDURE — 90682 RIV4 VACC RECOMBINANT DNA IM: CPT | Performed by: PHYSICIAN ASSISTANT

## 2020-08-31 RX ORDER — BUSPIRONE HYDROCHLORIDE 7.5 MG/1
7.5 TABLET ORAL 2 TIMES DAILY
Qty: 60 TABLET | Refills: 2 | Status: SHIPPED | OUTPATIENT
Start: 2020-08-31 | End: 2020-09-09 | Stop reason: ALTCHOICE

## 2020-08-31 NOTE — PROGRESS NOTES
Assessment/Plan:       Problem List Items Addressed This Visit        Endocrine    Diabetes mellitus type 2 in nonobese ([facility] ) - Primary    Relevant Orders    POCT hemoglobin A1c (Completed)       Cardiovascular and Mediastinum    Essential hypertension    Cardiomyopathy ([facility] )    Coronary artery disease involving native coronary artery of native heart with angina pectoris ([facility] )       Other    Tobacco abuse    Generalized anxiety disorder    Relevant Medications    busPIRone (BUSPAR) 7 5 mg tablet      Other Visit Diagnoses     Encounter for immunization        Relevant Orders    influenza vaccine, quadrivalent, recombinant, PF, 0 5 mL, for patients 18 yr+ (FLUBLOK) (Completed)    History of colon polyps        Relevant Orders    Ambulatory referral to Colorectal Surgery        Chronic conditions well controlled, very proud of patients weight loss and lifestyle modifications to reduce his a1c  Encouraged completion of labs, follow with cardiology, complete CRC screening and follow up in 3 months  Highly encouraged smoking cessation, pt is not ready at this time  Subjective:      Patient ID: Gaynell Baumgarten is a 59 y o  male  Pt presents today for 3 months follow up  DM: last a1c was 5 3, agreed to stop insulin at that time  Pt did not have labs complete prior to visit today  a1c in the office is 5 6  He shares his sugars have consistently been between   He has continued to lose weight and his BMI is now in normal range at 23  He continues on metformin and glimeperide  Pt is happy with his improved a1c which has decreased from 15 5 in 12/2019    CAD: 70% stenosis L PL noted on cath from 5/2020  No anginal complaints at rest or upon exertion  Follows with cardiology  Denies cardiac sx, no lightheadedness, dizziness, palpitations  Pt does continue to smoke daily  HTN: 136/70 today, well controlled    Anxiety: continues to do well on buspar, shares he feels he needs to stay on the medication       He has no acute complaints today          The following portions of the patient's history were reviewed and updated as appropriate:   He  has a past medical history of Diabetes mellitus ([place] [address] ), Hyperlipidemia, and Hypertension  He   Patient Active Problem List    Diagnosis Date Noted    Coronary artery disease involving native coronary artery of native heart with angina pectoris ([place] [address] ) 05/28/2020    Generalized anxiety disorder 05/28/2020    Cardiomyopathy ([place] [address] ) 05/11/2020    Tobacco abuse 05/10/2020    Chest pain 05/08/2020    Diabetes mellitus type 2 in nonobese ([place] [address] ) 05/08/2020    Hyperosmolar non-ketotic state in patient with type 2 diabetes mellitus ([place] [address] ) 12/05/2019    JOELLE (acute kidney injury) ([place] [address] ) 12/05/2019    Essential hypertension 12/05/2019    Hyperosmolar hyponatremia 12/05/2019    Dyslipidemia 12/05/2019    Abnormal transaminases 12/05/2019    Leukocytosis 12/05/2019     He  has a past surgical history that includes Knee surgery (Right) and Colonoscopy (2010)  His family history includes Dementia in his mother; Diabetes in his mother; Hyperlipidemia in his father and mother  He  reports that he has been smoking cigarettes  He has a 10 00 pack-year smoking history  He has never used smokeless tobacco  He reports that he does not drink alcohol or use drugs    Current Outpatient Medications   Medication Sig Dispense Refill    amLODIPine (NORVASC) 10 mg tablet Take 10 mg by mouth daily      aspirin 81 mg chewable tablet Chew 1 tablet (81 mg total) daily 20 tablet 0    atorvastatin (LIPITOR) 40 mg tablet Take 1 tablet (40 mg total) by mouth daily with dinner 30 tablet 2    busPIRone (BUSPAR) 7 5 mg tablet Take 1 tablet (7 5 mg total) by mouth 2 (two) times a day 60 tablet 2    carvedilol (COREG) 6 25 mg tablet Take 0 5 tablets (3 125 mg total) by mouth 2 (two) times a day with meals 90 tablet 2    glimepiride (AMARYL) 2 mg tablet Take 1 tablet (2 mg total) by mouth daily with breakfast 30 tablet 2    hydrochlorothiazide (HYDRODIURIL) 12 5 mg tablet Take 1 tablet (12 5 mg total) by mouth daily 30 tablet 2    isosorbide dinitrate (ISORDIL) 10 mg tablet Take 1 tablet (10 mg total) by mouth 2 (two) times daily after meals 120 tablet 0    lisinopril (ZESTRIL) 40 mg tablet Take 1 tablet (40 mg total) by mouth daily 90 tablet 1    metFORMIN (GLUCOPHAGE) 500 mg tablet Take 1 tablet (500 mg total) by mouth daily with breakfast Resume no earlier than 5/14/2020  0    Multiple Vitamins-Minerals (MULTIVITAMIN WITH MINERALS) tablet Take 1 tablet by mouth daily       No current facility-administered medications for this visit        Current Outpatient Medications on File Prior to Visit   Medication Sig    amLODIPine (NORVASC) 10 mg tablet Take 10 mg by mouth daily    aspirin 81 mg chewable tablet Chew 1 tablet (81 mg total) daily    atorvastatin (LIPITOR) 40 mg tablet Take 1 tablet (40 mg total) by mouth daily with dinner    carvedilol (COREG) 6 25 mg tablet Take 0 5 tablets (3 125 mg total) by mouth 2 (two) times a day with meals    glimepiride (AMARYL) 2 mg tablet Take 1 tablet (2 mg total) by mouth daily with breakfast    hydrochlorothiazide (HYDRODIURIL) 12 5 mg tablet Take 1 tablet (12 5 mg total) by mouth daily    isosorbide dinitrate (ISORDIL) 10 mg tablet Take 1 tablet (10 mg total) by mouth 2 (two) times daily after meals    lisinopril (ZESTRIL) 40 mg tablet Take 1 tablet (40 mg total) by mouth daily    metFORMIN (GLUCOPHAGE) 500 mg tablet Take 1 tablet (500 mg total) by mouth daily with breakfast Resume no earlier than 5/14/2020    Multiple Vitamins-Minerals (MULTIVITAMIN WITH MINERALS) tablet Take 1 tablet by mouth daily    [DISCONTINUED] busPIRone (BUSPAR) 7 5 mg tablet Take 1 tablet (7 5 mg total) by mouth 2 (two) times a day    [DISCONTINUED] insulin aspart protamine-insulin aspart (NovoLOG 70/30) 100 units/mL injection Inject 10 Units under the skin 2 (two) times a day before meals Can substitute with the cheapest available 70/30 insulin  Please also provide patient with insulin syringes # 100 with 6 refills  Please also provide with needles #100 with 6 refills     No current facility-administered medications on file prior to visit  He has No Known Allergies       Review of Systems   Constitutional: Negative for chills, fatigue and fever  HENT: Negative for congestion, ear pain, hearing loss, nosebleeds, postnasal drip, rhinorrhea, sinus pressure, sinus pain, sneezing and sore throat  Eyes: Negative for pain, discharge, itching and visual disturbance  Respiratory: Negative for cough, chest tightness, shortness of breath and wheezing  Cardiovascular: Negative for chest pain, palpitations and leg swelling  Gastrointestinal: Negative for abdominal pain, blood in stool, constipation, diarrhea, nausea and vomiting  Genitourinary: Negative for frequency and urgency  Musculoskeletal: Negative  Skin: Negative  Neurological: Negative for dizziness, light-headedness and numbness  Psychiatric/Behavioral: Negative for decreased concentration and sleep disturbance  The patient is nervous/anxious  Objective:      /70   Pulse 79   Ht 5' 7" (1 702 m)   Wt 66 9 kg (147 lb 6 4 oz)   SpO2 94%   BMI 23 09 kg/m²          Physical Exam  Vitals signs and nursing note reviewed  Constitutional:       General: He is not in acute distress  Appearance: Normal appearance  HENT:      Head: Normocephalic and atraumatic  Right Ear: Tympanic membrane, ear canal and external ear normal  There is no impacted cerumen  Left Ear: Tympanic membrane, ear canal and external ear normal  There is no impacted cerumen  Nose: Nose normal  No congestion  Mouth/Throat:      Mouth: Mucous membranes are moist       Pharynx: Oropharynx is clear  No oropharyngeal exudate or posterior oropharyngeal erythema     Eyes:      Conjunctiva/sclera: Conjunctivae normal  Pupils: Pupils are equal, round, and reactive to light  Neck:      Musculoskeletal: Normal range of motion and neck supple  Vascular: No carotid bruit  Cardiovascular:      Rate and Rhythm: Normal rate and regular rhythm  Heart sounds: Murmur (3/6 RUSB) present  Pulmonary:      Effort: Pulmonary effort is normal  No respiratory distress  Breath sounds: Wheezing (some scattered diffuse wheezes) present  No rhonchi  Abdominal:      General: Bowel sounds are normal  There is no distension  Palpations: Abdomen is soft  There is no mass  Tenderness: There is no abdominal tenderness  There is no guarding  Musculoskeletal: Normal range of motion  Right lower leg: No edema  Left lower leg: No edema  Lymphadenopathy:      Cervical: No cervical adenopathy  Skin:     General: Skin is warm and dry  Coloration: Skin is not pale  Findings: No erythema or rash  Neurological:      Mental Status: He is alert and oriented to person, place, and time     Psychiatric:         Mood and Affect: Mood and affect normal

## 2020-09-01 ENCOUNTER — TELEPHONE (OUTPATIENT)
Dept: ADMINISTRATIVE | Facility: OTHER | Age: 65
End: 2020-09-01

## 2020-09-09 ENCOUNTER — TELEPHONE (OUTPATIENT)
Dept: FAMILY MEDICINE CLINIC | Facility: CLINIC | Age: 65
End: 2020-09-09

## 2020-09-09 DIAGNOSIS — E78.5 DYSLIPIDEMIA: ICD-10-CM

## 2020-09-09 DIAGNOSIS — F41.1 GENERALIZED ANXIETY DISORDER: Primary | ICD-10-CM

## 2020-09-09 DIAGNOSIS — F41.1 GENERALIZED ANXIETY DISORDER: ICD-10-CM

## 2020-09-09 RX ORDER — ATORVASTATIN CALCIUM 40 MG/1
40 TABLET, FILM COATED ORAL
Qty: 30 TABLET | Refills: 2 | Status: SHIPPED | OUTPATIENT
Start: 2020-09-09 | End: 2020-12-18

## 2020-09-09 RX ORDER — BUSPIRONE HYDROCHLORIDE 10 MG/1
10 TABLET ORAL 2 TIMES DAILY
Qty: 60 TABLET | Refills: 2 | Status: SHIPPED | OUTPATIENT
Start: 2020-09-09 | End: 2020-12-28

## 2020-09-09 NOTE — TELEPHONE ENCOUNTER
Patient called in and asked about getting 10 mg of buspar instead of 7 5 mg because the copay is cheaper  He [facility] along with Atorvastatin   Thank you

## 2020-09-20 DIAGNOSIS — E11.00 HYPEROSMOLAR NON-KETOTIC STATE IN PATIENT WITH TYPE 2 DIABETES MELLITUS (HCC): ICD-10-CM

## 2020-09-21 RX ORDER — GLIMEPIRIDE 2 MG/1
2 TABLET ORAL
Qty: 30 TABLET | Refills: 0 | Status: SHIPPED | OUTPATIENT
Start: 2020-09-21 | End: 2020-10-16

## 2020-10-16 DIAGNOSIS — I10 ESSENTIAL HYPERTENSION: Primary | ICD-10-CM

## 2020-10-16 DIAGNOSIS — E11.00 HYPEROSMOLAR NON-KETOTIC STATE IN PATIENT WITH TYPE 2 DIABETES MELLITUS (HCC): ICD-10-CM

## 2020-10-16 DIAGNOSIS — E11.9 DIABETES MELLITUS TYPE 2 IN NONOBESE (HCC): ICD-10-CM

## 2020-10-16 RX ORDER — AMLODIPINE BESYLATE 10 MG/1
10 TABLET ORAL DAILY
Qty: 30 TABLET | Refills: 5 | Status: SHIPPED | OUTPATIENT
Start: 2020-10-16 | End: 2021-04-12

## 2020-10-16 RX ORDER — GLIMEPIRIDE 2 MG/1
TABLET ORAL
Qty: 30 TABLET | Refills: 0 | Status: SHIPPED | OUTPATIENT
Start: 2020-10-16 | End: 2020-11-18

## 2020-10-27 DIAGNOSIS — E11.9 DIABETES MELLITUS TYPE 2 IN NONOBESE (HCC): ICD-10-CM

## 2020-11-18 DIAGNOSIS — E11.00 HYPEROSMOLAR NON-KETOTIC STATE IN PATIENT WITH TYPE 2 DIABETES MELLITUS (HCC): ICD-10-CM

## 2020-11-18 RX ORDER — GLIMEPIRIDE 2 MG/1
TABLET ORAL
Qty: 30 TABLET | Refills: 0 | Status: SHIPPED | OUTPATIENT
Start: 2020-11-18 | End: 2020-12-18

## 2020-12-04 DIAGNOSIS — I10 ESSENTIAL HYPERTENSION: ICD-10-CM

## 2020-12-04 RX ORDER — HYDROCHLOROTHIAZIDE 12.5 MG/1
TABLET ORAL
Qty: 30 TABLET | Refills: 0 | Status: SHIPPED | OUTPATIENT
Start: 2020-12-04 | End: 2020-12-28

## 2020-12-18 DIAGNOSIS — E78.5 DYSLIPIDEMIA: ICD-10-CM

## 2020-12-18 DIAGNOSIS — E11.00 HYPEROSMOLAR NON-KETOTIC STATE IN PATIENT WITH TYPE 2 DIABETES MELLITUS (HCC): ICD-10-CM

## 2020-12-18 RX ORDER — ATORVASTATIN CALCIUM 40 MG/1
TABLET, FILM COATED ORAL
Qty: 30 TABLET | Refills: 2 | Status: SHIPPED | OUTPATIENT
Start: 2020-12-18 | End: 2021-03-26

## 2020-12-18 RX ORDER — GLIMEPIRIDE 2 MG/1
TABLET ORAL
Qty: 30 TABLET | Refills: 0 | Status: SHIPPED | OUTPATIENT
Start: 2020-12-18 | End: 2021-01-12

## 2020-12-28 DIAGNOSIS — F41.1 GENERALIZED ANXIETY DISORDER: ICD-10-CM

## 2020-12-28 DIAGNOSIS — I10 ESSENTIAL HYPERTENSION: ICD-10-CM

## 2020-12-28 RX ORDER — HYDROCHLOROTHIAZIDE 12.5 MG/1
TABLET ORAL
Qty: 90 TABLET | Refills: 1 | Status: SHIPPED | OUTPATIENT
Start: 2020-12-28 | End: 2021-05-04 | Stop reason: ALTCHOICE

## 2020-12-28 RX ORDER — BUSPIRONE HYDROCHLORIDE 10 MG/1
TABLET ORAL
Qty: 60 TABLET | Refills: 5 | Status: SHIPPED | OUTPATIENT
Start: 2020-12-28 | End: 2021-08-27

## 2021-01-12 DIAGNOSIS — E11.00 HYPEROSMOLAR NON-KETOTIC STATE IN PATIENT WITH TYPE 2 DIABETES MELLITUS (HCC): ICD-10-CM

## 2021-01-12 RX ORDER — GLIMEPIRIDE 2 MG/1
TABLET ORAL
Qty: 30 TABLET | Refills: 0 | Status: SHIPPED | OUTPATIENT
Start: 2021-01-12 | End: 2021-02-11

## 2021-01-21 DIAGNOSIS — I10 ESSENTIAL HYPERTENSION: ICD-10-CM

## 2021-01-21 RX ORDER — LISINOPRIL 40 MG/1
40 TABLET ORAL DAILY
Qty: 90 TABLET | Refills: 2 | Status: SHIPPED | OUTPATIENT
Start: 2021-01-21 | End: 2021-10-18

## 2021-02-11 DIAGNOSIS — E11.00 HYPEROSMOLAR NON-KETOTIC STATE IN PATIENT WITH TYPE 2 DIABETES MELLITUS (HCC): ICD-10-CM

## 2021-02-11 RX ORDER — GLIMEPIRIDE 2 MG/1
TABLET ORAL
Qty: 30 TABLET | Refills: 0 | Status: SHIPPED | OUTPATIENT
Start: 2021-02-11 | End: 2021-03-17

## 2021-03-02 ENCOUNTER — OFFICE VISIT (OUTPATIENT)
Dept: CARDIOLOGY CLINIC | Facility: CLINIC | Age: 66
End: 2021-03-02
Payer: MEDICARE

## 2021-03-02 VITALS
WEIGHT: 151.4 LBS | SYSTOLIC BLOOD PRESSURE: 162 MMHG | BODY MASS INDEX: 23.76 KG/M2 | HEART RATE: 75 BPM | DIASTOLIC BLOOD PRESSURE: 68 MMHG | HEIGHT: 67 IN | OXYGEN SATURATION: 97 %

## 2021-03-02 DIAGNOSIS — I10 ESSENTIAL HYPERTENSION: ICD-10-CM

## 2021-03-02 DIAGNOSIS — F17.200 SMOKER: ICD-10-CM

## 2021-03-02 DIAGNOSIS — E78.2 MIXED HYPERLIPIDEMIA: ICD-10-CM

## 2021-03-02 DIAGNOSIS — E11.9 DIABETES MELLITUS WITHOUT COMPLICATION (HCC): ICD-10-CM

## 2021-03-02 DIAGNOSIS — I25.10 ATHEROSCLEROSIS OF NATIVE CORONARY ARTERY OF NATIVE HEART WITHOUT ANGINA PECTORIS: Primary | ICD-10-CM

## 2021-03-02 PROCEDURE — 99214 OFFICE O/P EST MOD 30 MIN: CPT | Performed by: INTERNAL MEDICINE

## 2021-03-02 NOTE — PROGRESS NOTES
[facility] [place]  [phone] [facility] [place] [address]  Cardiology Follow Up    Vikki Murrieta  1955  [id number]      1  Atherosclerosis of native coronary artery of native heart without angina pectoris  Echo complete with contrast if indicated    Lipid Panel with Direct LDL reflex   2  Essential hypertension  Echo complete with contrast if indicated    Lipid Panel with Direct LDL reflex   3  Mixed hyperlipidemia  Echo complete with contrast if indicated    Lipid Panel with Direct LDL reflex   4  Diabetes mellitus without complication ([facility] )  Echo complete with contrast if indicated    Lipid Panel with Direct LDL reflex   5  Smoker         Chief Complaint   Patient presents with    Follow-up     Routine follow up       Interval History:    28-year-old male with coronary artery disease( proximal L PL 70% stenosis status post negative IFR in May 2020), hypertension, hyperlipidemia, diabetes mellitus, ex-smoker, left bundle-branch block presented for cardiology follow-up     patient denies any new concerns or complaints since last clinic visit  he denies chest pain, shortness of breath, palpitation, dizziness, orthopnea, leg edema or loss of consciousness  No chest pain or shortness of breath on ambulation  Is not able to exert at baseline due to right knee pain     as per patient home blood pressure stays around 130/70   he has stop isosorbide mononitrate since last clinic visit due to side effect of headache     he has lab work ordered by primary care physician but has not got chance to do it   current medications reviewed   unfortunately he continues to smoke and trying to quit    He has tried patch but did not help    Review of Systems:   all review of system negative except as mentioned above    Patient Active Problem List   Diagnosis    Hyperosmolar non-ketotic state in patient with type 2 diabetes mellitus ([facility] )    JOELLE (acute kidney injury) ([facility] )    Essential hypertension    Hyperosmolar hyponatremia    Dyslipidemia    Abnormal transaminases    Leukocytosis    Chest pain    Diabetes mellitus type 2 in nonobese (HCC)    Tobacco abuse    Cardiomyopathy ([place] [address] )    Coronary artery disease involving native coronary artery of native heart with angina pectoris ([place] [address] )    Generalized anxiety disorder     Past Medical History:   Diagnosis Date    Diabetes mellitus ([place] [address] )     Hyperlipidemia     Hypertension      Social History     Socioeconomic History    Marital status: /Civil Union     Spouse name: Not on file    Number of children: Not on file    Years of education: Not on file    Highest education level: Not on file   Occupational History    Not on file   Social Needs    Financial resource strain: Not on file    Food insecurity     Worry: Not on file     Inability: Not on file   [employer] needs     Medical: Not on file     Non-medical: Not on file   Tobacco Use    Smoking status: Current Some Day Smoker     Packs/day: 0 25     Years: 40 00     Pack years: 10 00     Types: Cigarettes    Smokeless tobacco: Never Used   Substance and Sexual Activity    Alcohol use: Never     Frequency: Never     Comment: 1 mixed drinks/day    Drug use: Never    Sexual activity: Never   Lifestyle    Physical activity     Days per week: Not on file     Minutes per session: Not on file    Stress: Not on file   Relationships    Social connections     Talks on phone: Not on file     Gets together: Not on file     Attends Yazidi service: Not on file     Active member of club or organization: Not on file     Attends meetings of clubs or organizations: Not on file     Relationship status: Not on file    Intimate partner violence     Fear of current or ex partner: Not on file     Emotionally abused: Not on file     Physically abused: Not on file     Forced sexual activity: Not on file   Other Topics Concern    Not on file   Social History Narrative    Not on file      Family History   Problem Relation Age of Onset    Diabetes Mother     Hyperlipidemia Mother     Dementia Mother     Hyperlipidemia Father      Past Surgical History:   Procedure Laterality Date    COLONOSCOPY  2010    KNEE SURGERY Right        Current Outpatient Medications:     amLODIPine (NORVASC) 10 mg tablet, Take 1 tablet (10 mg total) by mouth daily, Disp: 30 tablet, Rfl: 5    aspirin 81 mg chewable tablet, Chew 1 tablet (81 mg total) daily, Disp: 20 tablet, Rfl: 0    atorvastatin (LIPITOR) 40 mg tablet, TAKE 1 TABLET BY MOUTH DAILY WITH DINNER, Disp: 30 tablet, Rfl: 2    busPIRone (BUSPAR) 10 mg tablet, TAKE 1 TABLET BY MOUTH TWICE DAILY, Disp: 60 tablet, Rfl: 5    carvedilol (COREG) 6 25 mg tablet, Take 0 5 tablets (3 125 mg total) by mouth 2 (two) times a day with meals, Disp: 90 tablet, Rfl: 2    glimepiride (AMARYL) 2 mg tablet, TAKE ONE TABLET BY MOUTH EVERY DAY WITH BREAKFAST , Disp: 30 tablet, Rfl: 0    hydrochlorothiazide (HYDRODIURIL) 12 5 mg tablet, TAKE ONE TABLET BY MOUTH EVERY DAY, Disp: 90 tablet, Rfl: 1    lisinopril (ZESTRIL) 40 mg tablet, Take 1 tablet (40 mg total) by mouth daily, Disp: 90 tablet, Rfl: 2    metFORMIN (GLUCOPHAGE) 500 mg tablet, Take 1 tablet (500 mg total) by mouth daily with breakfast, Disp: 90 tablet, Rfl: 2    Multiple Vitamins-Minerals (MULTIVITAMIN WITH MINERALS) tablet, Take 1 tablet by mouth daily, Disp: , Rfl:   No Known Allergies    Labs:  No visits with results within 6 Month(s) from this visit  Latest known visit with results is:   Office Visit on 08/31/2020   Component Date Value    Hemoglobin A1C 08/31/2020 5 6      Imaging: No results found      Physical Exam:  General:  thin, awake, alert and oriented x3, not in distress  Neck: supple, no JVD  Eyes: PERRL, conjunctiva normal  Lungs:  Bilateral air entry positive, no wheeze/rhonchi or crackle  Heart:  S1-S2 normal, no murmur  Abdomen:  Soft ,nondistended ,nontender, bowel sounds positive  Extremities:  No leg edema, no deformity, ROM normal  Neuro:  Moving all extremities, speech clear  Skin: warm, no rash    /68 (BP Location: Left arm, Patient Position: Sitting, Cuff Size: Standard)   Pulse 75   Ht 5' 7" (1 702 m)   Wt 68 7 kg (151 lb 6 4 oz)   SpO2 97%   BMI 23 71 kg/m²     Cardiographics :  Echocardiogram showed LVEF 92-80%, grade 1 diastolic dysfunction, paradoxical septal motion from LBBB, mildly dilated LA, possible atrial septal aneurysm, trace MR, trace TR    Cardiac catheterization on 05/11/2020 showed mid circumflex 50% stenosis, LP L1 60- 70% stenosis status post negative IFR 0 93, L PDA 30% stenosis     above testing results discussed with the patient    Assessment:     1  Coronary artery disease   denies chest pain or shortness of breath    2  Low normal LVEF 45-50% on last echo   clinically patient is not in heart failure     3  Hypertension   high blood pressure reading in clinic today which patient relates it to stress and as per patient average home blood pressure stays around 130/70 at home     3  Hyperlipidemia  on Lipitor  No recent lipid panel to review     4  Diabetes mellitus  Was on insulin currently on metformin  5  Active smoker  Patient strongly advised to quit smoking    he was offered smoking cessation referral but patient wants to try by himself     Recommendations:   patient is doing okay from cardiology standpoint at present time     I would get repeat 2D echocardiogram to evaluate for LVEF and other structural heart disease   patient to continue aspirin, Lipitor 40 mg, carvedilol 3 125 mg twice a day, hydrochlorothiazide 12 5 mg daily, lisinopril 40 mg and Norvasc 10 mg     he was advised to get lab test done   Lipid panel ordered     recommend to take low-salt, low-fat/ low-cholesterol diabetic diet   he was recommend to monitor blood pressure at home and call us back if blood pressure stays more than 130/80     follow-up in Cardiology Clinic 6 months or early if needed  Above all discussed with patient    Patient understands and agrees

## 2021-03-17 DIAGNOSIS — E11.00 HYPEROSMOLAR NON-KETOTIC STATE IN PATIENT WITH TYPE 2 DIABETES MELLITUS (HCC): ICD-10-CM

## 2021-03-17 RX ORDER — GLIMEPIRIDE 2 MG/1
TABLET ORAL
Qty: 30 TABLET | Refills: 0 | Status: SHIPPED | OUTPATIENT
Start: 2021-03-17 | End: 2021-04-12

## 2021-03-17 NOTE — TELEPHONE ENCOUNTER
Refill sent, but pt needs to schedule an appointment with Gillian Tamez for diabetic follow up  Thanks!

## 2021-03-26 DIAGNOSIS — E78.5 DYSLIPIDEMIA: ICD-10-CM

## 2021-03-26 RX ORDER — ATORVASTATIN CALCIUM 40 MG/1
TABLET, FILM COATED ORAL
Qty: 30 TABLET | Refills: 2 | Status: SHIPPED | OUTPATIENT
Start: 2021-03-26 | End: 2021-06-29

## 2021-03-31 ENCOUNTER — HOSPITAL ENCOUNTER (OUTPATIENT)
Dept: NON INVASIVE DIAGNOSTICS | Facility: CLINIC | Age: 66
Discharge: HOME | End: 2021-03-31
Payer: MEDICARE

## 2021-03-31 DIAGNOSIS — E78.2 MIXED HYPERLIPIDEMIA: ICD-10-CM

## 2021-03-31 DIAGNOSIS — I10 ESSENTIAL HYPERTENSION: ICD-10-CM

## 2021-03-31 DIAGNOSIS — I25.10 ATHEROSCLEROSIS OF NATIVE CORONARY ARTERY OF NATIVE HEART WITHOUT ANGINA PECTORIS: ICD-10-CM

## 2021-03-31 DIAGNOSIS — E11.9 DIABETES MELLITUS WITHOUT COMPLICATION (HCC): ICD-10-CM

## 2021-03-31 PROCEDURE — 93306 TTE W/DOPPLER COMPLETE: CPT | Performed by: INTERNAL MEDICINE

## 2021-03-31 PROCEDURE — C8929 TTE W OR WO FOL WCON,DOPPLER: HCPCS

## 2021-03-31 RX ADMIN — PERFLUTREN 0.6 ML/MIN: 6.52 INJECTION, SUSPENSION INTRAVENOUS at 10:00

## 2021-04-11 DIAGNOSIS — I10 ESSENTIAL HYPERTENSION: ICD-10-CM

## 2021-04-11 DIAGNOSIS — E11.00 HYPEROSMOLAR NON-KETOTIC STATE IN PATIENT WITH TYPE 2 DIABETES MELLITUS (HCC): ICD-10-CM

## 2021-04-12 RX ORDER — CARVEDILOL 6.25 MG/1
TABLET ORAL
Qty: 60 TABLET | Refills: 0 | Status: SHIPPED | OUTPATIENT
Start: 2021-04-12 | End: 2021-04-20

## 2021-04-12 RX ORDER — GLIMEPIRIDE 2 MG/1
TABLET ORAL
Qty: 30 TABLET | Refills: 0 | Status: SHIPPED | OUTPATIENT
Start: 2021-04-12 | End: 2021-05-10

## 2021-04-12 RX ORDER — AMLODIPINE BESYLATE 10 MG/1
TABLET ORAL
Qty: 30 TABLET | Refills: 0 | Status: SHIPPED | OUTPATIENT
Start: 2021-04-12 | End: 2021-04-20 | Stop reason: SDUPTHER

## 2021-04-13 DIAGNOSIS — Z23 ENCOUNTER FOR IMMUNIZATION: ICD-10-CM

## 2021-04-14 ENCOUNTER — TELEPHONE (OUTPATIENT)
Dept: FAMILY MEDICINE CLINIC | Facility: CLINIC | Age: 66
End: 2021-04-14

## 2021-04-20 ENCOUNTER — OFFICE VISIT (OUTPATIENT)
Dept: FAMILY MEDICINE CLINIC | Facility: CLINIC | Age: 66
End: 2021-04-20
Payer: MEDICARE

## 2021-04-20 VITALS
SYSTOLIC BLOOD PRESSURE: 160 MMHG | WEIGHT: 151 LBS | DIASTOLIC BLOOD PRESSURE: 82 MMHG | HEIGHT: 67 IN | OXYGEN SATURATION: 97 % | BODY MASS INDEX: 23.7 KG/M2 | RESPIRATION RATE: 18 BRPM

## 2021-04-20 DIAGNOSIS — E11.9 DIABETES MELLITUS TYPE 2 IN NONOBESE (HCC): Primary | ICD-10-CM

## 2021-04-20 DIAGNOSIS — Z72.0 TOBACCO ABUSE: ICD-10-CM

## 2021-04-20 DIAGNOSIS — I42.9 CARDIOMYOPATHY, UNSPECIFIED TYPE (HCC): ICD-10-CM

## 2021-04-20 DIAGNOSIS — Z79.4 TYPE 2 DIABETES MELLITUS WITH HYPERGLYCEMIA, WITH LONG-TERM CURRENT USE OF INSULIN (HCC): ICD-10-CM

## 2021-04-20 DIAGNOSIS — I10 ESSENTIAL HYPERTENSION: ICD-10-CM

## 2021-04-20 DIAGNOSIS — I25.119 CORONARY ARTERY DISEASE INVOLVING NATIVE CORONARY ARTERY OF NATIVE HEART WITH ANGINA PECTORIS (HCC): ICD-10-CM

## 2021-04-20 DIAGNOSIS — M25.512 CHRONIC LEFT SHOULDER PAIN: ICD-10-CM

## 2021-04-20 DIAGNOSIS — G89.29 CHRONIC LEFT SHOULDER PAIN: ICD-10-CM

## 2021-04-20 DIAGNOSIS — R20.0 LEFT ARM NUMBNESS: ICD-10-CM

## 2021-04-20 DIAGNOSIS — Z00.00 MEDICARE ANNUAL WELLNESS VISIT, SUBSEQUENT: ICD-10-CM

## 2021-04-20 DIAGNOSIS — E11.65 TYPE 2 DIABETES MELLITUS WITH HYPERGLYCEMIA, WITH LONG-TERM CURRENT USE OF INSULIN (HCC): ICD-10-CM

## 2021-04-20 DIAGNOSIS — Z12.5 SCREENING FOR PROSTATE CANCER: ICD-10-CM

## 2021-04-20 DIAGNOSIS — Z12.2 ENCOUNTER FOR SCREENING FOR MALIGNANT NEOPLASM OF LUNG IN CURRENT SMOKER WITH 30 PACK YEAR HISTORY OR GREATER: ICD-10-CM

## 2021-04-20 DIAGNOSIS — M54.2 NECK PAIN: ICD-10-CM

## 2021-04-20 DIAGNOSIS — E78.5 DYSLIPIDEMIA: ICD-10-CM

## 2021-04-20 DIAGNOSIS — Z12.11 SCREEN FOR COLON CANCER: ICD-10-CM

## 2021-04-20 DIAGNOSIS — F17.200 ENCOUNTER FOR SCREENING FOR MALIGNANT NEOPLASM OF LUNG IN CURRENT SMOKER WITH 30 PACK YEAR HISTORY OR GREATER: ICD-10-CM

## 2021-04-20 LAB — SL AMB POCT HEMOGLOBIN AIC: 5.7 (ref ?–6.5)

## 2021-04-20 PROCEDURE — G0402 INITIAL PREVENTIVE EXAM: HCPCS | Performed by: PHYSICIAN ASSISTANT

## 2021-04-20 PROCEDURE — 83036 HEMOGLOBIN GLYCOSYLATED A1C: CPT | Performed by: PHYSICIAN ASSISTANT

## 2021-04-20 PROCEDURE — 1123F ACP DISCUSS/DSCN MKR DOCD: CPT | Performed by: PHYSICIAN ASSISTANT

## 2021-04-20 PROCEDURE — 99214 OFFICE O/P EST MOD 30 MIN: CPT | Performed by: PHYSICIAN ASSISTANT

## 2021-04-20 RX ORDER — AMLODIPINE BESYLATE 10 MG/1
10 TABLET ORAL DAILY
Qty: 90 TABLET | Refills: 1 | Status: SHIPPED | OUTPATIENT
Start: 2021-04-20 | End: 2021-04-22

## 2021-04-20 RX ORDER — CARVEDILOL 6.25 MG/1
6.25 TABLET ORAL 2 TIMES DAILY WITH MEALS
Qty: 60 TABLET | Refills: 0
Start: 2021-04-20 | End: 2021-05-20

## 2021-04-20 NOTE — PATIENT INSTRUCTIONS
Medicare Preventive Visit Patient Instructions  Thank you for completing your Welcome to Medicare Visit or Medicare Annual Wellness Visit today  Your next wellness visit will be due in one year (4/21/2022)  The screening/preventive services that you may require over the next 5-10 years are detailed below  Some tests may not apply to you based off risk factors and/or age  Screening tests ordered at today's visit but not completed yet may show as past due  Also, please note that scanned in results may not display below  Preventive Screenings:  Service Recommendations Previous Testing/Comments   Colorectal Cancer Screening  · Colonoscopy    · Fecal Occult Blood Test (FOBT)/Fecal Immunochemical Test (FIT)  · Fecal DNA/Cologuard Test  · Flexible Sigmoidoscopy Age: 54-65 years old   Colonoscopy: every 10 years (May be performed more frequently if at higher risk)  OR  FOBT/FIT: every 1 year  OR  Cologuard: every 3 years  OR  Sigmoidoscopy: every 5 years  Screening may be recommended earlier than age 48 if at higher risk for colorectal cancer  Also, an individualized decision between you and your healthcare provider will decide whether screening between the ages of 74-80 would be appropriate   Colonoscopy: Not on file  FOBT/FIT: Not on file  Cologuard: Not on file  Sigmoidoscopy: Not on file          Prostate Cancer Screening Individualized decision between patient and health care provider in men between ages of 53-78   Medicare will cover every 12 months beginning on the day after your 50th birthday PSA: No results in last 5 years           Hepatitis C Screening Once for adults born between [facility]  More frequently in patients at high risk for Hepatitis C Hep C Antibody: Not on file        Diabetes Screening 1-2 times per year if you're at risk for diabetes or have pre-diabetes Fasting glucose: 123 mg/dL   A1C: 5 6    Screening Not Indicated  History Diabetes   Cholesterol Screening Once every 5 years if you don't have a lipid disorder  May order more often based on risk factors  Lipid panel: 05/09/2020    Screening Not Indicated  History Lipid Disorder      Other Preventive Screenings Covered by Medicare:  1  Abdominal Aortic Aneurysm (AAA) Screening: covered once if your at risk  You're considered to be at risk if you have a family history of AAA or a male between the age of 73-68 who smoking at least 100 cigarettes in your lifetime  2  Lung Cancer Screening: covers low dose CT scan once per year if you meet all of the following conditions: (1) Age 50-69; (2) No signs or symptoms of lung cancer; (3) Current smoker or have quit smoking within the last 15 years; (4) You have a tobacco smoking history of at least 30 pack years (packs per day x number of years you smoked); (5) You get a written order from a healthcare provider  3  Glaucoma Screening: covered annually if you're considered high risk: (1) You have diabetes OR (2) Family history of glaucoma OR (3)  aged 48 and older OR (3)  American aged 72 and older  3  Osteoporosis Screening: covered every 2 years if you meet one of the following conditions: (1) Have a vertebral abnormality; (2) On glucocorticoid therapy for more than 3 months; (3) Have primary hyperparathyroidism; (4) On osteoporosis medications and need to assess response to drug therapy  5  HIV Screening: covered annually if you're between the age of 12-76  Also covered annually if you are younger than 13 and older than 72 with risk factors for HIV infection  For pregnant patients, it is covered up to 3 times per pregnancy      Immunizations:  Immunization Recommendations   Influenza Vaccine Annual influenza vaccination during flu season is recommended for all persons aged >= 6 months who do not have contraindications   Pneumococcal Vaccine (Prevnar and Pneumovax)  * Prevnar = PCV13  * Pneumovax = PPSV23 Adults 25-60 years old: 1-3 doses may be recommended based on certain risk factors  Adults 72 years old: Prevnar (PCV13) vaccine recommended followed by Pneumovax (PPSV23) vaccine  If already received PPSV23 since turning 65, then PCV13 recommended at least one year after PPSV23 dose  Hepatitis B Vaccine 3 dose series if at intermediate or high risk (ex: diabetes, end stage renal disease, liver disease)   Tetanus (Td) Vaccine - COST NOT COVERED BY MEDICARE PART B Following completion of primary series, a booster dose should be given every 10 years to maintain immunity against tetanus  Td may also be given as tetanus wound prophylaxis  Tdap Vaccine - COST NOT COVERED BY MEDICARE PART B Recommended at least once for all adults  For pregnant patients, recommended with each pregnancy  Shingles Vaccine (Shingrix) - COST NOT COVERED BY MEDICARE PART B  2 shot series recommended in those aged 48 and above     Health Maintenance Due:      Topic Date Due    Hepatitis C Screening  Never done    HIV Screening  Never done    Colorectal Cancer Screening  Never done     Immunizations Due:      Topic Date Due    COVID-19 Vaccine (1) Never done     Advance Directives   What are advance directives? Advance directives are legal documents that state your wishes and plans for medical care  These plans are made ahead of time in case you lose your ability to make decisions for yourself  Advance directives can apply to any medical decision, such as the treatments you want, and if you want to donate organs  What are the types of advance directives? There are many types of advance directives, and each state has rules about how to use them  You may choose a combination of any of the following:  · Living will: This is a written record of the treatment you want  You can also choose which treatments you do not want, which to limit, and which to stop at a certain time  This includes surgery, medicine, IV fluid, and tube feedings  · Durable power of  for healthcare [facility]):   This is a written record that states who you want to make healthcare choices for you when you are unable to make them for yourself  This person, called a proxy, is usually a family member or a friend  You may choose more than 1 proxy  · Do not resuscitate (DNR) order:  A DNR order is used in case your heart stops beating or you stop breathing  It is a request not to have certain forms of treatment, such as CPR  A DNR order may be included in other types of advance directives  · Medical directive: This covers the care that you want if you are in a coma, near death, or unable to make decisions for yourself  You can list the treatments you want for each condition  Treatment may include pain medicine, surgery, blood transfusions, dialysis, IV or tube feedings, and a ventilator (breathing machine)  · Values history: This document has questions about your views, beliefs, and how you feel and think about life  This information can help others choose the care that you would choose  Why are advance directives important? An advance directive helps you control your care  Although spoken wishes may be used, it is better to have your wishes written down  Spoken wishes can be misunderstood, or not followed  Treatments may be given even if you do not want them  An advance directive may make it easier for your family to make difficult choices about your care  Cigarette Smoking and Your Health   Risks to your health if you smoke:  Nicotine and other chemicals found in tobacco damage every cell in your body  Even if you are a light smoker, you have an increased risk for cancer, heart disease, and lung disease  If you are pregnant or have diabetes, smoking increases your risk for complications  Benefits to your health if you stop smoking:   · You decrease respiratory symptoms such as coughing, wheezing, and shortness of breath  · You reduce your risk for cancers of the lung, mouth, throat, kidney, bladder, pancreas, stomach, and cervix   If you already have cancer, you increase the benefits of chemotherapy  You also reduce your risk for cancer returning or a second cancer from developing  · You reduce your risk for heart disease, blood clots, heart attack, and stroke  · You reduce your risk for lung infections, and diseases such as pneumonia, asthma, chronic bronchitis, and emphysema  · Your circulation improves  More oxygen can be delivered to your body  If you have diabetes, you lower your risk for complications, such as kidney, artery, and eye diseases  You also lower your risk for nerve damage  Nerve damage can lead to amputations, poor vision, and blindness  · You improve your body's ability to heal and to fight infections  For more information and support to stop smoking:   · Smokefree  gov  Phone: 0- 657 - 809-7875  Web Address: www [employer]  [facility] [place] 2018 Information is for End User's use only and may not be sold, redistributed or otherwise used for commercial purposes   All illustrations and images included in CareNotes® are the copyrighted property of A D A M , Inc  or [address] [place] [address]

## 2021-04-20 NOTE — PROGRESS NOTES
Assessment and Plan:     Problem List Items Addressed This Visit        Endocrine    Type 2 diabetes mellitus with hyperglycemia, with long-term current use of insulin ([facility] [address] ) - Primary       Cardiovascular and Mediastinum    Essential hypertension    Relevant Medications    carvedilol (COREG) 6 25 mg tablet    amLODIPine (NORVASC) 10 mg tablet    Cardiomyopathy (HCC)    Relevant Medications    carvedilol (COREG) 6 25 mg tablet    amLODIPine (NORVASC) 10 mg tablet    Coronary artery disease involving native coronary artery of native heart with angina pectoris (HCC)    Relevant Medications    carvedilol (COREG) 6 25 mg tablet    amLODIPine (NORVASC) 10 mg tablet       Other    Dyslipidemia    Relevant Orders    Lipid Panel with Direct LDL reflex    Tobacco abuse    Medicare annual wellness visit, subsequent      Other Visit Diagnoses     Screen for colon cancer        Relevant Orders    Cologuard    Screening for prostate cancer        Relevant Orders    PSA, Total Screen    Encounter for screening for malignant neoplasm of lung in current smoker with 30 pack year history or greater        Relevant Orders    CT lung screening program    Neck pain        Relevant Orders    XR spine cervical complete 4 or 5 vw non injury    Ambulatory referral to Physical Therapy    Chronic left shoulder pain        Relevant Orders    Ambulatory referral to Physical Therapy    Left arm numbness        Relevant Orders    XR spine cervical complete 4 or 5 vw non injury    Ambulatory referral to Physical Therapy           Preventive health issues were discussed with patient, and age appropriate screening tests were ordered as noted in patient's After Visit Summary  Personalized health advice and appropriate referrals for health education or preventive services given if needed, as noted in patient's After Visit Summary  History of Present Illness:     Patient presents for Welcome to Medicare visit       Patient Care Team:  Wilma Larios ROSENDO as PCP - General (Family Medicine)     Review of Systems:     Review of Systems   Problem List:     Patient Active Problem List   Diagnosis    Hyperosmolar non-ketotic state in patient with type 2 diabetes mellitus ([place] [address] )    JOELLE (acute kidney injury) ([place] [address] )    Essential hypertension    Hyperosmolar hyponatremia    Dyslipidemia    Abnormal transaminases    Leukocytosis    Chest pain    Type 2 diabetes mellitus with hyperglycemia, with long-term current use of insulin ([facility])    Tobacco abuse    Cardiomyopathy ([place] [address] )    Coronary artery disease involving native coronary artery of native heart with angina pectoris ([place] [address] )    Generalized anxiety disorder    Medicare annual wellness visit, subsequent      Past Medical and Surgical History:     Past Medical History:   Diagnosis Date    Diabetes mellitus ([place] [address] )     Hyperlipidemia     Hypertension      Past Surgical History:   Procedure Laterality Date    COLONOSCOPY  2010    KNEE SURGERY Right       Family History:     Family History   Problem Relation Age of Onset    Diabetes Mother     Hyperlipidemia Mother     Dementia Mother     Hyperlipidemia Father       Social History:        Social History     Socioeconomic History    Marital status: /Civil Union     Spouse name: Not on file    Number of children: Not on file    Years of education: Not on file    Highest education level: Not on file   Occupational History    Not on file   Social Needs    Financial resource strain: Not on file    Food insecurity     Worry: Not on file     Inability: Not on file   [employer] needs     Medical: Not on file     Non-medical: Not on file   Tobacco Use    Smoking status: Current Some Day Smoker     Packs/day: 0 25     Years: 40 00     Pack years: 10 00     Types: Cigarettes    Smokeless tobacco: Never Used   Substance and Sexual Activity    Alcohol use: Never     Frequency: Never     Comment: 1 mixed drinks/day    Drug use: Never    Sexual activity: Never   Lifestyle    Physical activity     Days per week: Not on file     Minutes per session: Not on file    Stress: Not on file   Relationships    Social connections     Talks on phone: Not on file     Gets together: Not on file     Attends Zoroastrianism service: Not on file     Active member of club or organization: Not on file     Attends meetings of clubs or organizations: Not on file     Relationship status: Not on file    Intimate partner violence     Fear of current or ex partner: Not on file     Emotionally abused: Not on file     Physically abused: Not on file     Forced sexual activity: Not on file   Other Topics Concern    Not on file   Social History Narrative    Not on file      Medications and Allergies:     Current Outpatient Medications   Medication Sig Dispense Refill    amLODIPine (NORVASC) 10 mg tablet Take 1 tablet (10 mg total) by mouth daily 90 tablet 1    aspirin 81 mg chewable tablet Chew 1 tablet (81 mg total) daily 20 tablet 0    atorvastatin (LIPITOR) 40 mg tablet TAKE ONE TABLET BY MOUTH EVERY DAY WITH DINNER 30 tablet 2    busPIRone (BUSPAR) 10 mg tablet TAKE 1 TABLET BY MOUTH TWICE DAILY 60 tablet 5    carvedilol (COREG) 6 25 mg tablet Take 1 tablet (6 25 mg total) by mouth 2 (two) times a day with meals 60 tablet 0    glimepiride (AMARYL) 2 mg tablet TAKE ONE TABLET BY MOUTH EVERY DAY WITH BREAKFAST  30 tablet 0    hydrochlorothiazide (HYDRODIURIL) 12 5 mg tablet TAKE ONE TABLET BY MOUTH EVERY DAY 90 tablet 1    lisinopril (ZESTRIL) 40 mg tablet Take 1 tablet (40 mg total) by mouth daily 90 tablet 2    metFORMIN (GLUCOPHAGE) 500 mg tablet Take 1 tablet (500 mg total) by mouth daily with breakfast 90 tablet 2    Multiple Vitamins-Minerals (MULTIVITAMIN WITH MINERALS) tablet Take 1 tablet by mouth daily       No current facility-administered medications for this visit        No Known Allergies   Immunizations:     Immunization History   Administered Date(s) Administered    Hep B, Adolescent or Pediatric 08/11/2008    Hep B, adult 10/13/2008    Influenza, recombinant, quadrivalent,injectable, preservative free 12/05/2019, 08/31/2020    Pneumococcal Polysaccharide PPV23 12/08/2019    Tdap 08/11/2008      Health Maintenance:         Topic Date Due    Hepatitis C Screening  Never done    HIV Screening  Never done    Colorectal Cancer Screening  Never done         Topic Date Due    COVID-19 Vaccine (1) Never done      Medicare Screening Tests and Risk Assessments:     Josseline Freddie is here for his Welcome to Medicare visit  Health Risk Assessment:   Patient rates overall health as good  Patient feels that their physical health rating is same  Patient is satisfied with their life  Eyesight was rated as same  Hearing was rated as same  Patient feels that their emotional and mental health rating is same  Patients states they are never, rarely angry  Patient states they are never, rarely unusually tired/fatigued  Pain experienced in the last 7 days has been none  Patient states that he has experienced no weight loss or gain in last 6 months  Depression Screening:   PHQ-2 Score: 0      Fall Risk Screening: In the past year, patient has experienced: no history of falling in past year      Home Safety:  Patient does not have trouble with stairs inside or outside of their home  Patient has working smoke alarms and has working carbon monoxide detector  Home safety hazards include: none  Nutrition:   Current diet is Diabetic and Regular  Medications:   Patient is not currently taking any over-the-counter supplements  Patient is able to manage medications  Activities of Daily Living (ADLs)/Instrumental Activities of Daily Living (IADLs):   Walk and transfer into and out of bed and chair?: Yes  Dress and groom yourself?: Yes    Bathe or shower yourself?: Yes    Feed yourself?  Yes  Do your laundry/housekeeping?: Yes  Manage your money, pay your bills and track your expenses?: Yes  Make your own meals?: Yes    Do your own shopping?: Yes    Previous Hospitalizations:   Any hospitalizations or ED visits within the last 12 months?: No      Advance Care Planning:   Living will: No    Durable POA for healthcare: No    Advanced directive: No    Advanced directive counseling given: Yes    Five wishes given: Yes      PREVENTIVE SCREENINGS      Cardiovascular Screening:    General: Screening Not Indicated and History Lipid Disorder      Diabetes Screening:     General: Screening Not Indicated and History Diabetes      Colorectal Cancer Screening:     General: Risks and Benefits Discussed    Due for: Cologuard      Prostate Cancer Screening:    General: Risks and Benefits Discussed    Due for: PSA      Osteoporosis Screening:    General: Risks and Benefits Discussed and Screening Not Indicated      Abdominal Aortic Aneurysm (AAA) Screening:    Risk factors include: age between 73-69 yo and tobacco use        General: Screening Not Indicated      Lung Cancer Screening:     General: Screening Not Indicated and Risks and Benefits Discussed    Due for: Low Dose CT (LDCT)    Screening, Brief Intervention, and Referral to Treatment (SBIRT)    Screening      Single Item Drug Screening:  How often have you used an illegal drug (including marijuana) or a prescription medication for non-medical reasons in the past year? never    Single Item Drug Screen Score: 0  Interpretation: Negative screen for possible drug use disorder    No exam data present     Physical Exam:     /82 (BP Location: Left arm, Patient Position: Sitting)   Resp 18   Ht 5' 7" (1 702 m)   Wt 68 5 kg (151 lb)   SpO2 97%   BMI 23 65 kg/m²         Deya Garcia PA-C

## 2021-04-20 NOTE — PROGRESS NOTES
Assessment/Plan:       Problem List Items Addressed This Visit        Endocrine    Type 2 diabetes mellitus with hyperglycemia, with long-term current use of insulin (HCC) - Primary       Cardiovascular and Mediastinum    Essential hypertension    Relevant Medications    carvedilol (COREG) 6 25 mg tablet    amLODIPine (NORVASC) 10 mg tablet    Cardiomyopathy (HCC)    Relevant Medications    carvedilol (COREG) 6 25 mg tablet    amLODIPine (NORVASC) 10 mg tablet    Coronary artery disease involving native coronary artery of native heart with angina pectoris (HCC)    Relevant Medications    carvedilol (COREG) 6 25 mg tablet    amLODIPine (NORVASC) 10 mg tablet       Other    Dyslipidemia    Relevant Orders    Lipid Panel with Direct LDL reflex    Tobacco abuse    Medicare annual wellness visit, subsequent      Other Visit Diagnoses     Screen for colon cancer        Relevant Orders    Cologuard    Screening for prostate cancer        Relevant Orders    PSA, Total Screen    Encounter for screening for malignant neoplasm of lung in current smoker with 30 pack year history or greater        Relevant Orders    CT lung screening program    Neck pain        Relevant Orders    XR spine cervical complete 4 or 5 vw non injury    Ambulatory referral to Physical Therapy    Chronic left shoulder pain        Relevant Orders    Ambulatory referral to Physical Therapy    Left arm numbness        Relevant Orders    XR spine cervical complete 4 or 5 vw non injury    Ambulatory referral to Physical Therapy        DM at goal, continue current regimen  HTN not at goal, increase coreg to 6 25mg BID, check BP BID follow up in 2 weeks for recheck  Update labs  No cardiac complaints  PT for neck/shoulder pain with L arm numbness  Update CR C spine  Update      Subjective:      Patient ID: Laura Jimenez is a 72 y o  male      Pt presents for 3 month follow up    DM: on glimeperide and metformin, previously on insulin, pt had drastic lifestyle changes which controlled his DM  a1c today is 5 7  HTN: above goal today, 160/82, similar at recent cardiology visit  He is on amlodipine 10mg, lisinopril 40mg, hctz 12 5, coreg 3 125mg BID  His BP at home is normally in the 140s/70s  Denies CP, SOB, edema, palpitations, excess fatigue  HLD on statin, no recent labs, is due for recheck  CAD/cardiomyopathy, meds as above, follows with cardiology, recent ECHO shows possible hypokinesis vs LBBB of septal wall, EF 50-55%  Stable from previous study  No cardiac complaints  Continues to smoke, has been smoking for 35 years  He also notes he has some chronic but worsened neck stiffness/shoulder pain, when he lays on his neck/shoulder or drives too long with his left arm he feels his left arm go numb  No recent injuries  The following portions of the patient's history were reviewed and updated as appropriate:   He  has a past medical history of Diabetes mellitus ([facility] ), Hyperlipidemia, and Hypertension  He   Patient Active Problem List    Diagnosis Date Noted    Medicare annual wellness visit, subsequent 04/20/2021    Coronary artery disease involving native coronary artery of native heart with angina pectoris ([facility] ) 05/28/2020    Generalized anxiety disorder 05/28/2020    Cardiomyopathy ([facility] ) 05/11/2020    Tobacco abuse 05/10/2020    Chest pain 05/08/2020    Type 2 diabetes mellitus with hyperglycemia, with long-term current use of insulin ([facility] ) 05/08/2020    Hyperosmolar non-ketotic state in patient with type 2 diabetes mellitus ([facility] ) 12/05/2019    JOELLE (acute kidney injury) ([facility] ) 12/05/2019    Essential hypertension 12/05/2019    Hyperosmolar hyponatremia 12/05/2019    Dyslipidemia 12/05/2019    Abnormal transaminases 12/05/2019    Leukocytosis 12/05/2019     He  has a past surgical history that includes Knee surgery (Right) and Colonoscopy (2010)    His family history includes Dementia in his mother; Diabetes in his mother; Hyperlipidemia in his father and mother  He  reports that he has been smoking cigarettes  He has a 10 00 pack-year smoking history  He has never used smokeless tobacco  He reports that he does not drink alcohol or use drugs  Current Outpatient Medications   Medication Sig Dispense Refill    amLODIPine (NORVASC) 10 mg tablet Take 1 tablet (10 mg total) by mouth daily 90 tablet 1    aspirin 81 mg chewable tablet Chew 1 tablet (81 mg total) daily 20 tablet 0    atorvastatin (LIPITOR) 40 mg tablet TAKE ONE TABLET BY MOUTH EVERY DAY WITH DINNER 30 tablet 2    busPIRone (BUSPAR) 10 mg tablet TAKE 1 TABLET BY MOUTH TWICE DAILY 60 tablet 5    carvedilol (COREG) 6 25 mg tablet Take 1 tablet (6 25 mg total) by mouth 2 (two) times a day with meals 60 tablet 0    glimepiride (AMARYL) 2 mg tablet TAKE ONE TABLET BY MOUTH EVERY DAY WITH BREAKFAST  30 tablet 0    hydrochlorothiazide (HYDRODIURIL) 12 5 mg tablet TAKE ONE TABLET BY MOUTH EVERY DAY 90 tablet 1    lisinopril (ZESTRIL) 40 mg tablet Take 1 tablet (40 mg total) by mouth daily 90 tablet 2    metFORMIN (GLUCOPHAGE) 500 mg tablet Take 1 tablet (500 mg total) by mouth daily with breakfast 90 tablet 2    Multiple Vitamins-Minerals (MULTIVITAMIN WITH MINERALS) tablet Take 1 tablet by mouth daily       No current facility-administered medications for this visit  Current Outpatient Medications on File Prior to Visit   Medication Sig    aspirin 81 mg chewable tablet Chew 1 tablet (81 mg total) daily    atorvastatin (LIPITOR) 40 mg tablet TAKE ONE TABLET BY MOUTH EVERY DAY WITH DINNER    busPIRone (BUSPAR) 10 mg tablet TAKE 1 TABLET BY MOUTH TWICE DAILY    glimepiride (AMARYL) 2 mg tablet TAKE ONE TABLET BY MOUTH EVERY DAY WITH BREAKFAST      hydrochlorothiazide (HYDRODIURIL) 12 5 mg tablet TAKE ONE TABLET BY MOUTH EVERY DAY    lisinopril (ZESTRIL) 40 mg tablet Take 1 tablet (40 mg total) by mouth daily    metFORMIN (GLUCOPHAGE) 500 mg tablet Take 1 tablet (500 mg total) by mouth daily with breakfast    Multiple Vitamins-Minerals (MULTIVITAMIN WITH MINERALS) tablet Take 1 tablet by mouth daily    [DISCONTINUED] amLODIPine (NORVASC) 10 mg tablet TAKE ONE TABLET BY MOUTH EVERY DAY    [DISCONTINUED] carvedilol (COREG) 6 25 mg tablet TAKE 1/2 A TABLET BY MOUTH TWO TIMES A DAY WITH MEALS     No current facility-administered medications on file prior to visit  He has No Known Allergies       Review of Systems   Constitutional: Negative for chills, fatigue and fever  HENT: Negative for congestion, ear pain, hearing loss, nosebleeds, postnasal drip, rhinorrhea, sinus pressure, sinus pain, sneezing and sore throat  Eyes: Negative for pain, discharge, itching and visual disturbance  Respiratory: Negative for cough, chest tightness, shortness of breath and wheezing  Cardiovascular: Negative for chest pain, palpitations and leg swelling  Gastrointestinal: Negative for abdominal pain, blood in stool, constipation, diarrhea, nausea and vomiting  Genitourinary: Negative for frequency and urgency  Musculoskeletal: Positive for arthralgias, neck pain and neck stiffness  Skin: Negative  Neurological: Positive for numbness  Negative for dizziness, weakness and light-headedness  Psychiatric/Behavioral: Negative  Objective:      /82 (BP Location: Left arm, Patient Position: Sitting)   Resp 18   Ht 5' 7" (1 702 m)   Wt 68 5 kg (151 lb)   SpO2 97%   BMI 23 65 kg/m²          Physical Exam  Vitals signs and nursing note reviewed  Constitutional:       General: He is not in acute distress  Appearance: Normal appearance  HENT:      Head: Normocephalic and atraumatic  Nose: Nose normal       Mouth/Throat:      Mouth: Mucous membranes are moist       Pharynx: Oropharynx is clear  No oropharyngeal exudate or posterior oropharyngeal erythema  Eyes:      Pupils: Pupils are equal, round, and reactive to light     Neck:      Musculoskeletal: Normal range of motion and neck supple  Cardiovascular:      Rate and Rhythm: Normal rate and regular rhythm  Heart sounds: Murmur present  Pulmonary:      Effort: Pulmonary effort is normal  No respiratory distress  Breath sounds: Normal breath sounds  No wheezing, rhonchi or rales  Abdominal:      General: Bowel sounds are normal  There is no distension  Palpations: Abdomen is soft  There is no mass  Tenderness: There is no abdominal tenderness  There is no guarding  Musculoskeletal: Normal range of motion  General: No tenderness  Right lower leg: No edema  Left lower leg: No edema  Skin:     General: Skin is warm and dry  Neurological:      Mental Status: He is alert and oriented to person, place, and time     Psychiatric:         Mood and Affect: Mood and affect normal

## 2021-04-22 DIAGNOSIS — I10 ESSENTIAL HYPERTENSION: ICD-10-CM

## 2021-04-22 RX ORDER — AMLODIPINE BESYLATE 10 MG/1
TABLET ORAL
Qty: 30 TABLET | Refills: 5 | Status: SHIPPED | OUTPATIENT
Start: 2021-04-22 | End: 2021-05-20

## 2021-04-28 ENCOUNTER — TELEPHONE (OUTPATIENT)
Dept: FAMILY MEDICINE CLINIC | Facility: CLINIC | Age: 66
End: 2021-04-28

## 2021-04-28 NOTE — TELEPHONE ENCOUNTER
Pt called, blood pressure was 162/88 yesterday afternoon  He took Isosorbide last night, this morning BP was 143/80  He has not been taking the Isosorbide regularly, it was prescribed by his cardiologist last year  He will continue monitoring BP throughout the day  He will call the office if it spikes again  Advised ER if any chest pain/SOB  Patient questioning if he should continue taking the Isosorbide

## 2021-04-28 NOTE — TELEPHONE ENCOUNTER
Hold off for now on restarting isosorbide, continue to monitor BP and follow up if spikes again before appt

## 2021-04-30 ENCOUNTER — APPOINTMENT (OUTPATIENT)
Dept: LAB | Facility: CLINIC | Age: 66
End: 2021-04-30
Payer: MEDICARE

## 2021-04-30 DIAGNOSIS — E78.2 MIXED HYPERLIPIDEMIA: ICD-10-CM

## 2021-04-30 DIAGNOSIS — I25.10 ATHEROSCLEROSIS OF NATIVE CORONARY ARTERY OF NATIVE HEART WITHOUT ANGINA PECTORIS: ICD-10-CM

## 2021-04-30 DIAGNOSIS — E11.9 DIABETES MELLITUS TYPE 2 IN NONOBESE (HCC): ICD-10-CM

## 2021-04-30 DIAGNOSIS — E78.5 DYSLIPIDEMIA: ICD-10-CM

## 2021-04-30 DIAGNOSIS — I10 ESSENTIAL HYPERTENSION: ICD-10-CM

## 2021-04-30 DIAGNOSIS — E11.9 DIABETES MELLITUS WITHOUT COMPLICATION (HCC): ICD-10-CM

## 2021-04-30 DIAGNOSIS — Z12.5 SCREENING FOR PROSTATE CANCER: ICD-10-CM

## 2021-04-30 LAB
CHOLEST SERPL-MCNC: 124 MG/DL (ref 50–200)
CREAT UR-MCNC: 27.2 MG/DL
HDLC SERPL-MCNC: 55 MG/DL
LDLC SERPL CALC-MCNC: 47 MG/DL (ref 0–100)
MICROALBUMIN UR-MCNC: 13.8 MG/L (ref 0–20)
MICROALBUMIN/CREAT 24H UR: 51 MG/G CREATININE (ref 0–30)
PSA SERPL-MCNC: 1.4 NG/ML (ref 0–4)
TRIGL SERPL-MCNC: 110 MG/DL

## 2021-04-30 PROCEDURE — 82043 UR ALBUMIN QUANTITATIVE: CPT | Performed by: PHYSICIAN ASSISTANT

## 2021-04-30 PROCEDURE — 82570 ASSAY OF URINE CREATININE: CPT | Performed by: PHYSICIAN ASSISTANT

## 2021-04-30 PROCEDURE — G0103 PSA SCREENING: HCPCS

## 2021-04-30 PROCEDURE — 80061 LIPID PANEL: CPT

## 2021-05-04 ENCOUNTER — OFFICE VISIT (OUTPATIENT)
Dept: FAMILY MEDICINE CLINIC | Facility: CLINIC | Age: 66
End: 2021-05-04
Payer: MEDICARE

## 2021-05-04 ENCOUNTER — TELEPHONE (OUTPATIENT)
Dept: CARDIOLOGY CLINIC | Facility: CLINIC | Age: 66
End: 2021-05-04

## 2021-05-04 VITALS
DIASTOLIC BLOOD PRESSURE: 90 MMHG | HEART RATE: 72 BPM | HEIGHT: 67 IN | OXYGEN SATURATION: 98 % | RESPIRATION RATE: 18 BRPM | SYSTOLIC BLOOD PRESSURE: 178 MMHG | TEMPERATURE: 98.1 F | BODY MASS INDEX: 23.86 KG/M2 | WEIGHT: 152 LBS

## 2021-05-04 DIAGNOSIS — R01.1 CARDIAC MURMUR: ICD-10-CM

## 2021-05-04 DIAGNOSIS — I10 RESISTANT HYPERTENSION: Primary | ICD-10-CM

## 2021-05-04 DIAGNOSIS — R39.9 UTI SYMPTOMS: ICD-10-CM

## 2021-05-04 PROCEDURE — 93000 ELECTROCARDIOGRAM COMPLETE: CPT | Performed by: PHYSICIAN ASSISTANT

## 2021-05-04 PROCEDURE — 99214 OFFICE O/P EST MOD 30 MIN: CPT | Performed by: PHYSICIAN ASSISTANT

## 2021-05-04 RX ORDER — CHLORTHALIDONE 25 MG/1
25 TABLET ORAL DAILY
Qty: 30 TABLET | Refills: 0 | Status: SHIPPED | OUTPATIENT
Start: 2021-05-04 | End: 2021-06-01

## 2021-05-04 NOTE — PROGRESS NOTES
Assessment/Plan:       Problem List Items Addressed This Visit     None      Visit Diagnoses     Resistant hypertension    -  Primary    Relevant Medications    chlorthalidone 25 mg tablet    Other Relevant Orders    Basic metabolic panel    POCT ECG (Completed)    UTI symptoms        Cardiac murmur            BP markedly elevated in office today  Switch hctz 12 5mg to chlorthalidone 25mg daily  Repeat BMP in 1 week  Check BP daily and keep log, bring cuff to follow up OV in 2 weeks  Needs cardiology follow up  EKG today shows LVH, increased QRS, LBBB (also present on previous)  Subjective:      Patient ID: Jennifer Lambert is a 72 y o  male  Pt presents for 2 week BP follow up  Worsening HTN noted at previous visit  Previously controlled  Pt notes increased water intake, no other major changes  Echo on 6/20 shows "Systolic function was at the lower limits of normal  50-55%  septal wall(anteroseptal and inferoseptal wall ) appears hypokinectic likely due to LBBB (noted on previous echo  cannot rule out ischemia clinical correlation is required  Doppler parameters were consistent with abnormal left ventricular relaxation (grade 1 diastolic dysfunction)  " hx of CAD and cardiomyopathy  Currently on lisinopril 40mg, amlodipine 10mg, coreg 6 25, hctz 12 5  BP today on presentation was 178/90  Recent labs were unremarkable  He has not had recent cardiology visit  He continues to deny CP, SOB, edema, palpitations  Compliant with all medications  BP has fluctuated at home between 372A-579 systolic      The following portions of the patient's history were reviewed and updated as appropriate:   He  has a past medical history of Diabetes mellitus ([address] ), Hyperlipidemia, and Hypertension    He   Patient Active Problem List    Diagnosis Date Noted    Medicare annual wellness visit, subsequent 04/20/2021    Coronary artery disease involving native coronary artery of native heart with angina pectoris ([address] ) 05/28/2020    Generalized anxiety disorder 05/28/2020    Cardiomyopathy ([place] [id number] ) 05/11/2020    Tobacco abuse 05/10/2020    Chest pain 05/08/2020    Type 2 diabetes mellitus with hyperglycemia, with long-term current use of insulin ([place] [id number] ) 05/08/2020    Hyperosmolar non-ketotic state in patient with type 2 diabetes mellitus ([place] [id number] ) 12/05/2019    JOELLE (acute kidney injury) ([place] [address] ) 12/05/2019    Essential hypertension 12/05/2019    Hyperosmolar hyponatremia 12/05/2019    Dyslipidemia 12/05/2019    Abnormal transaminases 12/05/2019    Leukocytosis 12/05/2019     He  has a past surgical history that includes Knee surgery (Right) and Colonoscopy (2010)  His family history includes Dementia in his mother; Diabetes in his mother; Hyperlipidemia in his father and mother  He  reports that he has been smoking cigarettes  He has a 10 00 pack-year smoking history  He has never used smokeless tobacco  He reports that he does not drink alcohol or use drugs  Current Outpatient Medications   Medication Sig Dispense Refill    amLODIPine (NORVASC) 10 mg tablet TAKE ONE TABLET BY MOUTH EVERY DAY 30 tablet 5    aspirin 81 mg chewable tablet Chew 1 tablet (81 mg total) daily 20 tablet 0    atorvastatin (LIPITOR) 40 mg tablet TAKE ONE TABLET BY MOUTH EVERY DAY WITH DINNER 30 tablet 2    busPIRone (BUSPAR) 10 mg tablet TAKE 1 TABLET BY MOUTH TWICE DAILY 60 tablet 5    carvedilol (COREG) 6 25 mg tablet Take 1 tablet (6 25 mg total) by mouth 2 (two) times a day with meals 60 tablet 0    chlorthalidone 25 mg tablet Take 1 tablet (25 mg total) by mouth daily 30 tablet 0    glimepiride (AMARYL) 2 mg tablet TAKE ONE TABLET BY MOUTH EVERY DAY WITH BREAKFAST   30 tablet 0    lisinopril (ZESTRIL) 40 mg tablet Take 1 tablet (40 mg total) by mouth daily 90 tablet 2    metFORMIN (GLUCOPHAGE) 500 mg tablet Take 1 tablet (500 mg total) by mouth daily with breakfast 90 tablet 2    Multiple Vitamins-Minerals (MULTIVITAMIN WITH MINERALS) tablet Take 1 tablet by mouth daily       No current facility-administered medications for this visit  Current Outpatient Medications on File Prior to Visit   Medication Sig    amLODIPine (NORVASC) 10 mg tablet TAKE ONE TABLET BY MOUTH EVERY DAY    aspirin 81 mg chewable tablet Chew 1 tablet (81 mg total) daily    atorvastatin (LIPITOR) 40 mg tablet TAKE ONE TABLET BY MOUTH EVERY DAY WITH DINNER    busPIRone (BUSPAR) 10 mg tablet TAKE 1 TABLET BY MOUTH TWICE DAILY    carvedilol (COREG) 6 25 mg tablet Take 1 tablet (6 25 mg total) by mouth 2 (two) times a day with meals    glimepiride (AMARYL) 2 mg tablet TAKE ONE TABLET BY MOUTH EVERY DAY WITH BREAKFAST   lisinopril (ZESTRIL) 40 mg tablet Take 1 tablet (40 mg total) by mouth daily    metFORMIN (GLUCOPHAGE) 500 mg tablet Take 1 tablet (500 mg total) by mouth daily with breakfast    Multiple Vitamins-Minerals (MULTIVITAMIN WITH MINERALS) tablet Take 1 tablet by mouth daily    [DISCONTINUED] hydrochlorothiazide (HYDRODIURIL) 12 5 mg tablet TAKE ONE TABLET BY MOUTH EVERY DAY     No current facility-administered medications on file prior to visit  He has No Known Allergies       Review of Systems   Constitutional: Negative for chills, fatigue and fever  HENT: Negative for congestion, ear pain, hearing loss, nosebleeds, postnasal drip, rhinorrhea, sinus pressure, sinus pain, sneezing and sore throat  Eyes: Negative for pain, discharge, itching and visual disturbance  Respiratory: Negative for cough, chest tightness, shortness of breath and wheezing  Cardiovascular: Negative for chest pain, palpitations and leg swelling  Gastrointestinal: Negative for abdominal pain, blood in stool, constipation, diarrhea, nausea and vomiting  Genitourinary: Negative for frequency and urgency  Neurological: Negative for dizziness, syncope, light-headedness, numbness and headaches           Objective:      BP (!) 178/90 (BP Location: Left arm, Patient Position: Sitting)   Pulse 72   Temp 98 1 °F (36 7 °C)   Resp 18   Ht 5' 7" (1 702 m)   Wt 68 9 kg (152 lb)   SpO2 98%   BMI 23 81 kg/m²          Physical Exam  Vitals signs and nursing note reviewed  Constitutional:       General: He is not in acute distress  Appearance: Normal appearance  HENT:      Head: Normocephalic and atraumatic  Nose: Nose normal    Eyes:      Pupils: Pupils are equal, round, and reactive to light  Neck:      Musculoskeletal: Normal range of motion and neck supple  Cardiovascular:      Rate and Rhythm: Normal rate and regular rhythm  Pulses: Normal pulses  Heart sounds: Murmur present  Pulmonary:      Effort: Pulmonary effort is normal  No respiratory distress  Breath sounds: Normal breath sounds  Musculoskeletal: Normal range of motion  General: No tenderness  Right lower leg: No edema  Left lower leg: No edema  Skin:     General: Skin is warm and dry  Neurological:      Mental Status: He is alert and oriented to person, place, and time     Psychiatric:         Mood and Affect: Mood and affect normal

## 2021-05-04 NOTE — TELEPHONE ENCOUNTER
I did speak to the patient  His blood pressure was running high and his antihypertensives were adjusted by primary care physician  Is hydrochlorothiazide was changed to chlorthalidone and his Coreg was titrated up    I advised patient to continue taking this medication and monitor blood pressure and bring results in the clinic   he denies any complaints and he was strongly advised to quit smoking

## 2021-05-04 NOTE — TELEPHONE ENCOUNTER
S/w patient, denied any other symptoms  Patient stated before going to PCP and before taking meds, BP was around 141/74 at home  Patient also stated that he smoked prior to having BP checked and after an increase of Carvedilol by PCP

## 2021-05-04 NOTE — TELEPHONE ENCOUNTER
Pt called and stated that he went to his primary doctor and his bp was 170/90  Pt was informed to follow up with his cardiologist  Pt denies any symptoms sob/chestpain  I scheduled pt with Dr Rolo Low for 5/26 please advise if this is ok & pt would like a call back to discuss  Pt did state that he does not get the same readings at home as the doctor does in office

## 2021-05-10 DIAGNOSIS — E11.00 HYPEROSMOLAR NON-KETOTIC STATE IN PATIENT WITH TYPE 2 DIABETES MELLITUS (HCC): ICD-10-CM

## 2021-05-10 RX ORDER — GLIMEPIRIDE 2 MG/1
TABLET ORAL
Qty: 30 TABLET | Refills: 0 | Status: SHIPPED | OUTPATIENT
Start: 2021-05-10 | End: 2021-06-15

## 2021-05-20 DIAGNOSIS — I10 ESSENTIAL HYPERTENSION: ICD-10-CM

## 2021-05-20 RX ORDER — CARVEDILOL 6.25 MG/1
TABLET ORAL
Qty: 60 TABLET | Refills: 0 | Status: SHIPPED | OUTPATIENT
Start: 2021-05-20 | End: 2021-05-24 | Stop reason: SDUPTHER

## 2021-05-20 RX ORDER — AMLODIPINE BESYLATE 10 MG/1
TABLET ORAL
Qty: 30 TABLET | Refills: 0 | Status: SHIPPED | OUTPATIENT
Start: 2021-05-20 | End: 2021-06-22

## 2021-05-24 ENCOUNTER — TELEPHONE (OUTPATIENT)
Dept: FAMILY MEDICINE CLINIC | Facility: CLINIC | Age: 66
End: 2021-05-24

## 2021-05-24 DIAGNOSIS — I10 ESSENTIAL HYPERTENSION: ICD-10-CM

## 2021-05-24 RX ORDER — CARVEDILOL 6.25 MG/1
TABLET ORAL
Qty: 60 TABLET | Refills: 0 | OUTPATIENT
Start: 2021-05-24

## 2021-05-24 RX ORDER — CARVEDILOL 6.25 MG/1
6.25 TABLET ORAL 2 TIMES DAILY WITH MEALS
Qty: 60 TABLET | Refills: 2 | Status: SHIPPED | OUTPATIENT
Start: 2021-05-24 | End: 2021-08-06 | Stop reason: SDUPTHER

## 2021-05-24 NOTE — TELEPHONE ENCOUNTER
Pharmacy called and patient stated his Coreg increased and they need a new rx send to [facility]

## 2021-05-31 DIAGNOSIS — I10 RESISTANT HYPERTENSION: ICD-10-CM

## 2021-06-01 RX ORDER — CHLORTHALIDONE 25 MG/1
TABLET ORAL
Qty: 30 TABLET | Refills: 0 | Status: SHIPPED | OUTPATIENT
Start: 2021-06-01 | End: 2021-06-29

## 2021-06-15 DIAGNOSIS — E11.00 HYPEROSMOLAR NON-KETOTIC STATE IN PATIENT WITH TYPE 2 DIABETES MELLITUS (HCC): ICD-10-CM

## 2021-06-15 RX ORDER — GLIMEPIRIDE 2 MG/1
TABLET ORAL
Qty: 30 TABLET | Refills: 0 | Status: SHIPPED | OUTPATIENT
Start: 2021-06-15 | End: 2021-07-13

## 2021-06-22 DIAGNOSIS — I10 ESSENTIAL HYPERTENSION: ICD-10-CM

## 2021-06-22 RX ORDER — AMLODIPINE BESYLATE 10 MG/1
TABLET ORAL
Qty: 30 TABLET | Refills: 0 | Status: SHIPPED | OUTPATIENT
Start: 2021-06-22 | End: 2021-07-23

## 2021-06-29 DIAGNOSIS — I10 RESISTANT HYPERTENSION: ICD-10-CM

## 2021-06-29 DIAGNOSIS — E78.5 DYSLIPIDEMIA: ICD-10-CM

## 2021-06-29 RX ORDER — ATORVASTATIN CALCIUM 40 MG/1
TABLET, FILM COATED ORAL
Qty: 30 TABLET | Refills: 2 | Status: SHIPPED | OUTPATIENT
Start: 2021-06-29 | End: 2021-09-27

## 2021-06-29 RX ORDER — CHLORTHALIDONE 25 MG/1
TABLET ORAL
Qty: 30 TABLET | Refills: 0 | Status: SHIPPED | OUTPATIENT
Start: 2021-06-29 | End: 2021-07-01

## 2021-07-01 DIAGNOSIS — I10 RESISTANT HYPERTENSION: ICD-10-CM

## 2021-07-01 RX ORDER — CHLORTHALIDONE 25 MG/1
TABLET ORAL
Qty: 30 TABLET | Refills: 0 | Status: SHIPPED | OUTPATIENT
Start: 2021-07-01 | End: 2021-08-02

## 2021-07-13 DIAGNOSIS — E11.00 HYPEROSMOLAR NON-KETOTIC STATE IN PATIENT WITH TYPE 2 DIABETES MELLITUS (HCC): ICD-10-CM

## 2021-07-13 RX ORDER — GLIMEPIRIDE 2 MG/1
TABLET ORAL
Qty: 30 TABLET | Refills: 0 | Status: SHIPPED | OUTPATIENT
Start: 2021-07-13 | End: 2021-08-16

## 2021-07-31 DIAGNOSIS — I10 RESISTANT HYPERTENSION: ICD-10-CM

## 2021-08-02 RX ORDER — CHLORTHALIDONE 25 MG/1
TABLET ORAL
Qty: 30 TABLET | Refills: 0 | Status: SHIPPED | OUTPATIENT
Start: 2021-08-02 | End: 2021-08-30

## 2021-08-06 ENCOUNTER — APPOINTMENT (OUTPATIENT)
Dept: RADIOLOGY | Facility: CLINIC | Age: 66
End: 2021-08-06
Payer: MEDICARE

## 2021-08-06 ENCOUNTER — OFFICE VISIT (OUTPATIENT)
Dept: FAMILY MEDICINE CLINIC | Facility: CLINIC | Age: 66
End: 2021-08-06
Payer: MEDICARE

## 2021-08-06 VITALS
HEART RATE: 64 BPM | WEIGHT: 152.2 LBS | HEIGHT: 67 IN | BODY MASS INDEX: 23.89 KG/M2 | SYSTOLIC BLOOD PRESSURE: 138 MMHG | OXYGEN SATURATION: 100 % | DIASTOLIC BLOOD PRESSURE: 76 MMHG

## 2021-08-06 DIAGNOSIS — G89.29 CHRONIC PAIN OF RIGHT KNEE: ICD-10-CM

## 2021-08-06 DIAGNOSIS — M17.11 ARTHRITIS OF RIGHT KNEE: ICD-10-CM

## 2021-08-06 DIAGNOSIS — I73.9 CLAUDICATION OF CALF MUSCLES (HCC): ICD-10-CM

## 2021-08-06 DIAGNOSIS — R20.2 NUMBNESS AND TINGLING IN LEFT ARM: ICD-10-CM

## 2021-08-06 DIAGNOSIS — I42.9 CARDIOMYOPATHY, UNSPECIFIED TYPE (HCC): ICD-10-CM

## 2021-08-06 DIAGNOSIS — E11.59 HYPERTENSION COMPLICATING DIABETES (HCC): ICD-10-CM

## 2021-08-06 DIAGNOSIS — R20.0 NUMBNESS AND TINGLING IN LEFT ARM: ICD-10-CM

## 2021-08-06 DIAGNOSIS — M25.561 CHRONIC PAIN OF RIGHT KNEE: ICD-10-CM

## 2021-08-06 DIAGNOSIS — I11.9 HYPERTENSIVE HEART DISEASE WITHOUT HEART FAILURE: ICD-10-CM

## 2021-08-06 DIAGNOSIS — I10 ESSENTIAL HYPERTENSION: ICD-10-CM

## 2021-08-06 DIAGNOSIS — M54.2 NECK PAIN: ICD-10-CM

## 2021-08-06 DIAGNOSIS — I25.119 CORONARY ARTERY DISEASE INVOLVING NATIVE CORONARY ARTERY OF NATIVE HEART WITH ANGINA PECTORIS (HCC): ICD-10-CM

## 2021-08-06 DIAGNOSIS — E78.5 DYSLIPIDEMIA: ICD-10-CM

## 2021-08-06 DIAGNOSIS — I15.2 HYPERTENSION COMPLICATING DIABETES (HCC): ICD-10-CM

## 2021-08-06 DIAGNOSIS — E11.9 DIABETES MELLITUS TYPE 2 IN NONOBESE (HCC): Primary | ICD-10-CM

## 2021-08-06 PROBLEM — E11.65 TYPE 2 DIABETES MELLITUS WITH HYPERGLYCEMIA, WITH LONG-TERM CURRENT USE OF INSULIN (HCC): Status: RESOLVED | Noted: 2020-05-08 | Resolved: 2021-08-06

## 2021-08-06 PROBLEM — E11.00 HYPEROSMOLAR NON-KETOTIC STATE IN PATIENT WITH TYPE 2 DIABETES MELLITUS (HCC): Status: RESOLVED | Noted: 2019-12-05 | Resolved: 2021-08-06

## 2021-08-06 PROBLEM — Z79.4 TYPE 2 DIABETES MELLITUS WITH HYPERGLYCEMIA, WITH LONG-TERM CURRENT USE OF INSULIN (HCC): Status: RESOLVED | Noted: 2020-05-08 | Resolved: 2021-08-06

## 2021-08-06 LAB
LEFT EYE DIABETIC RETINOPATHY: NORMAL
LEFT EYE IMAGE QUALITY: NORMAL
LEFT EYE MACULAR EDEMA: NORMAL
LEFT EYE OTHER RETINOPATHY: NORMAL
RIGHT EYE DIABETIC RETINOPATHY: NORMAL
RIGHT EYE IMAGE QUALITY: NORMAL
RIGHT EYE MACULAR EDEMA: NORMAL
RIGHT EYE OTHER RETINOPATHY: NORMAL
SEVERITY (EYE EXAM): NORMAL
SL AMB POCT HEMOGLOBIN AIC: 5.7 (ref ?–6.5)

## 2021-08-06 PROCEDURE — 92250 FUNDUS PHOTOGRAPHY W/I&R: CPT | Performed by: PHYSICIAN ASSISTANT

## 2021-08-06 PROCEDURE — 72050 X-RAY EXAM NECK SPINE 4/5VWS: CPT

## 2021-08-06 PROCEDURE — 99214 OFFICE O/P EST MOD 30 MIN: CPT | Performed by: PHYSICIAN ASSISTANT

## 2021-08-06 PROCEDURE — 73562 X-RAY EXAM OF KNEE 3: CPT

## 2021-08-06 PROCEDURE — 83036 HEMOGLOBIN GLYCOSYLATED A1C: CPT | Performed by: PHYSICIAN ASSISTANT

## 2021-08-06 RX ORDER — CARVEDILOL 6.25 MG/1
6.25 TABLET ORAL 2 TIMES DAILY WITH MEALS
Qty: 60 TABLET | Refills: 2 | Status: SHIPPED | OUTPATIENT
Start: 2021-08-06 | End: 2021-11-04

## 2021-08-06 RX ORDER — METHOCARBAMOL 750 MG/1
750 TABLET, FILM COATED ORAL EVERY 6 HOURS PRN
Qty: 30 TABLET | Refills: 0 | Status: SHIPPED | OUTPATIENT
Start: 2021-08-06 | End: 2021-08-30 | Stop reason: ALTCHOICE

## 2021-08-06 NOTE — PROGRESS NOTES
Assessment/Plan:       Problem List Items Addressed This Visit        Endocrine    Hypertension complicating diabetes ([facility] )    Diabetes mellitus type 2 in nonobese ([facility] ) - Primary    Relevant Orders    IRIS Diabetic eye exam    POCT hemoglobin A1c (Completed)       Cardiovascular and Mediastinum    Cardiomyopathy ([facility] )    Coronary artery disease involving native coronary artery of native heart with angina pectoris ([facility] )    Hypertensive heart disease without heart failure       Musculoskeletal and Integument    Arthritis of right knee    Relevant Orders    XR knee 3 vw right non injury    Ambulatory referral to Orthopedic Surgery       Other    Dyslipidemia    Relevant Orders    Lipid Panel with Direct LDL reflex    Claudication of calf muscles (HCC)    Relevant Orders    VAS lower limb arterial duplex, complete bilateral      Other Visit Diagnoses     Chronic pain of right knee        Relevant Orders    XR knee 3 vw right non injury    Ambulatory referral to Orthopedic Surgery    Neck pain        Relevant Medications    methocarbamol (ROBAXIN) 750 mg tablet    Other Relevant Orders    XR spine cervical complete 4 or 5 vw non injury    Numbness and tingling in left arm        Relevant Orders    XR spine cervical complete 4 or 5 vw non injury        a1c 5 7, DM well controlled, continue current regimen  Continue current BP regimen, /76 today  No cardiac complaints, continue statin, ASA, BB, ACE  Continue with cardiology  XR R knee for chronic pain, follow up with ortho  C spine XR, add robaxin for pain/stiffness  Suspect vascular claudication given sx, exam and risks, arterial duplex ordered  Continue to walk, continue ASA, statin  3 month follow ups  Update labs  CRC screening      Subjective:      Patient ID: Glen Breen is a 72 y o  male      Pt presents for 3 month follow up    DM: on metoformin and glimeperide, a1c stable at 5 7, on arb, statin  HTN: previously uncontrolled, we recently increased coreg and switched hctz to chlorthalidone, BP much better controlled, 138/76 today and shares it is normal 130s/70s at home  CAD: no CP, SOB, syncope, edema, follows with cardiology  On BB, ACE, statin, ASA  He continues to smoke  HLD: on statin    Concerns today include atrophy of the legs with pain in the calves when walking, better with rest, no known hx of vascular disease of the legs  He has chronic neck stiffness and L shoulder tightness, his L arm occasionally goes numb  No recent injuries, though does note a fall 3 years ago where he injured his neck  He has chronic R knee pain and swelling, he shares he did not previously have insurance that would cover orthopedic tx  Takes advil occasionally for pain  The following portions of the patient's history were reviewed and updated as appropriate:   He  has a past medical history of Diabetes mellitus ([facility] ), Hyperlipidemia, Hyperosmolar non-ketotic state in patient with type 2 diabetes mellitus ([facility] ) (12/5/2019), Hypertension, and Type 2 diabetes mellitus with hyperglycemia, with long-term current use of insulin ([facility] ) (5/8/2020)    He   Patient Active Problem List    Diagnosis Date Noted    Arthritis of right knee 08/06/2021    Claudication of calf muscles ([facility] ) 08/06/2021    Hypertensive heart disease without heart failure 08/06/2021    Medicare annual wellness visit, subsequent 04/20/2021    Coronary artery disease involving native coronary artery of native heart with angina pectoris ([facility] ) 05/28/2020    Generalized anxiety disorder 05/28/2020    Cardiomyopathy ([facility] ) 05/11/2020    Tobacco abuse 05/10/2020    Chest pain 05/08/2020    Diabetes mellitus type 2 in nonobese ([facility] ) 05/08/2020    JOELLE (acute kidney injury) ([facility] ) 12/05/2019    Hypertension complicating diabetes ([facility] ) 12/05/2019    Hyperosmolar hyponatremia 12/05/2019    Dyslipidemia 12/05/2019    Abnormal transaminases 12/05/2019    Leukocytosis 12/05/2019     He  has a past surgical history that includes Knee surgery (Right) and Colonoscopy (2010)  His family history includes Dementia in his mother; Diabetes in his mother; Hyperlipidemia in his father and mother  He  reports that he has been smoking cigarettes  He has a 10 00 pack-year smoking history  He has never used smokeless tobacco  He reports that he does not drink alcohol and does not use drugs  Current Outpatient Medications   Medication Sig Dispense Refill    amLODIPine (NORVASC) 10 mg tablet TAKE ONE TABLET BY MOUTH EVERY DAY 30 tablet 0    aspirin 81 mg chewable tablet Chew 1 tablet (81 mg total) daily 20 tablet 0    atorvastatin (LIPITOR) 40 mg tablet TAKE 1 TABLET BY MOUTH EVERY DAY WITH DINNER 30 tablet 2    busPIRone (BUSPAR) 10 mg tablet TAKE 1 TABLET BY MOUTH TWICE DAILY 60 tablet 5    carvedilol (COREG) 6 25 mg tablet Take 1 tablet (6 25 mg total) by mouth 2 (two) times a day with meals 60 tablet 2    chlorthalidone 25 mg tablet TAKE ONE TABLET BY MOUTH EVERY DAY 30 tablet 0    glimepiride (AMARYL) 2 mg tablet TAKE ONE TABLET BY MOUTH EVERY DAY WITH BREAKFAST  30 tablet 0    lisinopril (ZESTRIL) 40 mg tablet Take 1 tablet (40 mg total) by mouth daily 90 tablet 2    metFORMIN (GLUCOPHAGE) 500 mg tablet TAKE ONE TABLET BY MOUTH EVERY DAY WITH BREAKFAST  90 tablet 2    Multiple Vitamins-Minerals (MULTIVITAMIN WITH MINERALS) tablet Take 1 tablet by mouth daily      methocarbamol (ROBAXIN) 750 mg tablet Take 1 tablet (750 mg total) by mouth every 6 (six) hours as needed for muscle spasms 30 tablet 0     No current facility-administered medications for this visit       Current Outpatient Medications on File Prior to Visit   Medication Sig    amLODIPine (NORVASC) 10 mg tablet TAKE ONE TABLET BY MOUTH EVERY DAY    aspirin 81 mg chewable tablet Chew 1 tablet (81 mg total) daily    atorvastatin (LIPITOR) 40 mg tablet TAKE 1 TABLET BY MOUTH EVERY DAY WITH DINNER    busPIRone (BUSPAR) 10 mg tablet TAKE 1 TABLET BY MOUTH TWICE DAILY    carvedilol (COREG) 6 25 mg tablet Take 1 tablet (6 25 mg total) by mouth 2 (two) times a day with meals    chlorthalidone 25 mg tablet TAKE ONE TABLET BY MOUTH EVERY DAY    glimepiride (AMARYL) 2 mg tablet TAKE ONE TABLET BY MOUTH EVERY DAY WITH BREAKFAST   lisinopril (ZESTRIL) 40 mg tablet Take 1 tablet (40 mg total) by mouth daily    metFORMIN (GLUCOPHAGE) 500 mg tablet TAKE ONE TABLET BY MOUTH EVERY DAY WITH BREAKFAST   Multiple Vitamins-Minerals (MULTIVITAMIN WITH MINERALS) tablet Take 1 tablet by mouth daily     No current facility-administered medications on file prior to visit  He has No Known Allergies       Review of Systems   Constitutional: Negative for chills, fatigue and fever  HENT: Negative for congestion, ear pain, hearing loss, nosebleeds, postnasal drip, rhinorrhea, sinus pressure, sinus pain, sneezing and sore throat  Eyes: Negative for pain, discharge, itching and visual disturbance  Respiratory: Negative for cough, chest tightness, shortness of breath and wheezing  Cardiovascular: Negative for chest pain, palpitations and leg swelling  Gastrointestinal: Negative for abdominal pain, blood in stool, constipation, diarrhea, nausea and vomiting  Genitourinary: Negative for frequency and urgency  Musculoskeletal: Positive for arthralgias, myalgias, neck pain and neck stiffness  Skin: Negative  Neurological: Positive for numbness  Negative for dizziness and light-headedness  Objective:      /76   Pulse 64   Ht 5' 7" (1 702 m)   Wt 69 kg (152 lb 3 2 oz)   SpO2 100%   BMI 23 84 kg/m²          Physical Exam  Vitals and nursing note reviewed  Constitutional:       General: He is not in acute distress  Appearance: Normal appearance  HENT:      Head: Normocephalic and atraumatic  Nose: Nose normal    Eyes:      Pupils: Pupils are equal, round, and reactive to light     Cardiovascular:      Rate and Rhythm: Normal rate and regular rhythm  Pulses: Normal pulses  no weak pulses          Dorsalis pedis pulses are 2+ on the right side and 2+ on the left side  Posterior tibial pulses are 2+ on the right side and 2+ on the left side  Heart sounds: Murmur heard  Pulmonary:      Effort: Pulmonary effort is normal  No respiratory distress  Breath sounds: Normal breath sounds  Abdominal:      General: Bowel sounds are normal    Musculoskeletal:      Cervical back: Neck supple  Tenderness present  Pain with movement and muscular tenderness present  No spinous process tenderness  Decreased range of motion  Right knee: Swelling present  Decreased range of motion  Tenderness present  Right lower leg: No edema  Left lower leg: No edema  Comments: BLE diffuse atrophy   Feet:      Right foot:      Skin integrity: No ulcer, skin breakdown, erythema, warmth, callus or dry skin  Left foot:      Skin integrity: No ulcer, skin breakdown, erythema, warmth, callus or dry skin  Skin:     General: Skin is warm and dry  Comments: Atrophic skin of BLE with hair loss   Neurological:      Mental Status: He is alert and oriented to person, place, and time  Sensory: No sensory deficit  Motor: No weakness  Psychiatric:         Mood and Affect: Mood and affect normal            Diabetic Foot Exam    Patient's shoes and socks removed  Right Foot/Ankle   Right Foot Inspection  Skin Exam: skin normal and skin intact no dry skin, no warmth, no callus, no erythema, no maceration, no abnormal color, no pre-ulcer, no ulcer and no callus                          Toe Exam: ROM and strength within normal limits  Sensory   Vibration: intact  Proprioception: intact   Monofilament testing: intact  Vascular  Capillary refills: < 3 seconds  The right DP pulse is 2+  The right PT pulse is 2+       Left Foot/Ankle  Left Foot Inspection  Skin Exam: skin normal and skin intactno dry skin, no warmth, no erythema, no maceration, normal color, no pre-ulcer, no ulcer and no callus                         Toe Exam: ROM and strength within normal limits                   Sensory   Vibration: intact  Proprioception: intact  Monofilament: intact  Vascular  Capillary refills: < 3 seconds  The left DP pulse is 2+  The left PT pulse is 2+  Assign Risk Category:  No deformity present; No loss of protective sensation;  No weak pulses       Risk: 0

## 2021-08-09 ENCOUNTER — DOCUMENTATION (OUTPATIENT)
Dept: FAMILY MEDICINE CLINIC | Facility: CLINIC | Age: 66
End: 2021-08-09

## 2021-08-14 DIAGNOSIS — E11.00 HYPEROSMOLAR NON-KETOTIC STATE IN PATIENT WITH TYPE 2 DIABETES MELLITUS (HCC): ICD-10-CM

## 2021-08-16 RX ORDER — GLIMEPIRIDE 2 MG/1
TABLET ORAL
Qty: 30 TABLET | Refills: 0 | Status: SHIPPED | OUTPATIENT
Start: 2021-08-16 | End: 2021-09-13

## 2021-08-17 ENCOUNTER — APPOINTMENT (OUTPATIENT)
Dept: RADIOLOGY | Facility: CLINIC | Age: 66
End: 2021-08-17
Payer: MEDICARE

## 2021-08-17 ENCOUNTER — CONSULT (OUTPATIENT)
Dept: OBGYN CLINIC | Facility: CLINIC | Age: 66
End: 2021-08-17
Payer: MEDICARE

## 2021-08-17 VITALS
HEART RATE: 65 BPM | DIASTOLIC BLOOD PRESSURE: 77 MMHG | SYSTOLIC BLOOD PRESSURE: 131 MMHG | BODY MASS INDEX: 23.86 KG/M2 | HEIGHT: 67 IN | WEIGHT: 152 LBS | RESPIRATION RATE: 18 BRPM

## 2021-08-17 DIAGNOSIS — G89.29 CHRONIC PAIN OF RIGHT KNEE: ICD-10-CM

## 2021-08-17 DIAGNOSIS — M54.16 RADICULOPATHY, LUMBAR REGION: ICD-10-CM

## 2021-08-17 DIAGNOSIS — M54.12 CERVICAL RADICULITIS: ICD-10-CM

## 2021-08-17 DIAGNOSIS — M17.11 ARTHRITIS OF RIGHT KNEE: Primary | ICD-10-CM

## 2021-08-17 DIAGNOSIS — M25.561 CHRONIC PAIN OF RIGHT KNEE: ICD-10-CM

## 2021-08-17 DIAGNOSIS — M17.11 ARTHRITIS OF RIGHT KNEE: ICD-10-CM

## 2021-08-17 DIAGNOSIS — M17.11 PRIMARY OSTEOARTHRITIS OF RIGHT KNEE: ICD-10-CM

## 2021-08-17 DIAGNOSIS — M47.812 CERVICAL SPINE ARTHRITIS: ICD-10-CM

## 2021-08-17 PROCEDURE — 20610 DRAIN/INJ JOINT/BURSA W/O US: CPT | Performed by: ORTHOPAEDIC SURGERY

## 2021-08-17 PROCEDURE — 73560 X-RAY EXAM OF KNEE 1 OR 2: CPT

## 2021-08-17 PROCEDURE — 99203 OFFICE O/P NEW LOW 30 MIN: CPT | Performed by: ORTHOPAEDIC SURGERY

## 2021-08-17 PROCEDURE — 72100 X-RAY EXAM L-S SPINE 2/3 VWS: CPT

## 2021-08-17 RX ORDER — BUPIVACAINE HYDROCHLORIDE 2.5 MG/ML
2 INJECTION, SOLUTION INFILTRATION; PERINEURAL
Status: COMPLETED | OUTPATIENT
Start: 2021-08-17 | End: 2021-08-17

## 2021-08-17 RX ORDER — METHYLPREDNISOLONE ACETATE 40 MG/ML
2 INJECTION, SUSPENSION INTRA-ARTICULAR; INTRALESIONAL; INTRAMUSCULAR; SOFT TISSUE
Status: COMPLETED | OUTPATIENT
Start: 2021-08-17 | End: 2021-08-17

## 2021-08-17 RX ORDER — LIDOCAINE HYDROCHLORIDE 10 MG/ML
2 INJECTION, SOLUTION INFILTRATION; PERINEURAL
Status: COMPLETED | OUTPATIENT
Start: 2021-08-17 | End: 2021-08-17

## 2021-08-17 RX ADMIN — METHYLPREDNISOLONE ACETATE 2 ML: 40 INJECTION, SUSPENSION INTRA-ARTICULAR; INTRALESIONAL; INTRAMUSCULAR; SOFT TISSUE at 11:09

## 2021-08-17 RX ADMIN — LIDOCAINE HYDROCHLORIDE 2 ML: 10 INJECTION, SOLUTION INFILTRATION; PERINEURAL at 11:09

## 2021-08-17 RX ADMIN — BUPIVACAINE HYDROCHLORIDE 2 ML: 2.5 INJECTION, SOLUTION INFILTRATION; PERINEURAL at 11:09

## 2021-08-17 NOTE — PROGRESS NOTES
Patient Name:  Anne Noriega  MRN:  [id number]    Assessment & Plan     1  Arthritis of right knee  -     XR knee 1 or 2 vw left; Future; Expected date: 08/17/2021  -     XR knee 1 or 2 vw right; Future; Expected date: 08/17/2021  -     Ambulatory referral to Orthopedic Surgery    2  Chronic pain of right knee  -     Ambulatory referral to Orthopedic Surgery      72year old male with Right knee osteoarthritis  X-rays discussed in office today with patient  Conservative management of Right knee osteoarthritis discussed with patient including OTC oral analgesics, topical ointment/cream, physical therapy for quad, hamstring, hip abductor strengthening, ROM, modalities as indicated, corticosteroid/viscosupplementation injections, medial  brace  At this time, patient reports he has tried all conservative treatments without much improvement  Surgical management of Right knee consisting of total knee arthroplasty  In depth discussion had regarding surgical procedure, intraoperative findings, post operative rehabilitation/PT, DVT prophylaxis  Risks discussed with patient including but not limited to infection, wound healing complications secondary to diabetes, injury to surrounding structures, continued pain, stiffness, decreased ROM, need for additional procedures, fracture, conitnued lumbar radiculitis/numbness to lateral aspect of Right lower extremity, etc  In light of patients weakness of Right lower extremity, recommended physical therapy for strengthening and obtaining ROM prior any surgical intervention  Patient verbalized understanding  He would like to proceed forward today with corticosteroid injection  Risks and benefits of corticosteroid injection were discussed in detail  Risks including:  Post injection pain, elevation in blood sugar, skin discoloration, fatty atrophy, and infection were discussed in detail  The patient understood and elected to proceed forward    After sterile preparation the Right knee was aspirated removing 5 cc of synovial fluid followed by injection with 2 cc of 1% lidocaine, 2 cc of 0 25% bupivacaine, and 2 cc of Depo-Medrol  The patient tolerated the procedure and no immediate complications were noted  The patient was instructed to ice and elevate the injection site, limit strenuous activity for the next 24-48 hours, and contact us if there were any questions or concerns prior to their follow-up appointment  They were also instructed to contact their primary care physician to discuss elevated blood sugar  Patient will follow up in office in approximately 6-8 weeks for reevaluation of Right knee  Possible surgical intervention of Right total knee arthroplasty planning for December 2021  In regards to patients cervical spine, will defer treatment at this time to Spine surgery  Will place referral in chart for appointment for Dr Angel Brandt for further evaluation  Chief Complaint     Right knee pain    History of the Present Illness     Joyce Evans is a 72 y o  male with Right knee pain since an injury years ago in which he "tore everything" in his knee; s/p Right knee arthroscopy  Admits to difficulty with ambulation and mobility  He does administer OTC oral antifinlammatories as needed for pain relief  He admits to having both cortisone and viscosupplementation injections with minimal relief  He has also tried medial  brace without much relief  Patient also reports some numbness present over lateral aspect of Right thigh; denies radiation of symptoms past knee or into foot/toes  Review of Systems     Review of Systems   Constitutional: Negative for chills and fever  HENT: Negative for ear pain and sore throat  Eyes: Negative for pain and visual disturbance  Respiratory: Negative for cough and shortness of breath  Cardiovascular: Negative for chest pain and palpitations  Gastrointestinal: Negative for abdominal pain and vomiting     Genitourinary: Negative for dysuria and hematuria  Musculoskeletal: Negative for arthralgias, back pain and gait problem  Skin: Negative for color change and rash  Neurological: Negative for seizures and syncope  All other systems reviewed and are negative  Physical Exam     /77   Pulse 65   Resp 18   Ht 5' 7" (1 702 m)   Wt 68 9 kg (152 lb)   BMI 23 81 kg/m²     Right Knee: Range of motion from 0 to 110 degrees  There is mild crepitus with range of motion  There is trace effusion  There is tenderness over the medial and lateral joint line  There is +4/5 quadriceps strength and intact tone  The patient can perform a straight leg raise  Positive  patellar grind test  Anterior drawer tests is positive  Positive Lachman Test  Posterior drawer test is negative  Varus stress testing reveals no pain   Valgus stress testing reveals corrected varus deformity, no pain or laxity  Janice's testing is equivocal medially  The patient is neurovascular intact distally  Lumbar spine: There is no midline tenderness present  There is no paraspinal tenderness  Sensation intact to light touch left L2 through S1 dermatomes  Sensation intact to light touch right L2 through S1 dermatomes  5/5 strength left iliopsoas, quadriceps, tibialis anterior, extensor hallucis longus, and gastrocsoleus  +4/5 strength right iliopsoas, quadriceps, 5/5 tibialis anterior, extensor hallucis longus, and gastrocsoleus  Negative clonus bilaterally  Negative Babinski bilaterally  Straight leg raise test is negative  The patient is well perfused distally  Eyes:  Anicteric sclerae  Neck:  Supple  Lungs:  Normal respiratory effort  Cardiovascular:  Capillary refill is less than 2 seconds  Skin:  Intact without erythema  Neurologic:  Sensation grossly intact to light touch  Psychiatric:  Mood and affect are appropriate      Data Review     I have personally reviewed pertinent films in PACS, and my interpretation follows:    X-rays taken of Right knee demonstrates severe medial joint space narrowing, subchondral sclerosis, osteophyte formation  Mild to moderate patellofemoral degenerative changes noted with osteophyte formation  Past Medical History:   Diagnosis Date    Diabetes mellitus ([facility] )     Hyperlipidemia     Hyperosmolar non-ketotic state in patient with type 2 diabetes mellitus ([facility] ) 12/5/2019    Hypertension     Type 2 diabetes mellitus with hyperglycemia, with long-term current use of insulin ([place] [id number] ) 5/8/2020       Past Surgical History:   Procedure Laterality Date    COLONOSCOPY  2010    KNEE SURGERY Right        No Known Allergies    Current Outpatient Medications on File Prior to Visit   Medication Sig Dispense Refill    amLODIPine (NORVASC) 10 mg tablet TAKE ONE TABLET BY MOUTH EVERY DAY 30 tablet 0    aspirin 81 mg chewable tablet Chew 1 tablet (81 mg total) daily 20 tablet 0    atorvastatin (LIPITOR) 40 mg tablet TAKE 1 TABLET BY MOUTH EVERY DAY WITH DINNER 30 tablet 2    busPIRone (BUSPAR) 10 mg tablet TAKE 1 TABLET BY MOUTH TWICE DAILY 60 tablet 5    carvedilol (COREG) 6 25 mg tablet Take 1 tablet (6 25 mg total) by mouth 2 (two) times a day with meals 60 tablet 2    chlorthalidone 25 mg tablet TAKE ONE TABLET BY MOUTH EVERY DAY 30 tablet 0    glimepiride (AMARYL) 2 mg tablet TAKE 1 TABLET BY MOUTH DAILY WITH BREAKFAST 30 tablet 0    lisinopril (ZESTRIL) 40 mg tablet Take 1 tablet (40 mg total) by mouth daily 90 tablet 2    metFORMIN (GLUCOPHAGE) 500 mg tablet TAKE ONE TABLET BY MOUTH EVERY DAY WITH BREAKFAST  90 tablet 2    methocarbamol (ROBAXIN) 750 mg tablet Take 1 tablet (750 mg total) by mouth every 6 (six) hours as needed for muscle spasms 30 tablet 0    Multiple Vitamins-Minerals (MULTIVITAMIN WITH MINERALS) tablet Take 1 tablet by mouth daily       No current facility-administered medications on file prior to visit         Social History     Tobacco Use    Smoking status: Current Some Day Smoker     Packs/day: 0 25     Years: 40 00     Pack years: 10 00     Types: Cigarettes    Smokeless tobacco: Never Used   Vaping Use    Vaping Use: Never used   Substance Use Topics    Alcohol use: Yes     Comment: occasionally    Drug use: Never       Family History   Problem Relation Age of Onset    Diabetes Mother     Hyperlipidemia Mother     Dementia Mother     Hyperlipidemia Father              Procedures Performed     Large joint arthrocentesis: R knee  Universal Protocol:  Consent given by: patient  Patient identity confirmed: verbally with patient    Supporting Documentation  Indications: pain   Procedure Details  Location: knee - R knee  Needle size: 22 G  Approach: anterolateral  Medications administered: 2 mL bupivacaine 0 25 %; 2 mL lidocaine 1 %; 2 mL methylPREDNISolone acetate 40 mg/mL    Aspirate amount: 5 mL  Aspirate: clear and yellow    Patient tolerance: patient tolerated the procedure well with no immediate complications  Dressing:  Sterile dressing applied              Wilburn Baumgarten, PA-C

## 2021-08-18 ENCOUNTER — TELEPHONE (OUTPATIENT)
Dept: FAMILY MEDICINE CLINIC | Facility: CLINIC | Age: 66
End: 2021-08-18

## 2021-08-18 NOTE — TELEPHONE ENCOUNTER
Patient got a cortisone shot and BS is 145  But he took two metformin yesterday cause it went up to 200  Ok to monitor and take twice a day for now? Per Ju Russell yes this is ok

## 2021-08-21 DIAGNOSIS — I10 ESSENTIAL HYPERTENSION: ICD-10-CM

## 2021-08-23 DIAGNOSIS — I10 ESSENTIAL HYPERTENSION: ICD-10-CM

## 2021-08-23 RX ORDER — AMLODIPINE BESYLATE 10 MG/1
TABLET ORAL
Qty: 30 TABLET | Refills: 0 | Status: SHIPPED | OUTPATIENT
Start: 2021-08-23 | End: 2021-08-23 | Stop reason: SDUPTHER

## 2021-08-23 RX ORDER — AMLODIPINE BESYLATE 10 MG/1
10 TABLET ORAL DAILY
Qty: 30 TABLET | Refills: 0 | Status: SHIPPED | OUTPATIENT
Start: 2021-08-23 | End: 2021-09-17

## 2021-08-27 ENCOUNTER — CONSULT (OUTPATIENT)
Dept: PAIN MEDICINE | Facility: CLINIC | Age: 66
End: 2021-08-27
Payer: MEDICARE

## 2021-08-27 VITALS
HEIGHT: 67 IN | DIASTOLIC BLOOD PRESSURE: 74 MMHG | HEART RATE: 72 BPM | BODY MASS INDEX: 23.7 KG/M2 | SYSTOLIC BLOOD PRESSURE: 147 MMHG | WEIGHT: 151 LBS

## 2021-08-27 DIAGNOSIS — M47.812 FACET ARTHRITIS OF CERVICAL REGION: Primary | ICD-10-CM

## 2021-08-27 DIAGNOSIS — M50.30 DDD (DEGENERATIVE DISC DISEASE), CERVICAL: ICD-10-CM

## 2021-08-27 DIAGNOSIS — M54.12 CERVICAL RADICULOPATHY: ICD-10-CM

## 2021-08-27 PROCEDURE — 99204 OFFICE O/P NEW MOD 45 MIN: CPT | Performed by: STUDENT IN AN ORGANIZED HEALTH CARE EDUCATION/TRAINING PROGRAM

## 2021-08-27 RX ORDER — GABAPENTIN 300 MG/1
300 CAPSULE ORAL 3 TIMES DAILY
Qty: 90 CAPSULE | Refills: 1 | Status: SHIPPED | OUTPATIENT
Start: 2021-08-27 | End: 2022-04-20

## 2021-08-27 NOTE — PROGRESS NOTES
Assessment  1  Facet arthritis of cervical region    2  DDD (degenerative disc disease), cervical    3  Cervical radiculopathy        Plan    This is a 78-year-old male who presents to our office with chief complaint of predominantly axial neck pain with intermittent painful left cervical radiculopathy  He has tenderness to palpation over the bilateral facet joints and positive facet loading  He has notable facet degenerative disease in the neck which is most advanced between the C5-6 level  Discussed the etiology of degenerative disc disease and potential for foraminal narrowing and nerve impingement as result  He is neurologically intact today with intact strength and reflexes  Will order course of physical therapy to help with decompression  Discussed if no improvement, will likely need MRI of the cervical spine to assess for compressive pathology  In order to address his neck pain which is most predominant symptom, discussed cervical MBB/RFA if greater than 80% relief for medial branch blocks  Given the patient's symptoms, examination findings and imaging results noted above, I discussed the utility of proceeding with a diagnostic medial branch block at the left C4-6 levels under fluoroscopic guidance  Using an anatomical model, the procedure, as well as its potential risks, benefits, and reasonable alternatives were discussed in detail  Discussed risks of the procedure included but are not limited to bleeding, infection, allergic reaction, nerve damage, hematoma fomation, abscess formation, failure of the pain to improve and potential worsening of the pain  Since Mr Yvonne Wright has failed at least 6 weeks of conservative measures including over-the-counter pain medications, prescription medications, chiropractic therapy, physical therapy and a home exercise program it is reasonable to proceed with the above injection    The patient verbalized understanding to the potential risks, benefits, and reasonable alternatives to the above injection and wishes to proceed  His response will help to further determine a treatment plan  Lastly, for radiculopathy symptoms, will start the patient on gabapentin 300 mg t i d  on a dose titration schedule  The patient was cautioned about possible side effects of gabapentin to include sedation, swelling, mood alterations and dizziness  Patient also inquiring about medical marijuana  Has tried his son's medical marijuana in the past with notable relief  A sheet of providers in the [facility] were provided to the patient  [place] Prescription Drug Monitoring Program report was reviewed and was appropriate     My impressions and treatment recommendations were discussed in detail with the patient who verbalized understanding and had no further questions  Discharge instructions were provided  I personally saw and examined the patient and I agree with the above discussed plan of care  Orders Placed This Encounter   Procedures    Ambulatory referral to Physical Therapy     Standing Status:   Future     Standing Expiration Date:   8/27/2022     Referral Priority:   Routine     Referral Type:   Physical Therapy     Referral Reason:   Specialty Services Required     Requested Specialty:   Physical Therapy     Number of Visits Requested:   1     Expiration Date:   8/27/2022     New Medications Ordered This Visit   Medications    gabapentin (NEURONTIN) 300 mg capsule     Sig: Take 1 capsule (300 mg total) by mouth 3 (three) times a day Start by taking 1 tablet at bedtime for 3 days  Then increase to 1 tablet in the evening and 1 tablet at bedtime for 3 days  Then increase to 1 tablet in the morning, 1 tablet in the evening, and 1 tablet at bedtime thereafter       Dispense:  90 capsule     Refill:  1       History of Present Illness    Referring Provider: Aundrea Estevez PA-C    Jett Feldman is a 72 y o  male who presents with a chief complaint of NECK, RIGHT KNEE, AND LOW BACK PAIN AND   This is a chronic issue for 20 years  Pain began after an injury in 1980  Over the past month, intensity the pain has been moderate to severe  Notes that about 5 years ago he tripped over his dog and since that time has had notable neck pain  Current pain is 7/10  Pain is nearly constant  During the past month pain has been worse at all times a day with no typical pattern  Described as numbness, throbbing, dull/aching  Does not ambulate with an assistive device  Pain is increased with walking and exercising  No change with lying down, standing, bending, sitting  Worst pain is in the neck and travels down the left arm, not the right  Reports numbness tingling paresthesias  Reports it happens in all fingers but most notable in the left thumb  Feels he has lost strength in both arms  Has x-rays of the lumbar and cervical spines  Lumbar spine x-ray shows facet degenerative changes in the lower lumbar spine  Also degenerative disc disease of cervical spine with cervical facet arthritis as well  Past medical history includes diabetes, high cholesterol, hypertension  Also has history of CAD, and cardiomyopathy  Previous pain treatments include heat/ice therapy with no relief  He smokes tobacco half pack per day for 35 years  Also smokes marijuana once a day  Does not drink alcohol  On blood thinners  Not allergic to latex or contrast dye  In the past opioids have provided relief  Amita Brooks He is also currently prescribed Robaxin 750 mg q 6 hours p r n     I have personally reviewed and/or updated the patient's past medical history, past surgical history, family history, social history, current medications, allergies, and vital signs today  Review of Systems   Constitutional: Negative for fever and unexpected weight change  HENT: Negative for trouble swallowing  Eyes: Negative for visual disturbance     Respiratory: Negative for shortness of breath and wheezing  Cardiovascular: Negative for chest pain and palpitations  Gastrointestinal: Negative for constipation, diarrhea, nausea and vomiting  Endocrine: Negative for cold intolerance, heat intolerance and polydipsia  Genitourinary: Negative for difficulty urinating and frequency  Musculoskeletal: Negative for arthralgias, gait problem, joint swelling and myalgias  Skin: Negative for rash  Neurological: Negative for dizziness, seizures, syncope, weakness and headaches  Hematological: Does not bruise/bleed easily  Psychiatric/Behavioral: Negative for dysphoric mood         Patient Active Problem List   Diagnosis    JOELLE (acute kidney injury) ([place] [id number] )    Hypertension complicating diabetes ([place] [address] )    Hyperosmolar hyponatremia    Dyslipidemia    Abnormal transaminases    Leukocytosis    Chest pain    Diabetes mellitus type 2 in nonobese ([place] [address] )    Tobacco abuse    Cardiomyopathy ([place] [address] )    Coronary artery disease involving native coronary artery of native heart with angina pectoris ([place] [address] )    Generalized anxiety disorder    Medicare annual wellness visit, subsequent    Arthritis of right knee    Claudication of calf muscles ([place] [address] )    Hypertensive heart disease without heart failure       Past Medical History:   Diagnosis Date    Diabetes mellitus ([place] [id number] )     Hyperlipidemia     Hyperosmolar non-ketotic state in patient with type 2 diabetes mellitus ([place] [address] ) 12/5/2019    Hypertension     Type 2 diabetes mellitus with hyperglycemia, with long-term current use of insulin ([place] [id number] ) 5/8/2020       Past Surgical History:   Procedure Laterality Date    COLONOSCOPY  2010    KNEE SURGERY Right        Family History   Problem Relation Age of Onset    Diabetes Mother     Hyperlipidemia Mother     Dementia Mother     Hyperlipidemia Father        Social History     Occupational History    Not on file   Tobacco Use    Smoking status: Light Tobacco Smoker     Packs/day: 0 25     Years: 40 00 Pack years: 10 00     Types: Cigarettes    Smokeless tobacco: Never Used   Vaping Use    Vaping Use: Never used   Substance and Sexual Activity    Alcohol use: Yes     Alcohol/week: 0 0 standard drinks     Comment: occasionally    Drug use: Never    Sexual activity: Never       Current Outpatient Medications on File Prior to Visit   Medication Sig    amLODIPine (NORVASC) 10 mg tablet Take 1 tablet (10 mg total) by mouth daily    aspirin 81 mg chewable tablet Chew 1 tablet (81 mg total) daily    atorvastatin (LIPITOR) 40 mg tablet TAKE 1 TABLET BY MOUTH EVERY DAY WITH DINNER    busPIRone (BUSPAR) 10 mg tablet TAKE 1 TABLET BY MOUTH TWICE DAILY    carvedilol (COREG) 6 25 mg tablet Take 1 tablet (6 25 mg total) by mouth 2 (two) times a day with meals    chlorthalidone 25 mg tablet TAKE ONE TABLET BY MOUTH EVERY DAY    glimepiride (AMARYL) 2 mg tablet TAKE 1 TABLET BY MOUTH DAILY WITH BREAKFAST    lisinopril (ZESTRIL) 40 mg tablet Take 1 tablet (40 mg total) by mouth daily    metFORMIN (GLUCOPHAGE) 500 mg tablet TAKE ONE TABLET BY MOUTH EVERY DAY WITH BREAKFAST   Multiple Vitamins-Minerals (MULTIVITAMIN WITH MINERALS) tablet Take 1 tablet by mouth daily    methocarbamol (ROBAXIN) 750 mg tablet Take 1 tablet (750 mg total) by mouth every 6 (six) hours as needed for muscle spasms (Patient not taking: Reported on 8/27/2021)     No current facility-administered medications on file prior to visit  No Known Allergies    Physical Exam    /74   Pulse 72   Ht 5' 7" (1 702 m)   Wt 68 5 kg (151 lb)   BMI 23 65 kg/m²     Constitutional: normal, well developed, well nourished, alert, in no distress and non-toxic and no overt pain behavior    Eyes: anicteric  HEENT: grossly intact  Neck: supple, symmetric, trachea midline and no masses   Pulmonary:even and unlabored  Cardiovascular:No edema or pitting edema present  Skin:Normal without rashes or lesions and well hydrated  Psychiatric:Mood and affect appropriate  Neurologic:Cranial Nerves II-XII grossly intact  Musculoskeletal:normal     Cervical Spine Exam    Appearance:  Normal lordosis  Palpation/Tenderness:  left cervical paraspinal tenderness  right cervical paraspinal tenderness  Sensory:  no sensory deficits noted  Range of Motion:  Flexion:  No limitation  without pain  Extension:  Moderately limited  with pain  Lateral Flexion - Left:  No limitation  with pain  Lateral Flexion - Right:  Moderately limited  without pain  Rotation - Left:  Moderately limited  with pain  Rotation - Right:  No limitation  with pain  Motor Strength:  Left Arm Flexion  5/5  Left Arm Extension  5/5  Right Arm Flexion  5/5  Right Arm Extension  5/5  Left Wrist Flexion  5/5  Left Wrist Extension  5/5  Left Finger Abduction  5/5  Right Finger Abduction  5/5  Left Pincer Grasp  5/5  Right Pincer Grasp  5/5  Left    5/5  Right   5/5  Reflexes:  Left Biceps:  2+   Right Biceps:  2+   Left Brachioradialis:  2+   Right Brachioradialis:  2+   Left Triceps:  2+   Right Triceps:  2+   Special Tests:  Left Spurlings:  causes pain to radiates to left shoulder  Right Spurlings  negative  Cervical facet loading positive on the left      DIAGNOSTIC IMAGING AND TEST RESULTS:    LUMBAR SPINE     INDICATION:   M54 16: Radiculopathy, lumbar region      COMPARISON:  None     VIEWS:  XR SPINE LUMBAR 2 OR 3 VIEWS INJURY        FINDINGS:     Transitional lumbosacral vertebra is present  There are 4 non rib bearing lumbar vertebrae with sacralization of the L5 vertebra bilaterally       There is no evidence of acute fracture or destructive osseous lesion      Grade 1 anterolisthesis of L4 on S1  Grade 1 retrolisthesis of L2 on L3      There are endplate and facet joint degenerative changes at L3-4 and L4 S1      The pedicles appear intact      There are atherosclerotic calcifications   Soft tissues are otherwise unremarkable      IMPRESSION:     No acute osseous abnormality        Degenerative changes as described       RIGHT KNEE     INDICATION:   M17 11: Unilateral primary osteoarthritis, right knee      COMPARISON:  8/6/2021     VIEWS:  XR KNEE 1 OR 2 VW RIGHT         FINDINGS:     There is no acute fracture or dislocation      Joint effusion cannot be reliably evaluated without lateral view      Redemonstrated is severe osteoarthritis in the medial tibiofemoral compartment, evidenced by full-thickness articular cartilage loss, subchondral sclerosis, and marginal osteophytes  Varus angulation at the knee      No lytic or blastic osseous lesion      There are atherosclerotic calcifications  Soft tissues are otherwise unremarkable      IMPRESSION:     Unchanged severe right medial tibiofemoral compartment osteoarthritis with associated varus deformity      Workstation performed: [id number]    LEFT KNEE     INDICATION:   M17 11: Unilateral primary osteoarthritis, right knee  Right knee pain, left for comparison     COMPARISON:  Right knee     VIEWS:  XR KNEE 1 OR 2 VW LEFT         FINDINGS:     There is no acute fracture or dislocation      Joint effusion cannot be reliably evaluated without lateral view      Mild osteoarthritis with small osteophytes seen      No lytic or blastic osseous lesion      There are atherosclerotic calcifications  Soft tissues are otherwise unremarkable      IMPRESSION:     Mild left knee osteoarthritis without acute osseous abnormality      RIGHT KNEE     INDICATION:   M25 561: Pain in right knee  G89 29: Other chronic pain  M17 11: Unilateral primary osteoarthritis, right knee      COMPARISON:  None     VIEWS:  XR KNEE 3 VW RIGHT NON INJURY         FINDINGS:     There is no acute fracture or dislocation      There is a moderate joint effusion      There is tricompartmental osteoarthritis of the right knee with moderately severe medial compartment narrowing with varus deformity  Partial narrowing of patellofemoral joint medially   Marginal hypertrophic spurring      No lytic or blastic osseous lesion      There are atherosclerotic calcifications  Soft tissues are otherwise unremarkable      IMPRESSION:     Tricompartmental right knee osteoarthritis with joint effusion     CERVICAL SPINE     INDICATION:   M54 2: Cervicalgia  R20 0: Anesthesia of skin  R20 2: Paresthesia of skin      COMPARISON:  None     VIEWS:  XR SPINE CERVICAL COMPLETE 4 OR 5 VW NON INJURY         FINDINGS:     No fracture       There is mild kyphosis, there is no subluxation     Degenerative disc disease of the cervical spine is most advanced at C5-C6 with significant disc space narrowing and endplate spurring   Mid cervical spine facet joint arthritis present      Mild foraminal narrowing secondary to spurring present at C5-C6 particularly on the left     Bilateral soft tissue calcifications suggest carotid artery atherosclerosis       The lung apices are clear      IMPRESSION:     Degenerative disc disease and facet joint arthritis most advanced at C5-C6      There is mild kyphosis, which may be associated with muscle spasm

## 2021-08-27 NOTE — PATIENT INSTRUCTIONS
Cervical Facet Block   WHAT YOU NEED TO KNOW:   A cervical facet block is a procedure to inject medicine at the facet joints in your cervical (neck) spine  Facet joints are found at the back of each vertebrae  DISCHARGE INSTRUCTIONS:   Call your local emergency number (911 in the [address] [facility] [facility]) if:   · You have trouble breathing or chest pain  Call your doctor if:   · You have pain in your neck, shoulder, or arm that does not go away or gets worse  · Parts of your body are numb, tingly, cool to the touch, or look blue or pale  · You have a fever  · You have chills, a cough, or feel weak and achy  · Your skin is itchy, swollen, or has a rash  · You have a headache that does not go away, even after you take medicine  · You have nausea or vomiting  · You have questions or concerns about your condition or care  Medicines: You may need any of the following:  · Antibiotics  prevent or fight an infection caused by bacteria  · Prescription pain medicine  may be given  Ask your healthcare provider how to take this medicine safely  Some prescription pain medicines contain acetaminophen  Do not take other medicines that contain acetaminophen without talking to your healthcare provider  Too much acetaminophen may cause liver damage  Prescription pain medicine may cause constipation  Ask your healthcare provider how to prevent or treat constipation  · Take your medicine as directed  Contact your healthcare provider if you think your medicine is not helping or if you have side effects  Tell him or her if you are allergic to any medicine  Keep a list of the medicines, vitamins, and herbs you take  Include the amounts, and when and why you take them  Bring the list or the pill bottles to follow-up visits  Carry your medicine list with you in case of an emergency  Self-care: Your healthcare provider will tell you when you can take pain medicine after the procedure   The following can help manage pain from your medical condition or from the procedure:  · Apply ice to the injection site  Ice can help relieve pain or swelling caused by the injection  Use an ice pack, or put crushed ice in a plastic bag  Wrap a towel around the bag before you apply it to your skin  · Return to your regular activities as directed  Your provider will give you specific instructions for activity after this procedure  He or she will tell you when it is okay to drive and do other activities  · Go to physical therapy if directed  A physical therapist teaches you exercises to help improve movement and strength, and to decrease pain  · Keep a record of your pain  Write down when you have pain  Include how severe it is and if anything makes it better or worse  Bring the record to follow-up visits  Follow up with your doctor as directed:  Write down your questions so you remember to ask them during your visits  © Copyright [employer] 2021 Information is for End User's use only and may not be sold, redistributed or otherwise used for commercial purposes  All illustrations and images included in CareNotes® are the copyrighted property of A D A M [employer] Inc  or [facility] Jaquelin Kaiser   The above information is an  only  It is not intended as medical advice for individual conditions or treatments  Talk to your doctor, nurse or pharmacist before following any medical regimen to see if it is safe and effective for you

## 2021-08-29 DIAGNOSIS — I10 RESISTANT HYPERTENSION: ICD-10-CM

## 2021-08-30 ENCOUNTER — EVALUATION (OUTPATIENT)
Dept: PHYSICAL THERAPY | Facility: CLINIC | Age: 66
End: 2021-08-30
Payer: MEDICARE

## 2021-08-30 DIAGNOSIS — M17.11 PRIMARY OSTEOARTHRITIS OF RIGHT KNEE: ICD-10-CM

## 2021-08-30 DIAGNOSIS — M25.661 KNEE STIFFNESS, RIGHT: ICD-10-CM

## 2021-08-30 DIAGNOSIS — M54.2 NECK PAIN: Primary | ICD-10-CM

## 2021-08-30 DIAGNOSIS — G89.29 CHRONIC PAIN OF RIGHT KNEE: Primary | ICD-10-CM

## 2021-08-30 DIAGNOSIS — M25.561 CHRONIC PAIN OF RIGHT KNEE: Primary | ICD-10-CM

## 2021-08-30 PROCEDURE — 97162 PT EVAL MOD COMPLEX 30 MIN: CPT | Performed by: PHYSICAL THERAPIST

## 2021-08-30 PROCEDURE — 97110 THERAPEUTIC EXERCISES: CPT | Performed by: PHYSICAL THERAPIST

## 2021-08-30 PROCEDURE — 97140 MANUAL THERAPY 1/> REGIONS: CPT | Performed by: PHYSICAL THERAPIST

## 2021-08-30 RX ORDER — TIZANIDINE HYDROCHLORIDE 2 MG/1
2 CAPSULE, GELATIN COATED ORAL 3 TIMES DAILY PRN
Qty: 30 CAPSULE | Refills: 0 | Status: SHIPPED | OUTPATIENT
Start: 2021-08-30 | End: 2021-09-28 | Stop reason: ALTCHOICE

## 2021-08-30 RX ORDER — CHLORTHALIDONE 25 MG/1
TABLET ORAL
Qty: 30 TABLET | Refills: 0 | Status: SHIPPED | OUTPATIENT
Start: 2021-08-30 | End: 2021-09-17

## 2021-08-30 NOTE — PROGRESS NOTES
PT Evaluation     Today's date: 2021  Patient name: Jett Feldman  : 1955  MRN: [id number]  Referring provider: Prasanna Aleman PA-C  Dx:   Encounter Diagnosis     ICD-10-CM    1  Primary osteoarthritis of right knee  M17 11 Ambulatory referral to Physical Therapy                  Assessment  Assessment details: Jett Feldman is a 72 y o  male who presents with pain, decreased strength and decreased ROM  Due to these impairments, Patient has difficulty performing a/iadls, recreational activities and engaging in social activities  Patient has signs and symptoms consistent with their referring diagnosis of Primary osteoarthritis of right knee  Patient would benefit from skilled physical therapy to address the impairments, improve their level of function and to improve their overall quality of life  Impairments: abnormal or restricted ROM, activity intolerance, impaired physical strength, lacks appropriate home exercise program, pain with function and poor body mechanics  Barriers to therapy: Chronic right knee pain  Understanding of Dx/Px/POC: excellent  Goals  Short Term Goals: to be achieved by 4 weeks  1) Patient to be independent with basic HEP  2) Decrease pain to 4/10 at its worst   3) Increase right knee flexion ROM by 5-10 degrees   4) Increase LE strength by 1/2 MMT grade in all deficient planes      Long Term Goals: to be achieved by discharge  1) FOTO equal to or greater than 65   2) Ambulation to improve to maximal level of function  3) Stair negotiation will improve to reciprocal   4) Sit to stand transfers will improve to maximal level of function       Plan  Patient would benefit from: PT eval and skilled physical therapy  Planned modality interventions: low level laser therapy, TENS, thermotherapy: hydrocollator packs, ultrasound and cryotherapy  Planned therapy interventions: manual therapy, activity modification, behavior modification, neuromuscular re-education, therapeutic activities, therapeutic exercise, gait training, graded activity, graded exercise, patient education, home exercise program and sensory integrative techniques  Frequency: 2x week  Duration in weeks: 6  Treatment plan discussed with: patient        Subjective Evaluation    History of Present Illness  Mechanism of injury: History of Current Injury: patient reports right knee pain that started 25 years ago while playing basketball  He states that he has had constant pain since then  He hurt the knee again at 30 and had a knee scope at that time  Patient reports that he has never had physical therapy for the right knee before  Pain location/Descriptors: global knee pain with increased pain along the medial side of the knee  Aggravating factors: walking >100 yards, patient reports that he is unable to walk > 25 miles right now due to pain  Standing in one spot, the knee will become locked  This can happen after 1-2 minutes  Easing factors: narcotics  Patient reports that the injection only lasted 1-3 days at max  24 HR pattern: worse first thing in the morning, knee is 'stiffer' and will last for a significant portion of the knee  Imaging: x-ray Unchanged severe right medial tibiofemoral compartment osteoarthritis with associated varus deformity  Special Questions: patient denies any numbness, tingling or burning down the right leg, does report a slight numbness along the lateral joint line however states that this happened after his knee scope when he was 27  Patient denies headaches  Patient denies night pain and night sweats  Patient denies sustained morning stiffness  Patient denies lightheadedness  Patient denies constipation  Patient denies easy bruising  Patient denies changes in vision  Patient goals: To strengthen the knee in preparation for surgery  To feel stronger when getting out of a chair or going up and down steps     Hobbies/Interest: 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, enjoys taking care of the house and camping  Occupation: retired  Objective     Tenderness     Right Knee   Tenderness in the lateral joint line and medial joint line       Active Range of Motion     Right Knee   Flexion: 112 degrees   Extension: 0 degrees     Strength/Myotome Testing     Right Knee   Flexion: 4-  Prone flexion: 4-  Extension: 4-             Precautions: chronic right knee pain      Manuals 8/30                         Tibiofemoral mob EG                                      Neuro Re-Ed                                                                                                        Ther Ex                          SLR 3x10            bridges x30            Active hamstring set x20                                                                Ther Activity                                       Gait Training                                       Modalities

## 2021-09-02 ENCOUNTER — OFFICE VISIT (OUTPATIENT)
Dept: CARDIOLOGY CLINIC | Facility: CLINIC | Age: 66
End: 2021-09-02
Payer: MEDICARE

## 2021-09-02 VITALS
OXYGEN SATURATION: 99 % | BODY MASS INDEX: 23.96 KG/M2 | DIASTOLIC BLOOD PRESSURE: 82 MMHG | WEIGHT: 153 LBS | HEART RATE: 64 BPM | SYSTOLIC BLOOD PRESSURE: 142 MMHG

## 2021-09-02 DIAGNOSIS — E78.2 MIXED HYPERLIPIDEMIA: ICD-10-CM

## 2021-09-02 DIAGNOSIS — I44.7 LBBB (LEFT BUNDLE BRANCH BLOCK): ICD-10-CM

## 2021-09-02 DIAGNOSIS — I10 ESSENTIAL HYPERTENSION: ICD-10-CM

## 2021-09-02 DIAGNOSIS — I25.10 CORONARY ARTERY DISEASE INVOLVING NATIVE CORONARY ARTERY OF NATIVE HEART, UNSPECIFIED WHETHER ANGINA PRESENT: Primary | ICD-10-CM

## 2021-09-02 DIAGNOSIS — Z01.810 PREOPERATIVE CARDIOVASCULAR EXAMINATION: ICD-10-CM

## 2021-09-02 PROCEDURE — 99214 OFFICE O/P EST MOD 30 MIN: CPT | Performed by: INTERNAL MEDICINE

## 2021-09-02 NOTE — PROGRESS NOTES
[facility] [place] [id number] [place] [address]  Cardiology Follow Up    Vandana Goode  1955  [id number]      1  Coronary artery disease involving native coronary artery of native heart, unspecified whether angina present  NM myocardial perfusion spect (rx stress and/or rest)   2  LBBB (left bundle branch block)  NM myocardial perfusion spect (rx stress and/or rest)   3  Preoperative cardiovascular examination  NM myocardial perfusion spect (rx stress and/or rest)   4  Essential hypertension  NM myocardial perfusion spect (rx stress and/or rest)   5  Mixed hyperlipidemia  NM myocardial perfusion spect (rx stress and/or rest)       Chief Complaint   Patient presents with    Follow-up       Interval History:    70-year-old male with nonsignificant coronary artery disease(  Moderate disease in L PL status post negative IFR in May 2020), hypertension, hyperlipidemia, type 2 diabetes mellitus, smoker, left bundle-branch block presented for cardiology continuity of care     since last clinic visit patient denies any new concerns or complaints   He continues to have significant pain in right knee and will require replacement which as per patient likely will be November or December  He denies at present time any chest pain or shortness of breath at rest, palpitation, dizziness, orthopnea, leg edema or loss of consciousness   Since last clinic visit patient had echocardiogram which showed LVEF of 50-55% with hypokinesis of septal wall which could be secondary to left bundle-branch block     as per patient his home blood pressure has been stable around 120-130/70-80  he has significantly cut back on his smoking but still smokes about 3 cigarettes per day   current medications reviewed   Labs from April 2021 reviewed    Creatinine 0 92, LDL 47   AST ALT within normal limits, hemoglobin 14 5    Review of Systems:   all review of system negative except as mentioned above    Patient Active Problem List   Diagnosis    JOELLE (acute kidney injury) ([place] [address] )    Hypertension complicating diabetes ([place] [address] )    Hyperosmolar hyponatremia    Dyslipidemia    Abnormal transaminases    Leukocytosis    Chest pain    Diabetes mellitus type 2 in nonobese ([place] [address] )    Tobacco abuse    Cardiomyopathy ([place] [address] )    Coronary artery disease involving native coronary artery of native heart with angina pectoris ([place] [address] )    Generalized anxiety disorder    Medicare annual wellness visit, subsequent    Arthritis of right knee    Claudication of calf muscles ([place] [address] )    Hypertensive heart disease without heart failure     Past Medical History:   Diagnosis Date    Diabetes mellitus ([place] [address] )     Hyperlipidemia     Hyperosmolar non-ketotic state in patient with type 2 diabetes mellitus ([place] [address] ) 12/5/2019    Hypertension     Type 2 diabetes mellitus with hyperglycemia, with long-term current use of insulin ([place] [address] ) 5/8/2020     Social History     Socioeconomic History    Marital status: /Civil Union     Spouse name: Not on file    Number of children: Not on file    Years of education: Not on file    Highest education level: Not on file   Occupational History    Not on file   Tobacco Use    Smoking status: Light Tobacco Smoker     Packs/day: 0 25     Years: 40 00     Pack years: 10 00     Types: Cigarettes    Smokeless tobacco: Never Used   Vaping Use    Vaping Use: Never used   Substance and Sexual Activity    Alcohol use: Yes     Alcohol/week: 0 0 standard drinks     Comment: occasionally    Drug use: Never    Sexual activity: Never   Other Topics Concern    Not on file   Social History Narrative    Not on file     Social Determinants of Health     Financial Resource Strain:     Difficulty of Paying Living Expenses:    Food Insecurity:     Worried About Running Out of Food in the Last Year:     [address] in the Last Year:    Transportation Needs:     Lack of Transportation (Medical):      Lack of Transportation (Non-Medical):    Physical Activity:     Days of Exercise per Week:     Minutes of Exercise per Session:    Stress:     Feeling of Stress :    Social Connections:     Frequency of Communication with Friends and Family:     Frequency of Social Gatherings with Friends and Family:     Attends [employer] Services:     Active Member of Clubs or Organizations:     Attends Club or Organization Meetings:     Marital Status:    Intimate Partner Violence:     Fear of Current or Ex-Partner:     Emotionally Abused:     Physically Abused:     Sexually Abused:       Family History   Problem Relation Age of Onset    Diabetes Mother     Hyperlipidemia Mother     Dementia Mother     Hyperlipidemia Father      Past Surgical History:   Procedure Laterality Date    COLONOSCOPY  2010    KNEE SURGERY Right        Current Outpatient Medications:     amLODIPine (NORVASC) 10 mg tablet, Take 1 tablet (10 mg total) by mouth daily, Disp: 30 tablet, Rfl: 0    aspirin 81 mg chewable tablet, Chew 1 tablet (81 mg total) daily, Disp: 20 tablet, Rfl: 0    atorvastatin (LIPITOR) 40 mg tablet, TAKE 1 TABLET BY MOUTH EVERY DAY WITH DINNER, Disp: 30 tablet, Rfl: 2    busPIRone (BUSPAR) 10 mg tablet, TAKE ONE TABLET BY MOUTH TWICE A DAY, Disp: 60 tablet, Rfl: 5    carvedilol (COREG) 6 25 mg tablet, Take 1 tablet (6 25 mg total) by mouth 2 (two) times a day with meals, Disp: 60 tablet, Rfl: 2    chlorthalidone 25 mg tablet, TAKE ONE TABLET BY MOUTH EVERY DAY, Disp: 30 tablet, Rfl: 0    gabapentin (NEURONTIN) 300 mg capsule, Take 1 capsule (300 mg total) by mouth 3 (three) times a day Start by taking 1 tablet at bedtime for 3 days  Then increase to 1 tablet in the evening and 1 tablet at bedtime for 3 days    Then increase to 1 tablet in the morning, 1 tablet in the evening, and 1 tablet at bedtime thereafter , Disp: 90 capsule, Rfl: 1    glimepiride (AMARYL) 2 mg tablet, TAKE 1 TABLET BY MOUTH DAILY WITH BREAKFAST, Disp: 30 tablet, Rfl: 0    lisinopril (ZESTRIL) 40 mg tablet, Take 1 tablet (40 mg total) by mouth daily, Disp: 90 tablet, Rfl: 2    metFORMIN (GLUCOPHAGE) 500 mg tablet, TAKE ONE TABLET BY MOUTH EVERY DAY WITH BREAKFAST , Disp: 90 tablet, Rfl: 2    Multiple Vitamins-Minerals (MULTIVITAMIN WITH MINERALS) tablet, Take 1 tablet by mouth daily, Disp: , Rfl:     TiZANidine (Zanaflex) 2 MG capsule, Take 1 capsule (2 mg total) by mouth 3 (three) times a day as needed for muscle spasms (Patient not taking: Reported on 2021), Disp: 30 capsule, Rfl: 0  No Known Allergies    Labs:  Office Visit on 2021   Component Date Value    Severity 2021 NORMAL     Right Eye Diabetic Retin* 2021 None     Right Eye Macular Edema 2021 None     Right Eye Other Retinopa* 2021 None     Right Eye Image Quality 2021 Gradeable Image     Left Eye Diabetic Retino* 2021 None     Left Eye Macular Edema 2021 None     Left Eye Other Retinopat* 2021 None     Left Eye Image Quality 2021 Gradeable Image     Result 2021 Retinal Study Result for TILA CARDOZO     Result 2021 karen Jaffe 73 y/o, M (: 1955, MRN: [id number])     Result 2021 presented to [facility] on 2021 for a retinal imaging study of the left and right eyes   Result 2021 Based on the findings of the study, the following is recommended for TILA CARDOZO     Result 2021 Normal Study: Re-scan the patient in 12 months or in the next calendar year       Result 2021 Interpreting Provider's Comments:  No comments provided     Result 2021 Diagnoses Present: E119 - Type 2 diabetes mellitus without complications     Result 2021 Right eye findings: Negative for Diabetic Retinopathy     Result 2021 Negative for Macular Edema     Result 2021 Left eye findings: Negative for Diabetic Retinopathy     Result 2021 Negative for Macular Edema     Result 08/06/2021 This result was electronically signed by Romina Choi MD, NPI: [id number], Taxonomy: [id number] on 08- 03:54 UT   Result 08/06/2021 NOTE:  Any pathology noted on this diabetic retinal evaluation should be confirmed by an appropriate ophthalmic examination   Hemoglobin A1C 08/06/2021 5 7    Appointment on 04/30/2021   Component Date Value    PSA, Diagnostic 04/30/2021 1 4     Cholesterol 04/30/2021 124     Triglycerides 04/30/2021 110     HDL, Direct 04/30/2021 55     LDL Calculated 04/30/2021 47    Office Visit on 04/20/2021   Component Date Value    Hemoglobin A1C 04/20/2021 5 7     Creatinine, Ur 04/30/2021 27 2     Microalbum  ,U,Random 04/30/2021 13 8     Microalb Creat Ratio 04/30/2021 51*     Imaging: XR spine cervical complete 4 or 5 vw non injury    Result Date: 8/14/2021  Narrative: CERVICAL SPINE INDICATION:   M54 2: Cervicalgia R20 0: Anesthesia of skin R20 2: Paresthesia of skin  COMPARISON:  None VIEWS:  XR SPINE CERVICAL COMPLETE 4 OR 5 VW NON INJURY FINDINGS: No fracture  There is mild kyphosis, there is no subluxation Degenerative disc disease of the cervical spine is most advanced at C5-C6 with significant disc space narrowing and endplate spurring  Mid cervical spine facet joint arthritis present Mild foraminal narrowing secondary to spurring present at C5-C6 particularly on the left Bilateral soft tissue calcifications suggest carotid artery atherosclerosis  The lung apices are clear  Impression: Degenerative disc disease and facet joint arthritis most advanced at C5-C6  There is mild kyphosis, which may be associated with muscle spasm Workstation performed: [id number]     XR spine lumbar 2 or 3 views injury    Result Date: 8/25/2021  Narrative: LUMBAR SPINE INDICATION:   M54 16: Radiculopathy, lumbar region   COMPARISON:  None VIEWS:  XR SPINE LUMBAR 2 OR 3 VIEWS INJURY FINDINGS: Transitional lumbosacral vertebra is present  There are 4 non rib bearing lumbar vertebrae with sacralization of the L5 vertebra bilaterally  There is no evidence of acute fracture or destructive osseous lesion  Grade 1 anterolisthesis of L4 on S1  Grade 1 retrolisthesis of L2 on L3  There are endplate and facet joint degenerative changes at L3-4 and L4 S1  The pedicles appear intact  There are atherosclerotic calcifications  Soft tissues are otherwise unremarkable  Impression: No acute osseous abnormality  Degenerative changes as described  Workstation performed: [id number]     XR knee 1 or 2 vw left    Result Date: 8/25/2021  Narrative: LEFT KNEE INDICATION:   M17 11: Unilateral primary osteoarthritis, right knee  Right knee pain, left for comparison COMPARISON:  Right knee VIEWS:  XR KNEE 1 OR 2 VW LEFT FINDINGS: There is no acute fracture or dislocation  Joint effusion cannot be reliably evaluated without lateral view  Mild osteoarthritis with small osteophytes seen  No lytic or blastic osseous lesion  There are atherosclerotic calcifications  Soft tissues are otherwise unremarkable  Impression: Mild left knee osteoarthritis without acute osseous abnormality Workstation performed: [id number]     XR knee 1 or 2 vw right    Result Date: 8/25/2021  Narrative: RIGHT KNEE INDICATION:   M17 11: Unilateral primary osteoarthritis, right knee  COMPARISON:  8/6/2021 VIEWS:  XR KNEE 1 OR 2 VW RIGHT FINDINGS: There is no acute fracture or dislocation  Joint effusion cannot be reliably evaluated without lateral view  Redemonstrated is severe osteoarthritis in the medial tibiofemoral compartment, evidenced by full-thickness articular cartilage loss, subchondral sclerosis, and marginal osteophytes  Varus angulation at the knee  No lytic or blastic osseous lesion  There are atherosclerotic calcifications  Soft tissues are otherwise unremarkable       Impression: Unchanged severe right medial tibiofemoral compartment osteoarthritis with associated varus deformity  Workstation performed: [id number]     XR knee 3 vw right non injury    Result Date: 8/14/2021  Narrative: RIGHT KNEE INDICATION:   M25 561: Pain in right knee G89 29: Other chronic pain M17 11: Unilateral primary osteoarthritis, right knee  COMPARISON:  None VIEWS:  XR KNEE 3 VW RIGHT NON INJURY FINDINGS: There is no acute fracture or dislocation  There is a moderate joint effusion  There is tricompartmental osteoarthritis of the right knee with moderately severe medial compartment narrowing with varus deformity  Partial narrowing of patellofemoral joint medially  Marginal hypertrophic spurring  No lytic or blastic osseous lesion  There are atherosclerotic calcifications  Soft tissues are otherwise unremarkable  Impression: Tricompartmental right knee osteoarthritis with joint effusion Workstation performed: [id number]       Physical Exam:  General:  averagebuilt, awake, alert and oriented x3, not in distress  Neck: supple, no JVD  Eyes: PERRL, conjunctiva normal  Lungs:  Bilateral air entry positive, no wheeze/rhonchi or crackle  Heart:  S1-S2 normal, no murmur  Abdomen:  Soft ,nondistended ,nontender, bowel sounds positive  Extremities:  No leg edema, no deformity  Neuro:  Moving all extremities, speech clear  Skin: warm, no rash    /82 (BP Location: Left arm, Patient Position: Sitting, Cuff Size: Standard)   Pulse 64   Wt 69 4 kg (153 lb)   SpO2 99%   BMI 23 96 kg/m²     Cardiographics :   Echocardiogram in March 2021 showed LVEF of 50-55% with abnormal septal wall likely due to LBBB, atrial septal aneurysm, mild TR    Echocardiogram showed LVEF 74-84%, grade 1 diastolic dysfunction, paradoxical septal motion from LBBB, mildly dilated LA, possible atrial septal aneurysm, trace MR, trace TR     Cardiac catheterization on 05/11/2020 showed mid circumflex 50% stenosis, LP L1 60- 70% stenosis status post negative IFR 0 93, L PDA 30% stenosis      Assessment:    1    Coronary artery disease  Cardiac catheterization showed L PL 60-70% stenosis status post negative IFR   currently denies chest pain or shortness of breath  Exercise is limited due to right knee pain    2  Hypertension  Controlled as per patient home blood pressure is controlled  3  Hyperlipidemia  LDL at goal on statin  4  Type 2 diabetes mellitus on oral hypoglycemic was on insulin  5  Active smoker  Patient strongly advised quit smoking  6  Right knee pain and will require replacement in next 1-2 months    Recommendations:     patient had coronary artery disease with moderate disease in L PL  His functional capacity is limited due to right knee pain  He will be going for right knee replacement in next few months  Given his cardiac risk factors and coronary artery disease I would get vaso dilator MPI stress test for further evaluation     patient to continue aspirin, statin, amlodipine 10, Coreg 6 25 mg twice a day, chlorthalidone, lisinopril 40     he was advised to take low-salt, low-fat/ low-cholesterol diet   Return to clinic in 6 months or early as needed   Above all discussed with patient    Patient understands and agrees

## 2021-09-03 ENCOUNTER — OFFICE VISIT (OUTPATIENT)
Dept: PHYSICAL THERAPY | Facility: CLINIC | Age: 66
End: 2021-09-03
Payer: MEDICARE

## 2021-09-03 DIAGNOSIS — M25.661 KNEE STIFFNESS, RIGHT: ICD-10-CM

## 2021-09-03 DIAGNOSIS — M17.11 PRIMARY OSTEOARTHRITIS OF RIGHT KNEE: Primary | ICD-10-CM

## 2021-09-03 DIAGNOSIS — M25.561 CHRONIC PAIN OF RIGHT KNEE: ICD-10-CM

## 2021-09-03 DIAGNOSIS — G89.29 CHRONIC PAIN OF RIGHT KNEE: ICD-10-CM

## 2021-09-03 PROCEDURE — 97140 MANUAL THERAPY 1/> REGIONS: CPT | Performed by: PHYSICAL THERAPIST

## 2021-09-03 PROCEDURE — 97110 THERAPEUTIC EXERCISES: CPT | Performed by: PHYSICAL THERAPIST

## 2021-09-03 NOTE — PROGRESS NOTES
Daily Note     Today's date: 9/3/2021  Patient name: Mateusz Padilla  : 1955  MRN: [id number]  Referring provider: Adilene Hernandez PA-C  Dx:   Encounter Diagnosis     ICD-10-CM    1  Primary osteoarthritis of right knee  M17 11    2  Chronic pain of right knee  M25 561     G89 29    3  Knee stiffness, right  M25 661                   Subjective: George Sequeira reports that his knee is feeling better, he is moving it and his exercises are helping  Objective: See treatment diary below      Assessment: Tolerated treatment well  Patient demonstrated fatigue post treatment and would benefit from continued PT  Patient has good form with activities and is able to progress  He does reports fatigue however no increased pain  During manual therapy, patient reports significant relief  Encouraged patient to continue with HEP  Plan: Continue per plan of care  Progress treatment as tolerated         Precautions: chronic right knee pain      Manuals 8/30 9/3                        Tibiofemoral mob EG EG           Patellar mob  EG                        Neuro Re-Ed                                                                                                        Ther Ex             bike  8'           SLR 3x10 3x10           bridges x30 x30           Active hamstring set x20 x30                        PB heel slides  x30           PB HS sets  5"x20                        Ther Activity                                       Gait Training                                       Modalities

## 2021-09-07 ENCOUNTER — OFFICE VISIT (OUTPATIENT)
Dept: PHYSICAL THERAPY | Facility: CLINIC | Age: 66
End: 2021-09-07
Payer: MEDICARE

## 2021-09-07 ENCOUNTER — TELEPHONE (OUTPATIENT)
Dept: FAMILY MEDICINE CLINIC | Facility: CLINIC | Age: 66
End: 2021-09-07

## 2021-09-07 DIAGNOSIS — M17.11 PRIMARY OSTEOARTHRITIS OF RIGHT KNEE: Primary | ICD-10-CM

## 2021-09-07 DIAGNOSIS — M25.661 KNEE STIFFNESS, RIGHT: ICD-10-CM

## 2021-09-07 DIAGNOSIS — M25.561 CHRONIC PAIN OF RIGHT KNEE: ICD-10-CM

## 2021-09-07 DIAGNOSIS — G89.29 CHRONIC PAIN OF RIGHT KNEE: ICD-10-CM

## 2021-09-07 PROCEDURE — 97110 THERAPEUTIC EXERCISES: CPT | Performed by: PHYSICAL THERAPIST

## 2021-09-07 NOTE — PROGRESS NOTES
Daily Note     Today's date: 2021  Patient name: Yamile Tena  : 1955  MRN: [id number]  Referring provider: Blanco Langford PA-C  Dx:   Encounter Diagnosis     ICD-10-CM    1  Primary osteoarthritis of right knee  M17 11    2  Chronic pain of right knee  M25 561     G89 29    3  Knee stiffness, right  M25 661                   Subjective: Pt w/o any complaints to report upon arrival to tx session  Objective: See treatment diary below      Assessment: Progressed ex to standing this visit w/ good tolerance  Pt w/o c/o pain post tx session, but advised to use CP if needed  Manuals done by DPT,EG  Plan: Continue per plan of care  Progress treatment as tolerated         Precautions: chronic right knee pain      Manuals 8/30 9/3 9/7                       Tibiofemoral mob EG EG EG          Patellar mob  EG EG                       Neuro Re-Ed             TB resisted SLR flex, abd, ext   RTB 15x ea dir b/l                                                                                         Ther Ex             bike  8' 8'          SLR 3x10 3x10 3x10          bridges x30 x30 x30          Active hamstring stretch x20 x30 x30                       PB heel slides  x30 x30          PB HS sets  5"x20 1-2" x 30          HR   30x @ bar          Ther Activity                                       Gait Training                                       Modalities

## 2021-09-07 NOTE — TELEPHONE ENCOUNTER
9/7/2021 lmom Tiazadine 2mg capsule is not covered by his insurance but the tablet is covered they need to know if that is ok   Give them a call [phone]

## 2021-09-07 NOTE — PROGRESS NOTES
Daily Note     Today's date: 2021  Patient name: Daina Bazan  : 1955  MRN: [id number]  Referring provider: Yelena Carson PA-C  Dx:   Encounter Diagnosis     ICD-10-CM    1  Primary osteoarthritis of right knee  M17 11    2  Chronic pain of right knee  M25 561     G89 29    3  Knee stiffness, right  M25 661                   Subjective: ***      Objective: See treatment diary below      Assessment: Tolerated treatment {Tolerated treatment :[id number]}   Patient {assessment:[id number]}      Plan: {PLAN:1345798983}     Precautions: chronic right knee pain      Manuals  9/3                        Tibiofemoral mob EG EG           Patellar mob  EG                        Neuro Re-Ed                                                                                                        Ther Ex             bike  8'           SLR 3x10 3x10           bridges x30 x30           Active hamstring set x20 x30                        PB heel slides  x30           PB HS sets  5"x20                        Ther Activity                                       Gait Training                                       Modalities

## 2021-09-09 ENCOUNTER — OFFICE VISIT (OUTPATIENT)
Dept: PHYSICAL THERAPY | Facility: CLINIC | Age: 66
End: 2021-09-09
Payer: MEDICARE

## 2021-09-09 DIAGNOSIS — M25.661 KNEE STIFFNESS, RIGHT: ICD-10-CM

## 2021-09-09 DIAGNOSIS — M25.561 CHRONIC PAIN OF RIGHT KNEE: ICD-10-CM

## 2021-09-09 DIAGNOSIS — M17.11 PRIMARY OSTEOARTHRITIS OF RIGHT KNEE: Primary | ICD-10-CM

## 2021-09-09 DIAGNOSIS — G89.29 CHRONIC PAIN OF RIGHT KNEE: ICD-10-CM

## 2021-09-09 PROCEDURE — 97110 THERAPEUTIC EXERCISES: CPT

## 2021-09-09 PROCEDURE — 97110 THERAPEUTIC EXERCISES: CPT | Performed by: PHYSICAL THERAPIST

## 2021-09-09 PROCEDURE — 97530 THERAPEUTIC ACTIVITIES: CPT

## 2021-09-09 NOTE — PROGRESS NOTES
Daily Note     Today's date: 2021  Patient name: Asiya Hayes  : 1955  MRN: [id number]  Referring provider: Padmini Rojas PA-C  Dx:   Encounter Diagnosis     ICD-10-CM    1  Primary osteoarthritis of right knee  M17 11    2  Chronic pain of right knee  M25 561     G89 29    3  Knee stiffness, right  M25 661                   Subjective: Sidra Holley reports that his knee is feeling a little stiffer today with the weather  Overall he states that he is feeling better since starting physical therapy  Objective: See treatment diary below      Assessment: Patient demonstrated fatigue post treatment and would benefit from continued PT  This session we progressed CKC strengthening, added TRX squats and encouraged patient to focus on improving depth  Patient unable to perform heel taps secondary to increased crepetus/pain therefore proceeded with lateral step ups with better tolerance  Plan: Continue per plan of care  Progress treatment as tolerated         Precautions: chronic right knee pain      Manuals 8/30 9/3 9/7 9/9                      Tibiofemoral mob EG EG EG          Patellar mob  EG EG                       Neuro Re-Ed             TB resisted SLR flex, abd, ext   RTB 15x ea dir b/l  RTB 15x abd/ext                                                                                       Ther Ex             bike  8' 8' 8'         SLR 3x10 3x10 3x10 3x10         bridges x30 x30 x30 x30         Active hamstring stretch x20 x30 x30 x30                      PB heel slides  x30 x30 x30         PB HS sets  5"x20 1-2" x 30 1-2"x30         HR   30x @ bar 30x @bar         Ther Activity             TRX squats     x20         6 inch step ups    Fwd/lat 2x10 ea         Sidestepping     YTB 3 laps         Gait Training                                       Modalities

## 2021-09-12 DIAGNOSIS — E11.00 HYPEROSMOLAR NON-KETOTIC STATE IN PATIENT WITH TYPE 2 DIABETES MELLITUS (HCC): ICD-10-CM

## 2021-09-13 RX ORDER — GLIMEPIRIDE 2 MG/1
TABLET ORAL
Qty: 30 TABLET | Refills: 0 | Status: SHIPPED | OUTPATIENT
Start: 2021-09-13 | End: 2021-10-11

## 2021-09-17 ENCOUNTER — APPOINTMENT (OUTPATIENT)
Dept: PHYSICAL THERAPY | Facility: CLINIC | Age: 66
End: 2021-09-17
Payer: MEDICARE

## 2021-09-17 DIAGNOSIS — F41.1 GENERALIZED ANXIETY DISORDER: ICD-10-CM

## 2021-09-17 DIAGNOSIS — I10 ESSENTIAL HYPERTENSION: ICD-10-CM

## 2021-09-17 DIAGNOSIS — I10 RESISTANT HYPERTENSION: ICD-10-CM

## 2021-09-17 RX ORDER — AMLODIPINE BESYLATE 10 MG/1
TABLET ORAL
Qty: 30 TABLET | Refills: 0 | Status: SHIPPED | OUTPATIENT
Start: 2021-09-17 | End: 2021-10-15

## 2021-09-17 RX ORDER — CHLORTHALIDONE 25 MG/1
TABLET ORAL
Qty: 30 TABLET | Refills: 0 | Status: SHIPPED | OUTPATIENT
Start: 2021-09-17 | End: 2021-09-27

## 2021-09-17 RX ORDER — BUSPIRONE HYDROCHLORIDE 10 MG/1
TABLET ORAL
Qty: 60 TABLET | Refills: 5 | Status: SHIPPED | OUTPATIENT
Start: 2021-09-17 | End: 2021-09-27

## 2021-09-27 DIAGNOSIS — F41.1 GENERALIZED ANXIETY DISORDER: ICD-10-CM

## 2021-09-27 DIAGNOSIS — E78.5 DYSLIPIDEMIA: ICD-10-CM

## 2021-09-27 DIAGNOSIS — I10 RESISTANT HYPERTENSION: ICD-10-CM

## 2021-09-27 RX ORDER — ATORVASTATIN CALCIUM 40 MG/1
TABLET, FILM COATED ORAL
Qty: 30 TABLET | Refills: 2 | Status: SHIPPED | OUTPATIENT
Start: 2021-09-27 | End: 2021-12-29

## 2021-09-27 RX ORDER — CHLORTHALIDONE 25 MG/1
TABLET ORAL
Qty: 30 TABLET | Refills: 0 | Status: SHIPPED | OUTPATIENT
Start: 2021-09-27 | End: 2021-11-15

## 2021-09-27 RX ORDER — BUSPIRONE HYDROCHLORIDE 10 MG/1
TABLET ORAL
Qty: 60 TABLET | Refills: 5 | Status: SHIPPED | OUTPATIENT
Start: 2021-09-27 | End: 2022-06-14

## 2021-09-28 ENCOUNTER — TELEPHONE (OUTPATIENT)
Dept: OBGYN CLINIC | Facility: CLINIC | Age: 66
End: 2021-09-28

## 2021-09-28 ENCOUNTER — HOSPITAL ENCOUNTER (OUTPATIENT)
Facility: HOSPITAL | Age: 66
Setting detail: OUTPATIENT SURGERY
End: 2021-09-28
Attending: ORTHOPAEDIC SURGERY | Admitting: ORTHOPAEDIC SURGERY
Payer: MEDICARE

## 2021-09-28 ENCOUNTER — OFFICE VISIT (OUTPATIENT)
Dept: OBGYN CLINIC | Facility: CLINIC | Age: 66
End: 2021-09-28
Payer: MEDICARE

## 2021-09-28 VITALS
SYSTOLIC BLOOD PRESSURE: 139 MMHG | HEART RATE: 61 BPM | WEIGHT: 146 LBS | BODY MASS INDEX: 22.87 KG/M2 | DIASTOLIC BLOOD PRESSURE: 75 MMHG

## 2021-09-28 DIAGNOSIS — Z79.82 ENCOUNTER FOR MONITORING LOW DOSE ASPIRIN THERAPY: ICD-10-CM

## 2021-09-28 DIAGNOSIS — Z51.81 ENCOUNTER FOR MONITORING LOW DOSE ASPIRIN THERAPY: ICD-10-CM

## 2021-09-28 DIAGNOSIS — M17.11 PRIMARY OSTEOARTHRITIS OF RIGHT KNEE: Primary | ICD-10-CM

## 2021-09-28 DIAGNOSIS — Z01.818 PREOPERATIVE CLEARANCE: ICD-10-CM

## 2021-09-28 PROCEDURE — 99214 OFFICE O/P EST MOD 30 MIN: CPT | Performed by: ORTHOPAEDIC SURGERY

## 2021-09-28 RX ORDER — GABAPENTIN 100 MG/1
300 CAPSULE ORAL ONCE
Status: CANCELLED | OUTPATIENT
Start: 2021-09-28 | End: 2021-09-28

## 2021-09-28 RX ORDER — ACETAMINOPHEN 325 MG/1
975 TABLET ORAL ONCE
Status: CANCELLED | OUTPATIENT
Start: 2021-09-28 | End: 2021-09-28

## 2021-09-28 RX ORDER — CHLORHEXIDINE GLUCONATE 4 G/100ML
SOLUTION TOPICAL DAILY PRN
Status: CANCELLED | OUTPATIENT
Start: 2021-09-28

## 2021-09-28 NOTE — PROGRESS NOTES
Patient Name:  Eloy Melvin  MRN:  [id number]    Assessment & Plan     1  Primary osteoarthritis of right knee        I have reviewed and discussed diagnostic images including right knee xrays displaying right knee osteoarthritis most significant in the medial compartment with the patient in great detail  We discussed the patient's diagnosis and associated treatment options including conservative and surgical treatment  Conservative management would include oral analgesics, NSAIDs, bracing therapy activity modification  Risks of conservative management include: limited relief of symptoms and continued pain affecting activities of daily living  Surgical intervention was also discussed in the form of right total knee arthroplasty  Risks of surgery, including but not limited to, anesthesia complications, infection, damage to nerves and blood vessels, blood clots, postoperative stiffness, residual pain, need for subsequent surgery, periprosthetic fracture, stiffness, gait dysfunction and even death were discussed at length  The patient understands the risks and benefits of all mention treatment options and has no further questions  He has failed conservative management up into those point including oral analgesics, activity modification, and injection therapy  The patient has elected to proceed forward with right total knee arthroplasty  Pt was advised that he will need preop clearance  from his PCP and cardiologist within 30 days of surgery  He was advised on smoking cessation prior to surgery as it increases the ridsk of infection and wound complications  He was advised to keep his A1c at a controlled level, at this time it is 5 7  I will see him on the day of surgery  He understands the need for out patient formal physical therapy postoperatively  Post op DVT prophylaxis was discussed with patient  History of the Present Illness   Eloy Melvin is a 72 y o  male with right knee osteoarthritis   PT was recently seen in the office 8/17/2021 where he received a CSI that provided him with little relief for 3 days and increased his blood glucose level  Today he states that he has continued significant right knee pain that interferes with his daily activities  Pt states that he takes ASA 81 mg Pt has DM     Pt previously reported that he has been having  Right knee pain since an injury years ago in which he "tore everything" in his knee; s/p Right knee arthroscopy  Patient has previously tried medial  brace, visco supplementation and cortisone steroid injections without much relief    Review of Systems     Review of Systems   Constitutional: Negative for chills, fever and unexpected weight change  HENT: Negative for hearing loss, nosebleeds and sore throat  Eyes: Negative for pain, redness and visual disturbance  Respiratory: Negative for cough, shortness of breath and wheezing  Cardiovascular: Negative for chest pain, palpitations and leg swelling  Gastrointestinal: Negative for abdominal pain, nausea and vomiting  Endocrine: Negative for polydipsia and polyuria  Genitourinary: Negative for dysuria and hematuria  Skin: Negative for rash and wound  Neurological: Negative for dizziness, light-headedness and headaches  Psychiatric/Behavioral: Negative for decreased concentration, dysphoric mood and suicidal ideas  The patient is not nervous/anxious  Physical Exam     /75   Pulse 61   Wt 66 2 kg (146 lb)   BMI 22 87 kg/m²      Cardiac:  Regular rate  Obvious murmur appreciated      Pulmonary: No respiratory distress  Lung sounds clear to auscultation  Right  Knee  Range of motion from 3 to 110  There is patella  femoral  crepitus with range of motion  There is a small effusion  There is tenderness over the medial and lateral      There is 4+/5 quadriceps strength and normal tone  The patient is able to perform a straight leg raise    Varus alignment positive  patellar grind test   Anterior drawer tests is positive   positive  Lachman Test    Posterior drawer test is   negative   Varus stress testing reveals no varus instability at 0 and 30 degrees   Valgus stress testing reveals instability at 0 deg with firm endpoint upon correction to neutral alignment   The patient is neurovascular intact distally  Data Review     I have personally reviewed pertinent films in PACS, and my interpretation follows  No new images reviewed     Social History     Tobacco Use    Smoking status: Light Tobacco Smoker     Packs/day: 0 25     Years: 40 00     Pack years: 10 00     Types: Cigarettes    Smokeless tobacco: Never Used   Vaping Use    Vaping Use: Never used   Substance Use Topics    Alcohol use:  Yes     Alcohol/week: 0 0 standard drinks     Comment: occasionally    Drug use: Never           Procedures    Scribe Attestation    I,:  Senait Stout am acting as a scribe while in the presence of the attending physician :       I,:  Kim Escobar DO personally performed the services described in this documentation    as scribed in my presence :

## 2021-09-29 ENCOUNTER — TELEPHONE (OUTPATIENT)
Dept: OBGYN CLINIC | Facility: CLINIC | Age: 66
End: 2021-09-29

## 2021-09-29 DIAGNOSIS — E11.9 DIABETES MELLITUS TYPE 2 IN NONOBESE (HCC): ICD-10-CM

## 2021-09-30 ENCOUNTER — TELEPHONE (OUTPATIENT)
Dept: CARDIOLOGY CLINIC | Facility: CLINIC | Age: 66
End: 2021-09-30

## 2021-10-08 ENCOUNTER — TELEPHONE (OUTPATIENT)
Dept: FAMILY MEDICINE CLINIC | Facility: CLINIC | Age: 66
End: 2021-10-08

## 2021-10-08 DIAGNOSIS — Z20.822 CLOSE EXPOSURE TO COVID-19 VIRUS: Primary | ICD-10-CM

## 2021-10-09 PROCEDURE — U0005 INFEC AGEN DETEC AMPLI PROBE: HCPCS | Performed by: FAMILY MEDICINE

## 2021-10-09 PROCEDURE — U0003 INFECTIOUS AGENT DETECTION BY NUCLEIC ACID (DNA OR RNA); SEVERE ACUTE RESPIRATORY SYNDROME CORONAVIRUS 2 (SARS-COV-2) (CORONAVIRUS DISEASE [COVID-19]), AMPLIFIED PROBE TECHNIQUE, MAKING USE OF HIGH THROUGHPUT TECHNOLOGIES AS DESCRIBED BY CMS-2020-01-R: HCPCS | Performed by: FAMILY MEDICINE

## 2021-10-10 DIAGNOSIS — E11.00 HYPEROSMOLAR NON-KETOTIC STATE IN PATIENT WITH TYPE 2 DIABETES MELLITUS (HCC): ICD-10-CM

## 2021-10-11 RX ORDER — GLIMEPIRIDE 2 MG/1
TABLET ORAL
Qty: 30 TABLET | Refills: 0 | Status: SHIPPED | OUTPATIENT
Start: 2021-10-11 | End: 2021-11-10

## 2021-10-15 DIAGNOSIS — I10 ESSENTIAL HYPERTENSION: ICD-10-CM

## 2021-10-15 RX ORDER — AMLODIPINE BESYLATE 10 MG/1
TABLET ORAL
Qty: 30 TABLET | Refills: 0 | Status: SHIPPED | OUTPATIENT
Start: 2021-10-15 | End: 2022-01-21

## 2021-10-18 DIAGNOSIS — I10 ESSENTIAL HYPERTENSION: ICD-10-CM

## 2021-10-18 RX ORDER — LISINOPRIL 40 MG/1
TABLET ORAL
Qty: 90 TABLET | Refills: 2 | Status: SHIPPED | OUTPATIENT
Start: 2021-10-18 | End: 2022-07-11

## 2021-10-26 ENCOUNTER — PATIENT OUTREACH (OUTPATIENT)
Dept: OBGYN CLINIC | Facility: HOSPITAL | Age: 66
End: 2021-10-26

## 2021-10-27 ENCOUNTER — HOSPITAL ENCOUNTER (OUTPATIENT)
Dept: NON INVASIVE DIAGNOSTICS | Facility: CLINIC | Age: 66
Discharge: HOME | End: 2021-10-27
Payer: MEDICARE

## 2021-10-27 VITALS
SYSTOLIC BLOOD PRESSURE: 146 MMHG | OXYGEN SATURATION: 99 % | BODY MASS INDEX: 22.91 KG/M2 | HEART RATE: 62 BPM | WEIGHT: 146 LBS | DIASTOLIC BLOOD PRESSURE: 92 MMHG | HEIGHT: 67 IN

## 2021-10-27 DIAGNOSIS — Z01.810 PREOPERATIVE CARDIOVASCULAR EXAMINATION: ICD-10-CM

## 2021-10-27 DIAGNOSIS — I44.7 LBBB (LEFT BUNDLE BRANCH BLOCK): ICD-10-CM

## 2021-10-27 DIAGNOSIS — E78.2 MIXED HYPERLIPIDEMIA: ICD-10-CM

## 2021-10-27 DIAGNOSIS — I10 ESSENTIAL HYPERTENSION: ICD-10-CM

## 2021-10-27 DIAGNOSIS — I25.10 CORONARY ARTERY DISEASE INVOLVING NATIVE CORONARY ARTERY OF NATIVE HEART, UNSPECIFIED WHETHER ANGINA PRESENT: ICD-10-CM

## 2021-10-27 LAB
NUC REST DIASTOLIC VOLUME INDEX: 124 ML/M2
NUC REST SYSTOLIC VOLUME INDEX: 58 ML/M2
NUC STRESS EJECTION FRACTION: 52 %
SL CV REST NUCLEAR ISOTOPE DOSE: 10.94 MCI
SL CV STRESS NUCLEAR ISOTOPE DOSE: 32.9 MCI
SL CV STRESS RECOVERY BP: NORMAL MMHG
SL CV STRESS RECOVERY HR: 75 BPM
STRESS ANGINA INDEX: 0
STRESS BASELINE BP: NORMAL MMHG
STRESS BASELINE HR: 62 BPM
STRESS O2 SAT REST: 99 %
STRESS PEAK HR: 90 BPM
STRESS POST O2 SAT PEAK: 99 %
STRESS POST PEAK BP: NORMAL MMHG
STRESS/REST PERFUSION RATIO: 0.86

## 2021-10-27 PROCEDURE — 93016 CV STRESS TEST SUPVJ ONLY: CPT | Performed by: INTERNAL MEDICINE

## 2021-10-27 PROCEDURE — 93017 CV STRESS TEST TRACING ONLY: CPT

## 2021-10-27 PROCEDURE — 78452 HT MUSCLE IMAGE SPECT MULT: CPT

## 2021-10-27 PROCEDURE — A9502 TC99M TETROFOSMIN: HCPCS

## 2021-10-27 PROCEDURE — 93018 CV STRESS TEST I&R ONLY: CPT | Performed by: INTERNAL MEDICINE

## 2021-10-27 PROCEDURE — 78452 HT MUSCLE IMAGE SPECT MULT: CPT | Performed by: INTERNAL MEDICINE

## 2021-10-27 RX ADMIN — REGADENOSON 0.4 MG: 0.08 INJECTION, SOLUTION INTRAVENOUS at 09:16

## 2021-10-28 LAB
MAX DIASTOLIC BP: 92 MMHG
MAX HEART RATE: 90 BPM
MAX PREDICTED HEART RATE: 155 BPM
MAX. SYSTOLIC BP: 146 MMHG
PROTOCOL NAME: NORMAL
REASON FOR TERMINATION: NORMAL
TARGET HR FORMULA: NORMAL
TEST INDICATION: NORMAL
TIME IN EXERCISE PHASE: NORMAL

## 2021-11-04 ENCOUNTER — TELEPHONE (OUTPATIENT)
Dept: OBGYN CLINIC | Facility: CLINIC | Age: 66
End: 2021-11-04

## 2021-11-04 ENCOUNTER — PREP FOR PROCEDURE (OUTPATIENT)
Dept: CARDIOLOGY CLINIC | Facility: CLINIC | Age: 66
End: 2021-11-04

## 2021-11-04 DIAGNOSIS — R94.39 ABNORMAL STRESS TEST: Primary | ICD-10-CM

## 2021-11-04 DIAGNOSIS — I10 ESSENTIAL HYPERTENSION: ICD-10-CM

## 2021-11-04 DIAGNOSIS — I25.119 CORONARY ARTERY DISEASE WITH ANGINA PECTORIS, UNSPECIFIED VESSEL OR LESION TYPE, UNSPECIFIED WHETHER NATIVE OR TRANSPLANTED HEART (HCC): ICD-10-CM

## 2021-11-04 DIAGNOSIS — I25.10 CORONARY ARTERY DISEASE INVOLVING NATIVE CORONARY ARTERY OF NATIVE HEART, UNSPECIFIED WHETHER ANGINA PRESENT: ICD-10-CM

## 2021-11-04 RX ORDER — CARVEDILOL 6.25 MG/1
TABLET ORAL
Qty: 60 TABLET | Refills: 2 | Status: SHIPPED | OUTPATIENT
Start: 2021-11-04 | End: 2022-01-31

## 2021-11-05 ENCOUNTER — APPOINTMENT (OUTPATIENT)
Dept: LAB | Facility: CLINIC | Age: 66
End: 2021-11-05
Payer: MEDICARE

## 2021-11-05 DIAGNOSIS — I10 RESISTANT HYPERTENSION: ICD-10-CM

## 2021-11-05 DIAGNOSIS — R94.39 ABNORMAL STRESS TEST: ICD-10-CM

## 2021-11-05 DIAGNOSIS — I25.119 CORONARY ARTERY DISEASE WITH ANGINA PECTORIS, UNSPECIFIED VESSEL OR LESION TYPE, UNSPECIFIED WHETHER NATIVE OR TRANSPLANTED HEART (HCC): ICD-10-CM

## 2021-11-05 DIAGNOSIS — E78.5 DYSLIPIDEMIA: ICD-10-CM

## 2021-11-05 DIAGNOSIS — I10 ESSENTIAL HYPERTENSION: ICD-10-CM

## 2021-11-05 LAB
ALBUMIN SERPL BCP-MCNC: 4.1 G/DL (ref 3.5–5)
ALP SERPL-CCNC: 50 U/L (ref 46–116)
ALT SERPL W P-5'-P-CCNC: 32 U/L (ref 12–78)
ANION GAP SERPL CALCULATED.3IONS-SCNC: 3 MMOL/L (ref 4–13)
AST SERPL W P-5'-P-CCNC: 14 U/L (ref 5–45)
BILIRUB SERPL-MCNC: 0.61 MG/DL (ref 0.2–1)
BUN SERPL-MCNC: 25 MG/DL (ref 5–25)
CALCIUM SERPL-MCNC: 9.3 MG/DL (ref 8.3–10.1)
CHLORIDE SERPL-SCNC: 106 MMOL/L (ref 100–108)
CHOLEST SERPL-MCNC: 164 MG/DL (ref 50–200)
CO2 SERPL-SCNC: 31 MMOL/L (ref 21–32)
CREAT SERPL-MCNC: 1 MG/DL (ref 0.6–1.3)
ERYTHROCYTE [DISTWIDTH] IN BLOOD BY AUTOMATED COUNT: 13.2 % (ref 11.6–15.1)
GFR SERPL CREATININE-BSD FRML MDRD: 79 ML/MIN/1.73SQ M
GLUCOSE P FAST SERPL-MCNC: 142 MG/DL (ref 65–99)
HCT VFR BLD AUTO: 45.5 % (ref 36.5–49.3)
HDLC SERPL-MCNC: 59 MG/DL
HGB BLD-MCNC: 14.7 G/DL (ref 12–17)
INR PPP: 0.93 (ref 0.84–1.19)
LDLC SERPL CALC-MCNC: 65 MG/DL (ref 0–100)
MCH RBC QN AUTO: 31.6 PG (ref 26.8–34.3)
MCHC RBC AUTO-ENTMCNC: 32.3 G/DL (ref 31.4–37.4)
MCV RBC AUTO: 98 FL (ref 82–98)
PLATELET # BLD AUTO: 191 THOUSANDS/UL (ref 149–390)
PMV BLD AUTO: 11.8 FL (ref 8.9–12.7)
POTASSIUM SERPL-SCNC: 4.2 MMOL/L (ref 3.5–5.3)
PROT SERPL-MCNC: 7.5 G/DL (ref 6.4–8.2)
PROTHROMBIN TIME: 12.2 SECONDS (ref 11.6–14.5)
RBC # BLD AUTO: 4.65 MILLION/UL (ref 3.88–5.62)
SODIUM SERPL-SCNC: 140 MMOL/L (ref 136–145)
TRIGL SERPL-MCNC: 201 MG/DL
WBC # BLD AUTO: 11.23 THOUSAND/UL (ref 4.31–10.16)

## 2021-11-05 PROCEDURE — 85027 COMPLETE CBC AUTOMATED: CPT

## 2021-11-05 PROCEDURE — 80053 COMPREHEN METABOLIC PANEL: CPT

## 2021-11-05 PROCEDURE — 36415 COLL VENOUS BLD VENIPUNCTURE: CPT

## 2021-11-05 PROCEDURE — 80061 LIPID PANEL: CPT

## 2021-11-05 PROCEDURE — 85610 PROTHROMBIN TIME: CPT

## 2021-11-08 ENCOUNTER — TELEPHONE (OUTPATIENT)
Dept: CARDIOLOGY CLINIC | Facility: CLINIC | Age: 66
End: 2021-11-08

## 2021-11-08 NOTE — TELEPHONE ENCOUNTER
S/w patient, informed that procedure will need to be rescheduled and was agreeable to have cath on 11/12

## 2021-11-08 NOTE — TELEPHONE ENCOUNTER
Name of Liang Echavarria 89    Problem/Symptoms:He is supposed to have a cath done on November 10th and he states that no one has called with the time or what he needs to do for that day      Call back number:[phone]  Last or Next appt:

## 2021-11-09 ENCOUNTER — TELEPHONE (OUTPATIENT)
Dept: CARDIOLOGY CLINIC | Facility: CLINIC | Age: 66
End: 2021-11-09

## 2021-11-10 DIAGNOSIS — E11.00 HYPEROSMOLAR NON-KETOTIC STATE IN PATIENT WITH TYPE 2 DIABETES MELLITUS (HCC): ICD-10-CM

## 2021-11-10 RX ORDER — GLIMEPIRIDE 2 MG/1
TABLET ORAL
Qty: 30 TABLET | Refills: 0 | Status: SHIPPED | OUTPATIENT
Start: 2021-11-10 | End: 2021-12-13

## 2021-11-11 ENCOUNTER — HOSPITAL ENCOUNTER (OUTPATIENT)
Facility: HOSPITAL | Age: 66
Setting detail: OUTPATIENT SURGERY
Discharge: HOME | End: 2021-11-11
Attending: INTERNAL MEDICINE | Admitting: INTERNAL MEDICINE
Payer: MEDICARE

## 2021-11-11 VITALS
OXYGEN SATURATION: 94 % | DIASTOLIC BLOOD PRESSURE: 74 MMHG | TEMPERATURE: 98.1 F | BODY MASS INDEX: 23.6 KG/M2 | WEIGHT: 150.35 LBS | HEART RATE: 68 BPM | RESPIRATION RATE: 20 BRPM | HEIGHT: 67 IN | SYSTOLIC BLOOD PRESSURE: 150 MMHG

## 2021-11-11 DIAGNOSIS — I25.119 CORONARY ARTERY DISEASE INVOLVING NATIVE CORONARY ARTERY OF NATIVE HEART WITH ANGINA PECTORIS (HCC): Primary | ICD-10-CM

## 2021-11-11 DIAGNOSIS — R94.39 ABNORMAL STRESS TEST: ICD-10-CM

## 2021-11-11 LAB
ATRIAL RATE: 63 BPM
GLUCOSE SERPL-MCNC: 115 MG/DL (ref 65–140)
KCT BLD-ACNC: 286 SEC (ref 89–137)
P AXIS: 74 DEGREES
PR INTERVAL: 190 MS
QRS AXIS: 38 DEGREES
QRSD INTERVAL: 158 MS
QT INTERVAL: 476 MS
QTC INTERVAL: 487 MS
SPECIMEN SOURCE: ABNORMAL
T WAVE AXIS: 124 DEGREES
VENTRICULAR RATE: 63 BPM

## 2021-11-11 PROCEDURE — C9600 PERC DRUG-EL COR STENT SING: HCPCS | Performed by: INTERNAL MEDICINE

## 2021-11-11 PROCEDURE — C1894 INTRO/SHEATH, NON-LASER: HCPCS | Performed by: INTERNAL MEDICINE

## 2021-11-11 PROCEDURE — NC001 PR NO CHARGE: Performed by: INTERNAL MEDICINE

## 2021-11-11 PROCEDURE — 92928 PRQ TCAT PLMT NTRAC ST 1 LES: CPT | Performed by: INTERNAL MEDICINE

## 2021-11-11 PROCEDURE — C1725 CATH, TRANSLUMIN NON-LASER: HCPCS | Performed by: INTERNAL MEDICINE

## 2021-11-11 PROCEDURE — C1887 CATHETER, GUIDING: HCPCS | Performed by: INTERNAL MEDICINE

## 2021-11-11 PROCEDURE — C1769 GUIDE WIRE: HCPCS | Performed by: INTERNAL MEDICINE

## 2021-11-11 PROCEDURE — C1874 STENT, COATED/COV W/DEL SYS: HCPCS | Performed by: INTERNAL MEDICINE

## 2021-11-11 PROCEDURE — 93454 CORONARY ARTERY ANGIO S&I: CPT | Performed by: INTERNAL MEDICINE

## 2021-11-11 PROCEDURE — 99152 MOD SED SAME PHYS/QHP 5/>YRS: CPT | Performed by: INTERNAL MEDICINE

## 2021-11-11 PROCEDURE — 82948 REAGENT STRIP/BLOOD GLUCOSE: CPT

## 2021-11-11 PROCEDURE — 93005 ELECTROCARDIOGRAM TRACING: CPT

## 2021-11-11 PROCEDURE — 99153 MOD SED SAME PHYS/QHP EA: CPT | Performed by: INTERNAL MEDICINE

## 2021-11-11 PROCEDURE — 93010 ELECTROCARDIOGRAM REPORT: CPT | Performed by: INTERNAL MEDICINE

## 2021-11-11 PROCEDURE — 85347 COAGULATION TIME ACTIVATED: CPT

## 2021-11-11 DEVICE — XIENCE SKYPOINT™ EVEROLIMUS ELUTING CORONARY STENT SYSTEM 2.50 MM X 15 MM / RAPID-EXCHANGE
Type: IMPLANTABLE DEVICE | Site: CORONARY | Status: FUNCTIONAL
Brand: XIENCE SKYPOINT™

## 2021-11-11 RX ORDER — CLOPIDOGREL BISULFATE 75 MG/1
75 TABLET ORAL DAILY
Qty: 90 TABLET | Refills: 3 | Status: SHIPPED | OUTPATIENT
Start: 2021-11-11

## 2021-11-11 RX ORDER — FENTANYL CITRATE 50 UG/ML
INJECTION, SOLUTION INTRAMUSCULAR; INTRAVENOUS AS NEEDED
Status: DISCONTINUED | OUTPATIENT
Start: 2021-11-11 | End: 2021-11-11 | Stop reason: HOSPADM

## 2021-11-11 RX ORDER — CLOPIDOGREL BISULFATE 75 MG/1
TABLET ORAL AS NEEDED
Status: DISCONTINUED | OUTPATIENT
Start: 2021-11-11 | End: 2021-11-11 | Stop reason: HOSPADM

## 2021-11-11 RX ORDER — VERAPAMIL HYDROCHLORIDE 2.5 MG/ML
INJECTION, SOLUTION INTRAVENOUS AS NEEDED
Status: DISCONTINUED | OUTPATIENT
Start: 2021-11-11 | End: 2021-11-11 | Stop reason: HOSPADM

## 2021-11-11 RX ORDER — ASPIRIN 81 MG/1
81 TABLET, CHEWABLE ORAL DAILY
Status: DISCONTINUED | OUTPATIENT
Start: 2021-11-11 | End: 2021-11-11 | Stop reason: HOSPADM

## 2021-11-11 RX ORDER — SODIUM CHLORIDE 9 MG/ML
75 INJECTION, SOLUTION INTRAVENOUS CONTINUOUS
Status: DISPENSED | OUTPATIENT
Start: 2021-11-11 | End: 2021-11-11

## 2021-11-11 RX ORDER — SODIUM CHLORIDE 9 MG/ML
75 INJECTION, SOLUTION INTRAVENOUS CONTINUOUS
Status: DISCONTINUED | OUTPATIENT
Start: 2021-11-11 | End: 2021-11-11 | Stop reason: HOSPADM

## 2021-11-11 RX ORDER — ONDANSETRON 2 MG/ML
4 INJECTION INTRAMUSCULAR; INTRAVENOUS EVERY 6 HOURS PRN
Status: DISCONTINUED | OUTPATIENT
Start: 2021-11-11 | End: 2021-11-11 | Stop reason: HOSPADM

## 2021-11-11 RX ORDER — MIDAZOLAM HYDROCHLORIDE 2 MG/2ML
INJECTION, SOLUTION INTRAMUSCULAR; INTRAVENOUS AS NEEDED
Status: DISCONTINUED | OUTPATIENT
Start: 2021-11-11 | End: 2021-11-11 | Stop reason: HOSPADM

## 2021-11-11 RX ORDER — LIDOCAINE WITH 8.4% SOD BICARB 0.9%(10ML)
SYRINGE (ML) INJECTION AS NEEDED
Status: DISCONTINUED | OUTPATIENT
Start: 2021-11-11 | End: 2021-11-11 | Stop reason: HOSPADM

## 2021-11-11 RX ORDER — NITROGLYCERIN 20 MG/100ML
INJECTION INTRAVENOUS AS NEEDED
Status: DISCONTINUED | OUTPATIENT
Start: 2021-11-11 | End: 2021-11-11 | Stop reason: HOSPADM

## 2021-11-11 RX ORDER — ASPIRIN 325 MG
TABLET, DELAYED RELEASE (ENTERIC COATED) ORAL AS NEEDED
Status: DISCONTINUED | OUTPATIENT
Start: 2021-11-11 | End: 2021-11-11 | Stop reason: HOSPADM

## 2021-11-11 RX ORDER — ACETAMINOPHEN 325 MG/1
650 TABLET ORAL EVERY 8 HOURS PRN
Status: DISCONTINUED | OUTPATIENT
Start: 2021-11-11 | End: 2021-11-11 | Stop reason: HOSPADM

## 2021-11-11 RX ORDER — HEPARIN SODIUM 1000 [USP'U]/ML
INJECTION, SOLUTION INTRAVENOUS; SUBCUTANEOUS AS NEEDED
Status: DISCONTINUED | OUTPATIENT
Start: 2021-11-11 | End: 2021-11-11 | Stop reason: HOSPADM

## 2021-11-11 RX ORDER — SODIUM NITROPRUSSIDE 25 MG/ML
INJECTION INTRAVENOUS AS NEEDED
Status: DISCONTINUED | OUTPATIENT
Start: 2021-11-11 | End: 2021-11-11 | Stop reason: HOSPADM

## 2021-11-11 RX ADMIN — SODIUM CHLORIDE 75 ML/HR: 0.9 INJECTION, SOLUTION INTRAVENOUS at 09:05

## 2021-11-11 RX ADMIN — SODIUM CHLORIDE 75 ML/HR: 0.9 INJECTION, SOLUTION INTRAVENOUS at 07:46

## 2021-11-15 DIAGNOSIS — I10 RESISTANT HYPERTENSION: ICD-10-CM

## 2021-11-15 RX ORDER — CHLORTHALIDONE 25 MG/1
TABLET ORAL
Qty: 30 TABLET | Refills: 0 | Status: SHIPPED | OUTPATIENT
Start: 2021-11-15 | End: 2021-12-29

## 2021-12-12 DIAGNOSIS — E11.00 HYPEROSMOLAR NON-KETOTIC STATE IN PATIENT WITH TYPE 2 DIABETES MELLITUS (HCC): ICD-10-CM

## 2021-12-13 RX ORDER — GLIMEPIRIDE 2 MG/1
TABLET ORAL
Qty: 30 TABLET | Refills: 0 | Status: SHIPPED | OUTPATIENT
Start: 2021-12-13 | End: 2021-12-29

## 2021-12-29 DIAGNOSIS — I10 RESISTANT HYPERTENSION: ICD-10-CM

## 2021-12-29 DIAGNOSIS — E11.00 HYPEROSMOLAR NON-KETOTIC STATE IN PATIENT WITH TYPE 2 DIABETES MELLITUS (HCC): ICD-10-CM

## 2021-12-29 DIAGNOSIS — E78.5 DYSLIPIDEMIA: ICD-10-CM

## 2021-12-29 RX ORDER — CHLORTHALIDONE 25 MG/1
TABLET ORAL
Qty: 30 TABLET | Refills: 0 | Status: SHIPPED | OUTPATIENT
Start: 2021-12-29 | End: 2022-01-21

## 2021-12-29 RX ORDER — ATORVASTATIN CALCIUM 40 MG/1
TABLET, FILM COATED ORAL
Qty: 30 TABLET | Refills: 2 | Status: SHIPPED | OUTPATIENT
Start: 2021-12-29 | End: 2022-03-28

## 2021-12-29 RX ORDER — GLIMEPIRIDE 2 MG/1
TABLET ORAL
Qty: 30 TABLET | Refills: 0 | Status: SHIPPED | OUTPATIENT
Start: 2021-12-29 | End: 2022-01-10

## 2022-01-21 DIAGNOSIS — I10 ESSENTIAL HYPERTENSION: ICD-10-CM

## 2022-01-21 DIAGNOSIS — I10 RESISTANT HYPERTENSION: ICD-10-CM

## 2022-01-21 RX ORDER — CHLORTHALIDONE 25 MG/1
TABLET ORAL
Qty: 30 TABLET | Refills: 0 | Status: SHIPPED | OUTPATIENT
Start: 2022-01-21 | End: 2022-02-18

## 2022-01-21 RX ORDER — AMLODIPINE BESYLATE 10 MG/1
TABLET ORAL
Qty: 30 TABLET | Refills: 0 | Status: SHIPPED | OUTPATIENT
Start: 2022-01-21 | End: 2022-02-18

## 2022-01-31 DIAGNOSIS — I10 ESSENTIAL HYPERTENSION: ICD-10-CM

## 2022-01-31 RX ORDER — CARVEDILOL 6.25 MG/1
TABLET ORAL
Qty: 60 TABLET | Refills: 2 | Status: SHIPPED | OUTPATIENT
Start: 2022-01-31 | End: 2022-03-07

## 2022-02-18 DIAGNOSIS — I10 RESISTANT HYPERTENSION: ICD-10-CM

## 2022-02-18 DIAGNOSIS — I10 ESSENTIAL HYPERTENSION: ICD-10-CM

## 2022-02-18 RX ORDER — CHLORTHALIDONE 25 MG/1
TABLET ORAL
Qty: 30 TABLET | Refills: 0 | Status: SHIPPED | OUTPATIENT
Start: 2022-02-18 | End: 2022-03-28

## 2022-02-18 RX ORDER — AMLODIPINE BESYLATE 10 MG/1
TABLET ORAL
Qty: 30 TABLET | Refills: 0 | Status: SHIPPED | OUTPATIENT
Start: 2022-02-18 | End: 2022-03-21

## 2022-03-07 ENCOUNTER — OFFICE VISIT (OUTPATIENT)
Dept: CARDIOLOGY CLINIC | Facility: CLINIC | Age: 67
End: 2022-03-07
Payer: MEDICARE

## 2022-03-07 VITALS
HEART RATE: 74 BPM | RESPIRATION RATE: 16 BRPM | HEIGHT: 67 IN | SYSTOLIC BLOOD PRESSURE: 182 MMHG | OXYGEN SATURATION: 98 % | WEIGHT: 163 LBS | BODY MASS INDEX: 25.58 KG/M2 | DIASTOLIC BLOOD PRESSURE: 98 MMHG

## 2022-03-07 DIAGNOSIS — E78.2 MIXED HYPERLIPIDEMIA: ICD-10-CM

## 2022-03-07 DIAGNOSIS — I10 PRIMARY HYPERTENSION: ICD-10-CM

## 2022-03-07 DIAGNOSIS — I25.10 ATHEROSCLEROSIS OF NATIVE CORONARY ARTERY OF NATIVE HEART WITHOUT ANGINA PECTORIS: Primary | ICD-10-CM

## 2022-03-07 PROCEDURE — 99214 OFFICE O/P EST MOD 30 MIN: CPT | Performed by: INTERNAL MEDICINE

## 2022-03-07 NOTE — PROGRESS NOTES
[facility] [place] [id number] [facility]  [place] [id number]  Cardiology Follow Up    Evern Cowden  1955  [id number]      1  Atherosclerosis of native coronary artery of native heart without angina pectoris     2  Primary hypertension  Ambulatory referral to Cardiology   3  Mixed hyperlipidemia         Chief Complaint   Patient presents with    Follow-up       Interval History:   80-year-old male with coronary artery disease status post PCI of LP L1 on 11/11/2021 with drug-eluting stent x1, hypertension, hyperlipidemia, type 2 diabetes mellitus, smoker, left bundle-branch block presented for cardiology follow-up    Since last clinic visit patient underwent stress test which was abnormal   He underwent a cardiac catheterization which showed significant LP L1 lesion status post successful PCI with drug-eluting stent x1  Patient is doing well since last clinic visit  He denies chest pain, shortness of breath, palpitation, dizziness, orthopnea, leg edema or loss of conscious  Denies chest pain or shortness of breath on ambulation  He cannot exert much due to knee pain  Is thinking to undergo knee replacement in June    As per patient home blood pressure is controlled usually around 120/75  Current medications reviewed  No recent labs available for review    Review of Systems:   All review of system negative except as mentioned above    Patient Active Problem List   Diagnosis    [facility]    Hypertension complicating diabetes ([facility] )    Hyperosmolar hyponatremia    Dyslipidemia    Abnormal transaminases    Leukocytosis    Chest pain    Diabetes mellitus type 2 in nonobese ([facility] )    Tobacco abuse    Cardiomyopathy ([facility] )    Coronary artery disease involving native coronary artery of native heart with angina pectoris ([facility] )    Generalized anxiety disorder    Medicare annual wellness visit, subsequent    Arthritis of right knee    Claudication of calf muscles ([facility] )  Hypertensive heart disease without heart failure     Past Medical History:   Diagnosis Date    Diabetes mellitus ([place] [address] )     Hyperlipidemia     Hyperosmolar non-ketotic state in patient with type 2 diabetes mellitus ([place] [address] ) 12/5/2019    Hypertension     Type 2 diabetes mellitus with hyperglycemia, with long-term current use of insulin ([place] [address] ) 5/8/2020     Social History     Socioeconomic History    Marital status: /Civil Union     Spouse name: Not on file    Number of children: Not on file    Years of education: Not on file    Highest education level: Not on file   Occupational History    Not on file   Tobacco Use    Smoking status: Light Tobacco Smoker     Packs/day: 0 25     Years: 40 00     Pack years: 10 00     Types: Cigarettes    Smokeless tobacco: Never Used   Vaping Use    Vaping Use: Never used   Substance and Sexual Activity    Alcohol use: Yes     Alcohol/week: 0 0 standard drinks     Comment: occasionally    Drug use: Never    Sexual activity: Not Currently   Other Topics Concern    Not on file   Social History Narrative    Not on file     Social Determinants of Health     Financial Resource Strain: Not on file   Food Insecurity: Not on file   Transportation Needs: Not on file   Physical Activity: Not on file   Stress: Not on file   Social Connections: Not on file   Intimate Partner Violence: Not on file   Housing Stability: Not on file      Family History   Problem Relation Age of Onset    Diabetes Mother     Hyperlipidemia Mother     Dementia Mother     Hyperlipidemia Father      Past Surgical History:   Procedure Laterality Date    CARDIAC CATHETERIZATION N/A 11/11/2021    Procedure: Cardiac catheterization;  Surgeon: Florence Spurling, MD;  Location: [address] [place] [address] CATH LAB; Service: Cardiology    CARDIAC CATHETERIZATION Left 11/11/2021    Procedure: Cardiac Coronary Angiogram;  Surgeon: Florence Spurling, MD;  Location: [address] [place] [address] CATH LAB;   Service: Cardiology    COLONOSCOPY  2010    KNEE SURGERY Right        Current Outpatient Medications:     amLODIPine (NORVASC) 10 mg tablet, TAKE ONE TABLET BY MOUTH EVERY DAY, Disp: 30 tablet, Rfl: 0    aspirin 81 mg chewable tablet, Chew 1 tablet (81 mg total) daily, Disp: 20 tablet, Rfl: 0    atorvastatin (LIPITOR) 40 mg tablet, TAKE ONE TABLET BY MOUTH EVERY DAY WITH DINNER , Disp: 30 tablet, Rfl: 2    busPIRone (BUSPAR) 10 mg tablet, TAKE ONE TABLET BY MOUTH TWICE A DAY, Disp: 60 tablet, Rfl: 5    carvedilol (COREG) 6 25 mg tablet, TAKE ONE TABLET BY MOUTH TWICE A DAY WITH MEALS, Disp: 60 tablet, Rfl: 0    chlorthalidone 25 mg tablet, TAKE ONE TABLET BY MOUTH EVERY DAY, Disp: 30 tablet, Rfl: 0    clopidogrel (Plavix) 75 mg tablet, Take 1 tablet (75 mg total) by mouth daily, Disp: 90 tablet, Rfl: 3    glimepiride (AMARYL) 2 mg tablet, TAKE ONE TABLET BY MOUTH EVERY DAY WITH BREAKFAST, Disp: 90 tablet, Rfl: 1    lisinopril (ZESTRIL) 40 mg tablet, TAKE 1 TABLET BY MOUTH DAILY, Disp: 90 tablet, Rfl: 2    metFORMIN (GLUCOPHAGE) 500 mg tablet, Take 1 tablet (500 mg total) by mouth 2 (two) times a day with meals (Patient taking differently: Take 500 mg by mouth 2 (two) times a day with meals Pt reports taking 1 time daily ), Disp: 90 tablet, Rfl: 2    Multiple Vitamins-Minerals (MULTIVITAMIN WITH MINERALS) tablet, Take 1 tablet by mouth daily, Disp: , Rfl:     ascorbic acid (VITAMIN C) 500 MG tablet, Take 1 tablet (500 mg total) by mouth 2 (two) times a day, Disp: 60 tablet, Rfl: 0    ferrous sulfate 324 (65 Fe) mg, Take 1 tablet (324 mg total) by mouth 2 (two) times a day before meals (Patient not taking: Reported on 3/7/2022 ), Disp: 60 tablet, Rfl: 0    folic acid (KP Folic Acid) 1 mg tablet, Take 1 tablet (1 mg total) by mouth daily, Disp: 30 tablet, Rfl: 0    gabapentin (NEURONTIN) 300 mg capsule, Take 1 capsule (300 mg total) by mouth 3 (three) times a day Start by taking 1 tablet at bedtime for 3 days    Then increase to 1 tablet in the evening and 1 tablet at bedtime for 3 days  Then increase to 1 tablet in the morning, 1 tablet in the evening, and 1 tablet at bedtime thereafter   (Patient not taking: Reported on 9/28/2021), Disp: 90 capsule, Rfl: 1  No Known Allergies    Labs:  Admission on 11/11/2021, Discharged on 11/11/2021   Component Date Value    POC Glucose 11/11/2021 115     Activated Clotting Time,* 11/11/2021 286*    Specimen Type 11/11/2021 ARTERIAL     Ventricular Rate 11/11/2021 63     Atrial Rate 11/11/2021 63     MO Interval 11/11/2021 190     QRSD Interval 11/11/2021 158     QT Interval 11/11/2021 476     QTC Interval 11/11/2021 487     P Axis 11/11/2021 74     QRS Axis 11/11/2021 38     T Wave Axis 11/11/2021 124    Appointment on 11/05/2021   Component Date Value    WBC 11/05/2021 11 23*    RBC 11/05/2021 4 65     Hemoglobin 11/05/2021 14 7     Hematocrit 11/05/2021 45 5     MCV 11/05/2021 98     MCH 11/05/2021 31 6     MCHC 11/05/2021 32 3     RDW 11/05/2021 13 2     Platelets 32/84/1851 191     MPV 11/05/2021 11 8     Protime 11/05/2021 12 2     INR 11/05/2021 0 93     Sodium 11/05/2021 140     Potassium 11/05/2021 4 2     Chloride 11/05/2021 106     CO2 11/05/2021 31     ANION GAP 11/05/2021 3*    BUN 11/05/2021 25     Creatinine 11/05/2021 1 00     Glucose, Fasting 11/05/2021 142*    Calcium 11/05/2021 9 3     AST 11/05/2021 14     ALT 11/05/2021 32     Alkaline Phosphatase 11/05/2021 50     Total Protein 11/05/2021 7 5     Albumin 11/05/2021 4 1     Total Bilirubin 11/05/2021 0 61     eGFR 11/05/2021 79     Cholesterol 11/05/2021 164     Triglycerides 11/05/2021 201*    HDL, Direct 11/05/2021 59     LDL Calculated 11/05/2021 [address] Outpatient Visit on 10/27/2021   Component Date Value    Rest Nuclear Isotope Dose 10/27/2021 10 94     Stress Nuclear Isotope D* 10/27/2021 32 90     Baseline HR 10/27/2021 62     Baseline BP 10/27/2021 146/92     O2 sat rest 10/27/2021 99     Stress peak HR 10/27/2021 90     Post peak BP 10/27/2021 136/72     O2 sat peak 10/27/2021 99     Recovery HR 10/27/2021 75     Recovery BP 10/27/2021 146/76     Angina Index 10/27/2021 0     Stress/rest perfusion ra* 10/27/2021 6 86     End systolic index (mL/m* 69/36/8682 30 8     End diastolic index (mL/* 17/90/2463 124 0     EF (%) 10/27/2021 52     Protocol Name 10/27/2021 LEXISCAN-SIT     Time In Exercise Phase 10/27/2021 00:03:00     MAX  SYSTOLIC BP 83/18/2220 060     Max Diastolic Bp 60/26/9570 92     Max Heart Rate 10/27/2021 90     Max Predicted Heart Rate 10/27/2021 155     Reason for Termination 10/27/2021 Protocol Complete     Test Indication 10/27/2021 cad, lbbb     Target Hr Formular 10/27/2021 (220 - Age)*85%    Orders Only on 10/09/2021   Component Date Value    SARS-CoV-2 10/09/2021 Negative      Imaging: No results found      Physical Exam:  General:  Average built, awake, alert and oriented x3, not in distress  Neck: supple, no JVD  Eyes: PERRL, conjunctiva normal  Lungs:  Bilateral air entry positive, no wheeze/rhonchi or crackle  Heart:  S1-S2 normal, no murmur  Abdomen:  Soft ,nondistended ,nontender, bowel sounds positive  Extremities:  No leg edema, no deformity, ROM normal  Neuro:  Moving all extremities, speech clear  Skin: warm, no rash     BP (!) 182/98 (BP Location: Left arm, Patient Position: Sitting, Cuff Size: Standard)   Pulse 74   Resp 16   Ht 5' 7" (1 702 m)   Wt 73 9 kg (163 lb)   SpO2 98%   BMI 25 53 kg/m²     Cardiographics :  Cardiac catheterization on 11/11/2021:  LP L1 90% stenosis status post successful PCI with drug-eluting stent x1(Xience sky2 5 x 15mm and post dilated with 275 x 12)  Mid circumflex 60% stenosis    Lexiscan MPI stress test in October 2021  Defect inferoseptal inferoapical wall with hypokinesis    Echocardiogram in March 2021 showed LVEF of 50-55% with abnormal septal wall likely due to LBBB, atrial septal aneurysm, mild TR   Echocardiogram showed LVEF 78-65%, grade 1 diastolic dysfunction, paradoxical septal motion from LBBB, mildly dilated LA, possible atrial septal aneurysm, trace MR, trace TR   Cardiac catheterization on 05/11/2020 showed mid circumflex 50% stenosis, LP L1 60- 70% stenosis status post negative IFR 0 93, L PDA 30% stenosis    Assessment:    1  Coronary artery disease  Status post PCI of LP L1 on 11/11/2021  Currently denies anginal symptoms    2  Hypertension  Repeat blood pressure manually by me was 150/70  3  Hyperlipidemia  4  Type 2 diabetes mellitus  5  Knee osteoarthritis    Recommendations:    Patient is doing well from cardiology standpoint at present time  Patient to continue aspirin 81, Plavix 75, cardio 6 25 mg twice a day, chlorthalidone 25 mg lisinopril 40 mg and Norvasc 10 mg  Continue Lipitor 80 mg    Advised to monitor blood pressure at home and call us back if it stays more than 130/80    Advised to take low-salt, low-fat/low-cholesterol diabetic diet  Return to clinic in 6 months or early as needed  Above all discussed with patient    Patient understands and agrees

## 2022-03-21 DIAGNOSIS — I10 ESSENTIAL HYPERTENSION: ICD-10-CM

## 2022-03-21 RX ORDER — AMLODIPINE BESYLATE 10 MG/1
TABLET ORAL
Qty: 30 TABLET | Refills: 0 | Status: SHIPPED | OUTPATIENT
Start: 2022-03-21 | End: 2022-04-22

## 2022-03-27 DIAGNOSIS — I10 RESISTANT HYPERTENSION: ICD-10-CM

## 2022-03-27 DIAGNOSIS — E78.5 DYSLIPIDEMIA: ICD-10-CM

## 2022-03-28 RX ORDER — CHLORTHALIDONE 25 MG/1
TABLET ORAL
Qty: 30 TABLET | Refills: 0 | Status: SHIPPED | OUTPATIENT
Start: 2022-03-28 | End: 2022-04-21

## 2022-03-28 RX ORDER — ATORVASTATIN CALCIUM 40 MG/1
TABLET, FILM COATED ORAL
Qty: 30 TABLET | Refills: 2 | Status: SHIPPED | OUTPATIENT
Start: 2022-03-28 | End: 2022-07-05

## 2022-04-07 DIAGNOSIS — I10 ESSENTIAL HYPERTENSION: ICD-10-CM

## 2022-04-07 RX ORDER — CARVEDILOL 6.25 MG/1
TABLET ORAL
Qty: 60 TABLET | Refills: 0 | Status: SHIPPED | OUTPATIENT
Start: 2022-04-07 | End: 2022-05-09

## 2022-04-20 ENCOUNTER — OFFICE VISIT (OUTPATIENT)
Dept: FAMILY MEDICINE CLINIC | Facility: CLINIC | Age: 67
End: 2022-04-20
Payer: MEDICARE

## 2022-04-20 VITALS
BODY MASS INDEX: 25.9 KG/M2 | HEIGHT: 67 IN | TEMPERATURE: 96.8 F | HEART RATE: 68 BPM | DIASTOLIC BLOOD PRESSURE: 68 MMHG | SYSTOLIC BLOOD PRESSURE: 128 MMHG | OXYGEN SATURATION: 98 % | WEIGHT: 165 LBS

## 2022-04-20 DIAGNOSIS — Z00.00 MEDICARE ANNUAL WELLNESS VISIT, SUBSEQUENT: ICD-10-CM

## 2022-04-20 DIAGNOSIS — F17.210 CIGARETTE NICOTINE DEPENDENCE WITHOUT COMPLICATION: ICD-10-CM

## 2022-04-20 DIAGNOSIS — E78.5 HYPERLIPIDEMIA ASSOCIATED WITH TYPE 2 DIABETES MELLITUS (HCC): ICD-10-CM

## 2022-04-20 DIAGNOSIS — I15.2 HYPERTENSION COMPLICATING DIABETES (HCC): ICD-10-CM

## 2022-04-20 DIAGNOSIS — I11.9 HYPERTENSIVE HEART DISEASE WITHOUT HEART FAILURE: ICD-10-CM

## 2022-04-20 DIAGNOSIS — E11.59 HYPERTENSION COMPLICATING DIABETES (HCC): ICD-10-CM

## 2022-04-20 DIAGNOSIS — I42.9 CARDIOMYOPATHY, UNSPECIFIED TYPE (HCC): ICD-10-CM

## 2022-04-20 DIAGNOSIS — E11.9 DIABETES MELLITUS TYPE 2 IN NONOBESE (HCC): Primary | ICD-10-CM

## 2022-04-20 DIAGNOSIS — I73.9 CLAUDICATION OF CALF MUSCLES (HCC): ICD-10-CM

## 2022-04-20 DIAGNOSIS — E11.69 HYPERLIPIDEMIA ASSOCIATED WITH TYPE 2 DIABETES MELLITUS (HCC): ICD-10-CM

## 2022-04-20 DIAGNOSIS — I25.119 CORONARY ARTERY DISEASE INVOLVING NATIVE CORONARY ARTERY OF NATIVE HEART WITH ANGINA PECTORIS (HCC): ICD-10-CM

## 2022-04-20 DIAGNOSIS — Z12.11 COLON CANCER SCREENING: ICD-10-CM

## 2022-04-20 PROBLEM — N17.9 AKI (ACUTE KIDNEY INJURY) (HCC): Status: RESOLVED | Noted: 2019-12-05 | Resolved: 2022-04-20

## 2022-04-20 LAB — SL AMB POCT HEMOGLOBIN AIC: 5.9 (ref ?–6.5)

## 2022-04-20 PROCEDURE — G0439 PPPS, SUBSEQ VISIT: HCPCS | Performed by: PHYSICIAN ASSISTANT

## 2022-04-20 PROCEDURE — 99214 OFFICE O/P EST MOD 30 MIN: CPT | Performed by: PHYSICIAN ASSISTANT

## 2022-04-20 PROCEDURE — 1123F ACP DISCUSS/DSCN MKR DOCD: CPT | Performed by: PHYSICIAN ASSISTANT

## 2022-04-20 PROCEDURE — 83036 HEMOGLOBIN GLYCOSYLATED A1C: CPT | Performed by: PHYSICIAN ASSISTANT

## 2022-04-20 NOTE — PROGRESS NOTES
Assessment and Plan:     Problem List Items Addressed This Visit        Endocrine    Hypertension complicating diabetes ([facility] 75 )    Diabetes mellitus type 2 in nonobese ([place] [address] ) - Primary    Relevant Orders    POCT hemoglobin A1c (Completed)    Comprehensive metabolic panel    Microalbumin / creatinine urine ratio    Hyperlipidemia associated with type 2 diabetes mellitus ([facility] )    Relevant Orders    Lipid Panel with Direct LDL reflex       Cardiovascular and Mediastinum    Cardiomyopathy ([place] [address] )    Coronary artery disease involving native coronary artery of native heart with angina pectoris ([place] [address] )    Hypertensive heart disease without heart failure       Other    Medicare annual wellness visit, subsequent    Claudication of calf muscles ([place] [address] )    Cigarette nicotine dependence without complication    Relevant Orders    CT lung screening program      Other Visit Diagnoses     Colon cancer screening        Relevant Orders    Cologuard           Preventive health issues were discussed with patient, and age appropriate screening tests were ordered as noted in patient's After Visit Summary  Personalized health advice and appropriate referrals for health education or preventive services given if needed, as noted in patient's After Visit Summary       History of Present Illness:     Patient presents for Medicare Annual Wellness visit    Patient Care Team:  Timo Bowman PA-C as PCP - General (Family Medicine)  Carolina Juarez MD as PCP - PCP-[facility] ([facility])     Problem List:     Patient Active Problem List   Diagnosis    Hypertension complicating diabetes ([facility] )    Hyperosmolar hyponatremia    Dyslipidemia    Abnormal transaminases    Leukocytosis    Chest pain    Diabetes mellitus type 2 in nonobese ([place] [address] )    Tobacco abuse    Cardiomyopathy ([place] [address] )    Coronary artery disease involving native coronary artery of native heart with angina pectoris ([place] [address] )    Generalized anxiety disorder    Medicare annual wellness visit, subsequent    Arthritis of right knee    Claudication of calf muscles ([facility] )    Hypertensive heart disease without heart failure    Hyperlipidemia associated with type 2 diabetes mellitus ([place] [address] )    Cigarette nicotine dependence without complication      Past Medical and Surgical History:     Past Medical History:   Diagnosis Date    Diabetes mellitus ([place] [address] )     Hyperlipidemia     Hyperosmolar non-ketotic state in patient with type 2 diabetes mellitus ([place] [address] ) 12/5/2019    Hypertension     Type 2 diabetes mellitus with hyperglycemia, with long-term current use of insulin ([place] [address] ) 5/8/2020     Past Surgical History:   Procedure Laterality Date    CARDIAC CATHETERIZATION N/A 11/11/2021    Procedure: Cardiac catheterization;  Surgeon: Beverly Campa MD;  Location: [address] [place] [address] CATH LAB; Service: Cardiology    CARDIAC CATHETERIZATION Left 11/11/2021    Procedure: Cardiac Coronary Angiogram;  Surgeon: Beverly Campa MD;  Location: [address] [place] [address] CATH LAB; Service: Cardiology    COLONOSCOPY  2010    KNEE SURGERY Right       Family History:     Family History   Problem Relation Age of Onset    Diabetes Mother     Hyperlipidemia Mother     Dementia Mother     Hyperlipidemia Father       Social History:     Social History     Socioeconomic History    Marital status: /Civil Union     Spouse name: None    Number of children: None    Years of education: None    Highest education level: None   Occupational History    None   Tobacco Use    Smoking status: Light Tobacco Smoker     Packs/day: 0 25     Years: 40 00     Pack years: 10 00     Types: Cigarettes    Smokeless tobacco: Never Used   Vaping Use    Vaping Use: Never used   Substance and Sexual Activity    Alcohol use:  Yes     Alcohol/week: 0 0 standard drinks     Comment: occasionally    Drug use: Never    Sexual activity: Not Currently   Other Topics Concern    None   Social History Narrative    None     Social Determinants of Health Financial Resource Strain: Not on file   Food Insecurity: Not on file   Transportation Needs: Not on file   Physical Activity: Not on file   Stress: Not on file   Social Connections: Not on file   Intimate Partner Violence: Not on file   Housing Stability: Not on file      Medications and Allergies:     Current Outpatient Medications   Medication Sig Dispense Refill    amLODIPine (NORVASC) 10 mg tablet TAKE ONE TABLET BY MOUTH EVERY DAY 30 tablet 0    aspirin 81 mg chewable tablet Chew 1 tablet (81 mg total) daily 20 tablet 0    atorvastatin (LIPITOR) 40 mg tablet TAKE ONE TABLET BY MOUTH EVERY DAY WITH DINNER 30 tablet 2    busPIRone (BUSPAR) 10 mg tablet TAKE ONE TABLET BY MOUTH TWICE A DAY 60 tablet 5    carvedilol (COREG) 6 25 mg tablet TAKE ONE TABLET BY MOUTH TWICE A DAY WITH MEALS 60 tablet 0    chlorthalidone 25 mg tablet TAKE ONE TABLET BY MOUTH EVERY DAY 30 tablet 0    clopidogrel (Plavix) 75 mg tablet Take 1 tablet (75 mg total) by mouth daily 90 tablet 3    ferrous sulfate 324 (65 Fe) mg Take 1 tablet (324 mg total) by mouth 2 (two) times a day before meals (Patient not taking: Reported on 3/7/2022 ) 60 tablet 0    glimepiride (AMARYL) 2 mg tablet TAKE ONE TABLET BY MOUTH EVERY DAY WITH BREAKFAST 90 tablet 1    lisinopril (ZESTRIL) 40 mg tablet TAKE 1 TABLET BY MOUTH DAILY 90 tablet 2    metFORMIN (GLUCOPHAGE) 500 mg tablet Take 1 tablet (500 mg total) by mouth 2 (two) times a day with meals (Patient taking differently: Take 500 mg by mouth 2 (two) times a day with meals Pt reports taking 1 time daily ) 90 tablet 2    Multiple Vitamins-Minerals (MULTIVITAMIN WITH MINERALS) tablet Take 1 tablet by mouth daily       No current facility-administered medications for this visit       No Known Allergies   Immunizations:     Immunization History   Administered Date(s) Administered    COVID-19 MODERNA VACC 0 5 ML IM 04/16/2021, 05/14/2021, 11/15/2021    Hep B, Adolescent or Pediatric 08/11/2008  Hep B, adult 10/13/2008    Influenza, recombinant, quadrivalent,injectable, preservative free 12/05/2019, 08/31/2020    Pneumococcal Polysaccharide PPV23 12/08/2019    Tdap 08/11/2008      Health Maintenance:         Topic Date Due    Hepatitis C Screening  Never done    Colorectal Cancer Screening  Never done         Topic Date Due    DTaP,Tdap,and Td Vaccines (2 - Td or Tdap) 08/11/2018      Medicare Health Risk Assessment:     /68   Pulse 68   Temp (!) 96 8 °F (36 °C)   Ht 5' 7" (1 702 m)   Wt 74 8 kg (165 lb)   SpO2 98%   BMI 25 84 kg/m²      Meliton Monreal is here for his Subsequent Wellness visit  Health Risk Assessment:   Patient rates overall health as very good  Patient feels that their physical health rating is same  Patient is very satisfied with their life  Eyesight was rated as same  Hearing was rated as same  Patient feels that their emotional and mental health rating is same  Patients states they are never, rarely angry  Patient states they are never, rarely unusually tired/fatigued  Pain experienced in the last 7 days has been some  Patient's pain rating has been 5/10  Patient states that he has experienced no weight loss or gain in last 6 months  Needs right knee replacement    Depression Screening:   PHQ-2 Score: 0      Fall Risk Screening: In the past year, patient has experienced: no history of falling in past year      Home Safety:  Patient does not have trouble with stairs inside or outside of their home  Patient has working smoke alarms and has working carbon monoxide detector  Home safety hazards include: none  Nutrition:   Current diet is Regular  Medications:   Patient is currently taking over-the-counter supplements  OTC medications include: see medication list  Patient is able to manage medications       Activities of Daily Living (ADLs)/Instrumental Activities of Daily Living (IADLs):   Walk and transfer into and out of bed and chair?: Yes  Dress and groom yourself?: Yes    Bathe or shower yourself?: Yes    Feed yourself? Yes  Do your laundry/housekeeping?: Yes  Manage your money, pay your bills and track your expenses?: Yes  Make your own meals?: Yes    Do your own shopping?: Yes    Previous Hospitalizations:   Any hospitalizations or ED visits within the last 12 months?: Yes    How many hospitalizations have you had in the last year?: 1-2    Advance Care Planning:   Living will: No    Durable POA for healthcare: No    Advanced directive: No    Advanced directive counseling given: Yes    Five wishes given: Yes      PREVENTIVE SCREENINGS      Cardiovascular Screening:    General: Screening Not Indicated and History Lipid Disorder      Diabetes Screening:     General: Screening Not Indicated and History Diabetes      Colorectal Cancer Screening:     General: Risks and Benefits Discussed    Due for: Cologuard      Prostate Cancer Screening:    General: Screening Current      Osteoporosis Screening:    General: Risks and Benefits Discussed and Screening Not Indicated      Abdominal Aortic Aneurysm (AAA) Screening:    Risk factors include: age between 73-67 yo and tobacco use        Lung Cancer Screening:     General: Risks and Benefits Discussed    Due for: Low Dose CT (LDCT)      Hepatitis C Screening:    General: Risks and Benefits Discussed    Screening, Brief Intervention, and Referral to Treatment (SBIRT)    Screening  Typical number of drinks in a day: 0  Typical number of drinks in a week: 1  Interpretation: Low risk drinking behavior      Single Item Drug Screening:  How often have you used an illegal drug (including marijuana) or a prescription medication for non-medical reasons in the past year? daily or almost daily  What drug(s) or prescription medication(s)? daily medical marijuana     Single Item Drug Screen Score: 4  Interpretation: POSITIVE screen for possible drug use disorder    Drug Abuse Screening Test (DAST-10):  1) Have you used drugs other than those required for medical reasons?  Yes      Jaleesa Arreguin PA-C

## 2022-04-20 NOTE — PROGRESS NOTES
Assessment/Plan:       Problem List Items Addressed This Visit        Endocrine    Hypertension complicating diabetes ([place] [address] )    Diabetes mellitus type 2 in nonobese ([place] [address] ) - Primary    Relevant Orders    POCT hemoglobin A1c (Completed)    Comprehensive metabolic panel    Microalbumin / creatinine urine ratio    Hyperlipidemia associated with type 2 diabetes mellitus ([place] [address] )    Relevant Orders    Lipid Panel with Direct LDL reflex       Cardiovascular and Mediastinum    Cardiomyopathy ([place] [address] )    Coronary artery disease involving native coronary artery of native heart with angina pectoris ([place] [address] )    Hypertensive heart disease without heart failure       Other    Medicare annual wellness visit, subsequent    Claudication of calf muscles ([place] [address] )    Cigarette nicotine dependence without complication    Relevant Orders    CT lung screening program      Other Visit Diagnoses     Colon cancer screening        Relevant Orders    Cologuard        DM well controlled, continue metformin and glimepiride  Continue ace/statin  BP well controlled  No CP, continue ASA, statin, plavix, BB, ACE  Continue with cardiology  Update CT lung screening  3 month follow up, earlier prn     Subjective:      Patient ID: Karlee Klein is a 77 y o  male  Pt presents for routine follow up    DM: a1c 5 9  on metformin and glimepiride  +ace/statin   HLD: on statin, due for FLP  HTN: well controlled on coreg, amlodipine, chlorthalidone, lisinopril  128/68  CAD: s/p TRAVIS x 1 in 11/2021  Follows with cardiology  On ASA, statin, plavix   No CP, SOB, HAYES, syncope    Due for CRC screening       The following portions of the patient's history were reviewed and updated as appropriate:   He  has a past medical history of Diabetes mellitus ([place] [address] ), Hyperlipidemia, Hyperosmolar non-ketotic state in patient with type 2 diabetes mellitus ([facility] ) (12/5/2019), Hypertension, and Type 2 diabetes mellitus with hyperglycemia, with long-term current use of insulin ([place] [address] ) (5/8/2020)  He   Patient Active Problem List    Diagnosis Date Noted    Hyperlipidemia associated with type 2 diabetes mellitus ([place] [address] ) 04/20/2022    Cigarette nicotine dependence without complication 28/49/2314    Arthritis of right knee 08/06/2021    Claudication of calf muscles ([place] [address] ) 08/06/2021    Hypertensive heart disease without heart failure 08/06/2021    Medicare annual wellness visit, subsequent 04/20/2021    Coronary artery disease involving native coronary artery of native heart with angina pectoris ([place] [address] ) 05/28/2020    Generalized anxiety disorder 05/28/2020    Cardiomyopathy ([place] [address] ) 05/11/2020    Tobacco abuse 05/10/2020    Chest pain 05/08/2020    Diabetes mellitus type 2 in nonobese ([place] [address] ) 05/08/2020    Hypertension complicating diabetes ([place] [address] ) 12/05/2019    Hyperosmolar hyponatremia 12/05/2019    Dyslipidemia 12/05/2019    Abnormal transaminases 12/05/2019    Leukocytosis 12/05/2019     He  has a past surgical history that includes Knee surgery (Right); Colonoscopy (2010); Cardiac catheterization (N/A, 11/11/2021); and Cardiac catheterization (Left, 11/11/2021)  His family history includes Dementia in his mother; Diabetes in his mother; Hyperlipidemia in his father and mother  He  reports that he has been smoking cigarettes  He has a 10 00 pack-year smoking history  He has never used smokeless tobacco  He reports current alcohol use  He reports that he does not use drugs    Current Outpatient Medications   Medication Sig Dispense Refill    amLODIPine (NORVASC) 10 mg tablet TAKE ONE TABLET BY MOUTH EVERY DAY 30 tablet 0    aspirin 81 mg chewable tablet Chew 1 tablet (81 mg total) daily 20 tablet 0    atorvastatin (LIPITOR) 40 mg tablet TAKE ONE TABLET BY MOUTH EVERY DAY WITH DINNER 30 tablet 2    busPIRone (BUSPAR) 10 mg tablet TAKE ONE TABLET BY MOUTH TWICE A DAY 60 tablet 5    carvedilol (COREG) 6 25 mg tablet TAKE ONE TABLET BY MOUTH TWICE A DAY WITH MEALS 60 tablet 0    chlorthalidone 25 mg tablet TAKE ONE TABLET BY MOUTH EVERY DAY 30 tablet 0    clopidogrel (Plavix) 75 mg tablet Take 1 tablet (75 mg total) by mouth daily 90 tablet 3    ferrous sulfate 324 (65 Fe) mg Take 1 tablet (324 mg total) by mouth 2 (two) times a day before meals (Patient not taking: Reported on 3/7/2022 ) 60 tablet 0    glimepiride (AMARYL) 2 mg tablet TAKE ONE TABLET BY MOUTH EVERY DAY WITH BREAKFAST 90 tablet 1    lisinopril (ZESTRIL) 40 mg tablet TAKE 1 TABLET BY MOUTH DAILY 90 tablet 2    metFORMIN (GLUCOPHAGE) 500 mg tablet Take 1 tablet (500 mg total) by mouth 2 (two) times a day with meals (Patient taking differently: Take 500 mg by mouth 2 (two) times a day with meals Pt reports taking 1 time daily ) 90 tablet 2    Multiple Vitamins-Minerals (MULTIVITAMIN WITH MINERALS) tablet Take 1 tablet by mouth daily       No current facility-administered medications for this visit       Current Outpatient Medications on File Prior to Visit   Medication Sig    amLODIPine (NORVASC) 10 mg tablet TAKE ONE TABLET BY MOUTH EVERY DAY    aspirin 81 mg chewable tablet Chew 1 tablet (81 mg total) daily    atorvastatin (LIPITOR) 40 mg tablet TAKE ONE TABLET BY MOUTH EVERY DAY WITH DINNER    busPIRone (BUSPAR) 10 mg tablet TAKE ONE TABLET BY MOUTH TWICE A DAY    carvedilol (COREG) 6 25 mg tablet TAKE ONE TABLET BY MOUTH TWICE A DAY WITH MEALS    chlorthalidone 25 mg tablet TAKE ONE TABLET BY MOUTH EVERY DAY    clopidogrel (Plavix) 75 mg tablet Take 1 tablet (75 mg total) by mouth daily    ferrous sulfate 324 (65 Fe) mg Take 1 tablet (324 mg total) by mouth 2 (two) times a day before meals (Patient not taking: Reported on 3/7/2022 )    glimepiride (AMARYL) 2 mg tablet TAKE ONE TABLET BY MOUTH EVERY DAY WITH BREAKFAST    lisinopril (ZESTRIL) 40 mg tablet TAKE 1 TABLET BY MOUTH DAILY    metFORMIN (GLUCOPHAGE) 500 mg tablet Take 1 tablet (500 mg total) by mouth 2 (two) times a day with meals (Patient taking differently: Take 500 mg by mouth 2 (two) times a day with meals Pt reports taking 1 time daily )    Multiple Vitamins-Minerals (MULTIVITAMIN WITH MINERALS) tablet Take 1 tablet by mouth daily    [DISCONTINUED] ascorbic acid (VITAMIN C) 500 MG tablet Take 1 tablet (500 mg total) by mouth 2 (two) times a day    [DISCONTINUED] folic acid (KP Folic Acid) 1 mg tablet Take 1 tablet (1 mg total) by mouth daily    [DISCONTINUED] gabapentin (NEURONTIN) 300 mg capsule Take 1 capsule (300 mg total) by mouth 3 (three) times a day Start by taking 1 tablet at bedtime for 3 days  Then increase to 1 tablet in the evening and 1 tablet at bedtime for 3 days  Then increase to 1 tablet in the morning, 1 tablet in the evening, and 1 tablet at bedtime thereafter  (Patient not taking: Reported on 9/28/2021)     No current facility-administered medications on file prior to visit  He has No Known Allergies       Review of Systems   Constitutional: Negative for chills, fatigue and fever  HENT: Negative for congestion, ear pain, hearing loss, nosebleeds, postnasal drip, rhinorrhea, sinus pressure, sinus pain, sneezing and sore throat  Eyes: Negative for pain, discharge, itching and visual disturbance  Respiratory: Negative for cough, chest tightness, shortness of breath and wheezing  Cardiovascular: Negative for chest pain, palpitations and leg swelling  Gastrointestinal: Negative for abdominal pain, blood in stool, constipation, diarrhea, nausea and vomiting  Genitourinary: Negative for dysuria, frequency, hematuria and urgency  Musculoskeletal: Negative for arthralgias and back pain  Skin: Negative for color change and rash  Neurological: Negative for dizziness, seizures, syncope, light-headedness and numbness  All other systems reviewed and are negative          Objective:      /68   Pulse 68   Temp (!) 96 8 °F (36 °C)   Ht 5' 7" (1 702 m)   Wt 74 8 kg (165 lb)   SpO2 98%   BMI 25 84 kg/m² Physical Exam  Vitals and nursing note reviewed  Constitutional:       General: He is not in acute distress  Appearance: Normal appearance  HENT:      Head: Normocephalic and atraumatic  Nose: Nose normal    Eyes:      Pupils: Pupils are equal, round, and reactive to light  Cardiovascular:      Rate and Rhythm: Normal rate and regular rhythm  Heart sounds: Murmur (2/6 RUSB) heard  Pulmonary:      Effort: Pulmonary effort is normal  No respiratory distress  Breath sounds: Normal breath sounds  No wheezing, rhonchi or rales  Musculoskeletal:         General: Normal range of motion  Cervical back: Normal range of motion and neck supple  Right lower leg: No edema  Left lower leg: No edema  Skin:     General: Skin is warm and dry  Neurological:      Mental Status: He is alert and oriented to person, place, and time  Psychiatric:         Mood and Affect: Mood and affect normal            BMI Counseling: Body mass index is 25 84 kg/m²  The BMI is above normal  Nutrition recommendations include reducing portion sizes, decreasing overall calorie intake, moderation in carbohydrate intake, increasing intake of lean protein, reducing intake of saturated fat and trans fat and reducing intake of cholesterol  Exercise recommendations include moderate aerobic physical activity for 150 minutes/week

## 2022-04-20 NOTE — PATIENT INSTRUCTIONS
Medicare Preventive Visit Patient Instructions  Thank you for completing your Welcome to Medicare Visit or Medicare Annual Wellness Visit today  Your next wellness visit will be due in one year (4/21/2023)  The screening/preventive services that you may require over the next 5-10 years are detailed below  Some tests may not apply to you based off risk factors and/or age  Screening tests ordered at today's visit but not completed yet may show as past due  Also, please note that scanned in results may not display below  Preventive Screenings:  Service Recommendations Previous Testing/Comments   Colorectal Cancer Screening  · Colonoscopy    · Fecal Occult Blood Test (FOBT)/Fecal Immunochemical Test (FIT)  · Fecal DNA/Cologuard Test  · Flexible Sigmoidoscopy Age: 54-65 years old   Colonoscopy: every 10 years (May be performed more frequently if at higher risk)  OR  FOBT/FIT: every 1 year  OR  Cologuard: every 3 years  OR  Sigmoidoscopy: every 5 years  Screening may be recommended earlier than age 48 if at higher risk for colorectal cancer  Also, an individualized decision between you and your healthcare provider will decide whether screening between the ages of 74-80 would be appropriate   Colonoscopy: Not on file  FOBT/FIT: Not on file  Cologuard: Not on file  Sigmoidoscopy: Not on file          Prostate Cancer Screening Individualized decision between patient and health care provider in men between ages of 53-78   Medicare will cover every 12 months beginning on the day after your 50th birthday PSA: 1 4 ng/mL     Screening Current     Hepatitis C Screening Once for adults born between 1945 and 1965  More frequently in patients at high risk for Hepatitis C Hep C Antibody: Not on file        Diabetes Screening 1-2 times per year if you're at risk for diabetes or have pre-diabetes Fasting glucose: 142 mg/dL   A1C: 5 7    Screening Not Indicated  History Diabetes   Cholesterol Screening Once every 5 years if you don't have a lipid disorder  May order more often based on risk factors  Lipid panel: 11/05/2021    Screening Not Indicated  History Lipid Disorder      Other Preventive Screenings Covered by Medicare:  1  Abdominal Aortic Aneurysm (AAA) Screening: covered once if your at risk  You're considered to be at risk if you have a family history of AAA or a male between the age of 73-68 who smoking at least 100 cigarettes in your lifetime  2  Lung Cancer Screening: covers low dose CT scan once per year if you meet all of the following conditions: (1) Age 50-69; (2) No signs or symptoms of lung cancer; (3) Current smoker or have quit smoking within the last 15 years; (4) You have a tobacco smoking history of at least 30 pack years (packs per day x number of years you smoked); (5) You get a written order from a healthcare provider  3  Glaucoma Screening: covered annually if you're considered high risk: (1) You have diabetes OR (2) Family history of glaucoma OR (3)  aged 48 and older OR (3)  American aged 72 and older  3  Osteoporosis Screening: covered every 2 years if you meet one of the following conditions: (1) Have a vertebral abnormality; (2) On glucocorticoid therapy for more than 3 months; (3) Have primary hyperparathyroidism; (4) On osteoporosis medications and need to assess response to drug therapy  5  HIV Screening: covered annually if you're between the age of 12-76  Also covered annually if you are younger than 13 and older than 72 with risk factors for HIV infection  For pregnant patients, it is covered up to 3 times per pregnancy      Immunizations:  Immunization Recommendations   Influenza Vaccine Annual influenza vaccination during flu season is recommended for all persons aged >= 6 months who do not have contraindications   Pneumococcal Vaccine (Prevnar and Pneumovax)  * Prevnar = PCV13  * Pneumovax = PPSV23 Adults 25-60 years old: 1-3 doses may be recommended based on certain risk factors  Adults 72 years old: Prevnar (PCV13) vaccine recommended followed by Pneumovax (PPSV23) vaccine  If already received PPSV23 since turning 65, then PCV13 recommended at least one year after PPSV23 dose  Hepatitis B Vaccine 3 dose series if at intermediate or high risk (ex: diabetes, end stage renal disease, liver disease)   Tetanus (Td) Vaccine - COST NOT COVERED BY MEDICARE PART B Following completion of primary series, a booster dose should be given every 10 years to maintain immunity against tetanus  Td may also be given as tetanus wound prophylaxis  Tdap Vaccine - COST NOT COVERED BY MEDICARE PART B Recommended at least once for all adults  For pregnant patients, recommended with each pregnancy  Shingles Vaccine (Shingrix) - COST NOT COVERED BY MEDICARE PART B  2 shot series recommended in those aged 48 and above     Health Maintenance Due:      Topic Date Due    Hepatitis C Screening  Never done    Colorectal Cancer Screening  Never done     Immunizations Due:      Topic Date Due    DTaP,Tdap,and Td Vaccines (2 - Td or Tdap) 08/11/2018     Advance Directives   What are advance directives? Advance directives are legal documents that state your wishes and plans for medical care  These plans are made ahead of time in case you lose your ability to make decisions for yourself  Advance directives can apply to any medical decision, such as the treatments you want, and if you want to donate organs  What are the types of advance directives? There are many types of advance directives, and each state has rules about how to use them  You may choose a combination of any of the following:  · Living will: This is a written record of the treatment you want  You can also choose which treatments you do not want, which to limit, and which to stop at a certain time  This includes surgery, medicine, IV fluid, and tube feedings  · Durable power of  for healthcare [place] [facility]):   This is a written record that states who you want to make healthcare choices for you when you are unable to make them for yourself  This person, called a proxy, is usually a family member or a friend  You may choose more than 1 proxy  · Do not resuscitate (DNR) order:  A DNR order is used in case your heart stops beating or you stop breathing  It is a request not to have certain forms of treatment, such as CPR  A DNR order may be included in other types of advance directives  · Medical directive: This covers the care that you want if you are in a coma, near death, or unable to make decisions for yourself  You can list the treatments you want for each condition  Treatment may include pain medicine, surgery, blood transfusions, dialysis, IV or tube feedings, and a ventilator (breathing machine)  · Values history: This document has questions about your views, beliefs, and how you feel and think about life  This information can help others choose the care that you would choose  Why are advance directives important? An advance directive helps you control your care  Although spoken wishes may be used, it is better to have your wishes written down  Spoken wishes can be misunderstood, or not followed  Treatments may be given even if you do not want them  An advance directive may make it easier for your family to make difficult choices about your care  Cigarette Smoking and Your Health   Risks to your health if you smoke:  Nicotine and other chemicals found in tobacco damage every cell in your body  Even if you are a light smoker, you have an increased risk for cancer, heart disease, and lung disease  If you are pregnant or have diabetes, smoking increases your risk for complications  Benefits to your health if you stop smoking:   · You decrease respiratory symptoms such as coughing, wheezing, and shortness of breath  · You reduce your risk for cancers of the lung, mouth, throat, kidney, bladder, pancreas, stomach, and cervix   If you already have cancer, you increase the benefits of chemotherapy  You also reduce your risk for cancer returning or a second cancer from developing  · You reduce your risk for heart disease, blood clots, heart attack, and stroke  · You reduce your risk for lung infections, and diseases such as pneumonia, asthma, chronic bronchitis, and emphysema  · Your circulation improves  More oxygen can be delivered to your body  If you have diabetes, you lower your risk for complications, such as kidney, artery, and eye diseases  You also lower your risk for nerve damage  Nerve damage can lead to amputations, poor vision, and blindness  · You improve your body's ability to heal and to fight infections  For more information and support to stop smoking:   · [employer]  Phone: 6- 372 - [phone]  Web Address: [employer]  Weight Management   Why it is important to manage your weight:  Being overweight increases your risk of health conditions such as heart disease, high blood pressure, type 2 diabetes, and certain types of cancer  It can also increase your risk for osteoarthritis, sleep apnea, and other respiratory problems  Aim for a slow, steady weight loss  Even a small amount of weight loss can lower your risk of health problems  How to lose weight safely:  A safe and healthy way to lose weight is to eat fewer calories and get regular exercise  You can lose up about 1 pound a week by decreasing the number of calories you eat by 500 calories each day  Healthy meal plan for weight management:  A healthy meal plan includes a variety of foods, contains fewer calories, and helps you stay healthy  A healthy meal plan includes the following:  · Eat whole-grain foods more often  A healthy meal plan should contain fiber  Fiber is the part of grains, fruits, and vegetables that is not broken down by your body  Whole-grain foods are healthy and provide extra fiber in your diet   Some examples of whole-grain foods are whole-wheat breads and pastas, oatmeal, brown rice, and bulgur  · Eat a variety of vegetables every day  Include dark, leafy greens such as spinach, kale, randee greens, and mustard greens  Eat yellow and orange vegetables such as carrots, sweet potatoes, and winter squash  · Eat a variety of fruits every day  Choose fresh or canned fruit (canned in its own juice or light syrup) instead of juice  Fruit juice has very little or no fiber  · Eat low-fat dairy foods  Drink fat-free (skim) milk or 1% milk  Eat fat-free yogurt and low-fat cottage cheese  Try low-fat cheeses such as mozzarella and other reduced-fat cheeses  · Choose meat and other protein foods that are low in fat  Choose beans or other legumes such as split peas or lentils  Choose fish, skinless poultry (chicken or turkey), or lean cuts of red meat (beef or pork)  Before you cook meat or poultry, cut off any visible fat  · Use less fat and oil  Try baking foods instead of frying them  Add less fat, such as margarine, sour cream, regular salad dressing and mayonnaise to foods  Eat fewer high-fat foods  Some examples of high-fat foods include french fries, doughnuts, ice cream, and cakes  · Eat fewer sweets  Limit foods and drinks that are high in sugar  This includes candy, cookies, regular soda, and sweetened drinks  Exercise:  Exercise at least 30 minutes per day on most days of the week  Some examples of exercise include walking, biking, dancing, and swimming  You can also fit in more physical activity by taking the stairs instead of the elevator or parking farther away from stores  Ask your healthcare provider about the best exercise plan for you  © Copyright [employer] 2018 Information is for End User's use only and may not be sold, redistributed or otherwise used for commercial purposes   All illustrations and images included in CareNotes® are the copyrighted property of [employer]  or [address] [place] [address] [employer]

## 2022-04-21 DIAGNOSIS — I10 RESISTANT HYPERTENSION: ICD-10-CM

## 2022-04-21 RX ORDER — CHLORTHALIDONE 25 MG/1
TABLET ORAL
Qty: 30 TABLET | Refills: 0 | Status: SHIPPED | OUTPATIENT
Start: 2022-04-21 | End: 2022-04-22

## 2022-04-22 DIAGNOSIS — I10 ESSENTIAL HYPERTENSION: ICD-10-CM

## 2022-04-22 DIAGNOSIS — E11.9 DIABETES MELLITUS TYPE 2 IN NONOBESE (HCC): ICD-10-CM

## 2022-04-22 DIAGNOSIS — I10 RESISTANT HYPERTENSION: ICD-10-CM

## 2022-04-22 RX ORDER — AMLODIPINE BESYLATE 10 MG/1
TABLET ORAL
Qty: 30 TABLET | Refills: 0 | Status: SHIPPED | OUTPATIENT
Start: 2022-04-22 | End: 2022-05-19

## 2022-04-22 RX ORDER — CHLORTHALIDONE 25 MG/1
TABLET ORAL
Qty: 30 TABLET | Refills: 0 | Status: SHIPPED | OUTPATIENT
Start: 2022-04-22 | End: 2022-05-19

## 2022-05-19 DIAGNOSIS — I10 RESISTANT HYPERTENSION: ICD-10-CM

## 2022-05-19 DIAGNOSIS — I10 ESSENTIAL HYPERTENSION: ICD-10-CM

## 2022-05-19 RX ORDER — CHLORTHALIDONE 25 MG/1
TABLET ORAL
Qty: 30 TABLET | Refills: 0 | Status: SHIPPED | OUTPATIENT
Start: 2022-05-19 | End: 2022-06-22

## 2022-05-19 RX ORDER — AMLODIPINE BESYLATE 10 MG/1
TABLET ORAL
Qty: 30 TABLET | Refills: 0 | Status: SHIPPED | OUTPATIENT
Start: 2022-05-19 | End: 2022-06-14

## 2022-06-14 DIAGNOSIS — F41.1 GENERALIZED ANXIETY DISORDER: ICD-10-CM

## 2022-06-14 DIAGNOSIS — I10 ESSENTIAL HYPERTENSION: ICD-10-CM

## 2022-06-14 RX ORDER — BUSPIRONE HYDROCHLORIDE 10 MG/1
TABLET ORAL
Qty: 60 TABLET | Refills: 5 | Status: SHIPPED | OUTPATIENT
Start: 2022-06-14

## 2022-06-14 RX ORDER — AMLODIPINE BESYLATE 10 MG/1
TABLET ORAL
Qty: 30 TABLET | Refills: 0 | Status: SHIPPED | OUTPATIENT
Start: 2022-06-14 | End: 2022-07-11

## 2022-06-22 DIAGNOSIS — I10 RESISTANT HYPERTENSION: ICD-10-CM

## 2022-06-22 RX ORDER — CHLORTHALIDONE 25 MG/1
TABLET ORAL
Qty: 30 TABLET | Refills: 0 | Status: SHIPPED | OUTPATIENT
Start: 2022-06-22 | End: 2022-07-11

## 2022-07-05 DIAGNOSIS — E78.5 DYSLIPIDEMIA: ICD-10-CM

## 2022-07-05 RX ORDER — ATORVASTATIN CALCIUM 40 MG/1
TABLET, FILM COATED ORAL
Qty: 30 TABLET | Refills: 2 | Status: SHIPPED | OUTPATIENT
Start: 2022-07-05 | End: 2022-10-03

## 2022-07-28 DIAGNOSIS — E11.00 HYPEROSMOLAR NON-KETOTIC STATE IN PATIENT WITH TYPE 2 DIABETES MELLITUS (HCC): ICD-10-CM

## 2022-07-28 RX ORDER — GLIMEPIRIDE 2 MG/1
TABLET ORAL
Qty: 90 TABLET | Refills: 1 | Status: SHIPPED | OUTPATIENT
Start: 2022-07-28

## 2022-09-02 DIAGNOSIS — I10 ESSENTIAL HYPERTENSION: ICD-10-CM

## 2022-09-02 DIAGNOSIS — E11.9 DIABETES MELLITUS TYPE 2 IN NONOBESE (HCC): ICD-10-CM

## 2022-09-02 RX ORDER — CARVEDILOL 6.25 MG/1
TABLET ORAL
Qty: 60 TABLET | Refills: 3 | Status: SHIPPED | OUTPATIENT
Start: 2022-09-02 | End: 2022-09-06

## 2022-09-05 DIAGNOSIS — I10 ESSENTIAL HYPERTENSION: ICD-10-CM

## 2022-09-06 RX ORDER — CARVEDILOL 6.25 MG/1
TABLET ORAL
Qty: 60 TABLET | Refills: 3 | Status: SHIPPED | OUTPATIENT
Start: 2022-09-06

## 2022-10-03 DIAGNOSIS — E78.5 DYSLIPIDEMIA: ICD-10-CM

## 2022-10-03 RX ORDER — ATORVASTATIN CALCIUM 40 MG/1
TABLET, FILM COATED ORAL
Qty: 30 TABLET | Refills: 2 | Status: SHIPPED | OUTPATIENT
Start: 2022-10-03

## 2022-10-12 PROBLEM — Z00.00 MEDICARE ANNUAL WELLNESS VISIT, SUBSEQUENT: Status: RESOLVED | Noted: 2021-04-20 | Resolved: 2022-10-12

## 2022-10-14 DIAGNOSIS — E11.9 DIABETES MELLITUS TYPE 2 IN NONOBESE (HCC): ICD-10-CM

## 2022-10-30 DIAGNOSIS — I25.119 CORONARY ARTERY DISEASE INVOLVING NATIVE CORONARY ARTERY OF NATIVE HEART WITH ANGINA PECTORIS (HCC): ICD-10-CM

## 2022-10-31 RX ORDER — CLOPIDOGREL BISULFATE 75 MG/1
TABLET ORAL
Qty: 90 TABLET | Refills: 3 | Status: SHIPPED | OUTPATIENT
Start: 2022-10-31

## 2022-10-31 NOTE — TELEPHONE ENCOUNTER
Clopidogrl Bisulfate 75mg tabs    Dispense:90 Refills:3      Hematology: Antiplatelets - clopidogrel Failed 10/30/2022 06:21 AM   Protocol Details  HCT in normal range and within 180 days    HGB in normal range and within 180 days    PLT in normal range and within 180 days    Valid encounter within last 6 months

## 2023-01-02 DIAGNOSIS — I10 ESSENTIAL HYPERTENSION: ICD-10-CM

## 2023-01-02 DIAGNOSIS — E78.5 DYSLIPIDEMIA: ICD-10-CM

## 2023-01-02 DIAGNOSIS — I10 RESISTANT HYPERTENSION: ICD-10-CM

## 2023-01-03 DIAGNOSIS — I10 ESSENTIAL HYPERTENSION: ICD-10-CM

## 2023-01-03 RX ORDER — CHLORTHALIDONE 25 MG/1
TABLET ORAL
Qty: 90 TABLET | Refills: 1 | Status: SHIPPED | OUTPATIENT
Start: 2023-01-03

## 2023-01-03 RX ORDER — ATORVASTATIN CALCIUM 40 MG/1
TABLET, FILM COATED ORAL
Qty: 30 TABLET | Refills: 2 | Status: SHIPPED | OUTPATIENT
Start: 2023-01-03

## 2023-01-03 RX ORDER — CARVEDILOL 6.25 MG/1
TABLET ORAL
Qty: 60 TABLET | Refills: 3 | Status: SHIPPED | OUTPATIENT
Start: 2023-01-03

## 2023-01-03 RX ORDER — AMLODIPINE BESYLATE 10 MG/1
10 TABLET ORAL DAILY
Qty: 90 TABLET | Refills: 1 | Status: SHIPPED | OUTPATIENT
Start: 2023-01-03

## 2023-01-24 ENCOUNTER — OFFICE VISIT (OUTPATIENT)
Dept: CARDIOLOGY CLINIC | Facility: CLINIC | Age: 68
End: 2023-01-24

## 2023-01-24 VITALS
WEIGHT: 169 LBS | OXYGEN SATURATION: 99 % | HEART RATE: 79 BPM | BODY MASS INDEX: 26.53 KG/M2 | SYSTOLIC BLOOD PRESSURE: 140 MMHG | RESPIRATION RATE: 16 BRPM | HEIGHT: 67 IN | DIASTOLIC BLOOD PRESSURE: 88 MMHG

## 2023-01-24 DIAGNOSIS — I25.119 CORONARY ARTERY DISEASE INVOLVING NATIVE CORONARY ARTERY OF NATIVE HEART WITH ANGINA PECTORIS (HCC): ICD-10-CM

## 2023-01-24 DIAGNOSIS — I42.9 CARDIOMYOPATHY, UNSPECIFIED TYPE (HCC): ICD-10-CM

## 2023-01-24 DIAGNOSIS — I15.2 HYPERTENSION COMPLICATING DIABETES (HCC): ICD-10-CM

## 2023-01-24 DIAGNOSIS — I73.9 CLAUDICATION OF CALF MUSCLES (HCC): ICD-10-CM

## 2023-01-24 DIAGNOSIS — I10 PRIMARY HYPERTENSION: Primary | ICD-10-CM

## 2023-01-24 DIAGNOSIS — E11.59 HYPERTENSION COMPLICATING DIABETES (HCC): ICD-10-CM

## 2023-01-24 RX ORDER — CARVEDILOL 12.5 MG/1
12.5 TABLET ORAL 2 TIMES DAILY WITH MEALS
Qty: 180 TABLET | Refills: 4 | Status: SHIPPED | OUTPATIENT
Start: 2023-01-24 | End: 2023-02-03 | Stop reason: SDUPTHER

## 2023-01-24 NOTE — PROGRESS NOTES
Cardiology Follow Up    John Agee  1955  [id number]  [facility] [place] [address] [address]  [phone]        Interval History: 42-year-old male with coronary artery disease status post PCI of LP L1 on 11/11/2021 with drug-eluting stent x1, hypertension, hyperlipidemia, type 2 diabetes mellitus, JOELLE, smoker, left bundle-branch block presented for cardiology follow-up  Stress was done preop knee surgery and lead to the stent  He is still limted by knee pain  Previously saw Dr Fredo Ludwig      Patient Active Problem List   Diagnosis   • Hypertension complicating diabetes ([facility] )   • Hyperosmolar hyponatremia   • Dyslipidemia   • Abnormal transaminases   • Leukocytosis   • Chest pain   • Diabetes mellitus type 2 in nonobese (HCC)   • Tobacco abuse   • Cardiomyopathy ([facility] )   • Coronary artery disease involving native coronary artery of native heart with angina pectoris ([facility])   • Generalized anxiety disorder   • Arthritis of right knee   • Claudication of calf muscles ([facility])   • Hypertensive heart disease without heart failure   • Hyperlipidemia associated with type 2 diabetes mellitus ([address] )   • Cigarette nicotine dependence without complication     Past Medical History:   Diagnosis Date   • Diabetes mellitus ([address] )    • Hyperlipidemia    • Hyperosmolar non-ketotic state in patient with type 2 diabetes mellitus ([address] ) 12/5/2019   • Hypertension    • Type 2 diabetes mellitus with hyperglycemia, with long-term current use of insulin ([address] ) 5/8/2020     Social History     Socioeconomic History   • Marital status: /Civil Union     Spouse name: Not on file   • Number of children: Not on file   • Years of education: Not on file   • Highest education level: Not on file   Occupational History   • Not on file   Tobacco Use   • Smoking status: Light Smoker     Packs/day: 0 25     Years: 40 00 Pack years: 10 00     Types: Cigarettes   • Smokeless tobacco: Never   Vaping Use   • Vaping Use: Never used   Substance and Sexual Activity   • Alcohol use: Yes     Alcohol/week: 0 0 standard drinks     Comment: occasionally   • Drug use: Never   • Sexual activity: Not Currently   Other Topics Concern   • Not on file   Social History Narrative   • Not on file     Social Determinants of Health     Financial Resource Strain: Not on file   Food Insecurity: Not on file   Transportation Needs: Not on file   Physical Activity: Not on file   Stress: Not on file   Social Connections: Not on file   Intimate Partner Violence: Not on file   Housing Stability: Not on file      Family History   Problem Relation Age of Onset   • Diabetes Mother    • Hyperlipidemia Mother    • Dementia Mother    • Hyperlipidemia Father      Past Surgical History:   Procedure Laterality Date   • CARDIAC CATHETERIZATION N/A 11/11/2021    Procedure: Cardiac catheterization;  Surgeon: Jeremiah Angel MD;  Location: [address] [place] [address] CATH LAB; Service: Cardiology   • CARDIAC CATHETERIZATION Left 11/11/2021    Procedure: Cardiac Coronary Angiogram;  Surgeon: Jeremiah Angel MD;  Location: [address] [place] [address] CATH LAB;   Service: Cardiology   • COLONOSCOPY  2010   • KNEE SURGERY Right        Current Outpatient Medications:   •  amLODIPine (NORVASC) 10 mg tablet, Take 1 tablet (10 mg total) by mouth daily, Disp: 90 tablet, Rfl: 1  •  aspirin 81 mg chewable tablet, Chew 1 tablet (81 mg total) daily, Disp: 20 tablet, Rfl: 0  •  atorvastatin (LIPITOR) 40 mg tablet, TAKE ONE TABLET BY MOUTH EVERY DAY WITH DINNER, Disp: 30 tablet, Rfl: 2  •  busPIRone (BUSPAR) 10 mg tablet, TAKE ONE TABLET BY MOUTH TWICE A DAY, Disp: 60 tablet, Rfl: 5  •  carvedilol (COREG) 12 5 mg tablet, Take 1 tablet (12 5 mg total) by mouth 2 (two) times a day with meals, Disp: 180 tablet, Rfl: 4  •  chlorthalidone 25 mg tablet, TAKE ONE TABLET BY MOUTH EVERY DAY, Disp: 90 tablet, Rfl: 1  • glimepiride (AMARYL) 2 mg tablet, TAKE ONE TABLET BY MOUTH EVERY DAY WITH BREAKFAST, Disp: 90 tablet, Rfl: 1  •  lisinopril (ZESTRIL) 40 mg tablet, TAKE ONE TABLET BY MOUTH EVERY DAY, Disp: 30 tablet, Rfl: 0  •  metFORMIN (GLUCOPHAGE) 500 mg tablet, TAKE ONE TABLET BY MOUTH TWICE A DAY WITH MEALS, Disp: 90 tablet, Rfl: 2  •  Multiple Vitamins-Minerals (MULTIVITAMIN WITH MINERALS) tablet, Take 1 tablet by mouth daily, Disp: , Rfl:   •  ferrous sulfate 324 (65 Fe) mg, Take 1 tablet (324 mg total) by mouth 2 (two) times a day before meals (Patient not taking: Reported on 3/7/2022 ), Disp: 60 tablet, Rfl: 0  No Known Allergies    Labs:  No visits with results within 2 Month(s) from this visit  Latest known visit with results is:   Office Visit on 04/20/2022   Component Date Value   • Hemoglobin A1C 04/20/2022 5 9      Imaging: No results found  Review of Systems:  Review of Systems review of systems is negative    Physical Exam:  Physical Exam GEN: Alert and oriented x 3, in no acute distress  Well appearing and well nourished  HEENT: Sclera anicteric, conjunctivae pink, mucous membranes moist  Oropharynx clear  NECK: Supple, no carotid bruits, no significant JVD  Trachea midline, no thyromegaly  HEART: Regular rhythm, normal S1 and S2, 2/6 systolic murmur, clicks, gallops or rubs  PMI nondisplaced, no thrills  LUNGS: Clear to auscultation bilaterally; no wheezes, rales, or rhonchi  No increased work of breathing or signs of respiratory distress  ABDOMEN: Soft, nontender, nondistended, normoactive bowel sounds  EXTREMITIES: Skin warm and well perfused, no clubbing, cyanosis, or edema  NEURO: No focal findings  Normal speech  Mood and affect normal    SKIN: Normal without suspicious lesions on exposed skin        CAD status post coronary stenting  Hypertension  Hyperlipidemia  Type 2 diabetes  Smoker  Left bundle branch block    Discussion/Summary: Continue aggressive medical therapy and risk factor reduction  Continue dual antiplatelet therapy for now, discussed with patient  Continue amlodipine, carvedilol, chlorthalidone, lisinopril and diabetic meds  Smoking cessation advised  Exercise program recommended    Cholesterol is excellent and BP near goal

## 2023-02-03 ENCOUNTER — OFFICE VISIT (OUTPATIENT)
Dept: FAMILY MEDICINE CLINIC | Facility: CLINIC | Age: 68
End: 2023-02-03

## 2023-02-03 VITALS
OXYGEN SATURATION: 97 % | WEIGHT: 170 LBS | SYSTOLIC BLOOD PRESSURE: 138 MMHG | HEIGHT: 67 IN | TEMPERATURE: 98.5 F | DIASTOLIC BLOOD PRESSURE: 80 MMHG | BODY MASS INDEX: 26.68 KG/M2 | HEART RATE: 75 BPM

## 2023-02-03 DIAGNOSIS — N18.30 STAGE 3 CHRONIC KIDNEY DISEASE, UNSPECIFIED WHETHER STAGE 3A OR 3B CKD (HCC): ICD-10-CM

## 2023-02-03 DIAGNOSIS — E11.00 HYPEROSMOLAR NON-KETOTIC STATE IN PATIENT WITH TYPE 2 DIABETES MELLITUS (HCC): ICD-10-CM

## 2023-02-03 DIAGNOSIS — I11.9 HYPERTENSIVE HEART DISEASE WITHOUT HEART FAILURE: ICD-10-CM

## 2023-02-03 DIAGNOSIS — E11.69 ONYCHOMYCOSIS OF MULTIPLE TOENAILS WITH TYPE 2 DIABETES MELLITUS (HCC): ICD-10-CM

## 2023-02-03 DIAGNOSIS — F41.1 GENERALIZED ANXIETY DISORDER: ICD-10-CM

## 2023-02-03 DIAGNOSIS — I10 PRIMARY HYPERTENSION: ICD-10-CM

## 2023-02-03 DIAGNOSIS — I42.9 CARDIOMYOPATHY, UNSPECIFIED TYPE (HCC): ICD-10-CM

## 2023-02-03 DIAGNOSIS — I15.2 HYPERTENSION COMPLICATING DIABETES (HCC): ICD-10-CM

## 2023-02-03 DIAGNOSIS — I73.9 CLAUDICATION OF CALF MUSCLES (HCC): ICD-10-CM

## 2023-02-03 DIAGNOSIS — I10 ESSENTIAL HYPERTENSION: ICD-10-CM

## 2023-02-03 DIAGNOSIS — E78.5 HYPERLIPIDEMIA ASSOCIATED WITH TYPE 2 DIABETES MELLITUS (HCC): ICD-10-CM

## 2023-02-03 DIAGNOSIS — I25.119 CORONARY ARTERY DISEASE INVOLVING NATIVE CORONARY ARTERY OF NATIVE HEART WITH ANGINA PECTORIS (HCC): ICD-10-CM

## 2023-02-03 DIAGNOSIS — E11.69 HYPERLIPIDEMIA ASSOCIATED WITH TYPE 2 DIABETES MELLITUS (HCC): ICD-10-CM

## 2023-02-03 DIAGNOSIS — R42 VERTIGO: ICD-10-CM

## 2023-02-03 DIAGNOSIS — I10 RESISTANT HYPERTENSION: ICD-10-CM

## 2023-02-03 DIAGNOSIS — E78.5 DYSLIPIDEMIA: ICD-10-CM

## 2023-02-03 DIAGNOSIS — Z12.11 COLON CANCER SCREENING: ICD-10-CM

## 2023-02-03 DIAGNOSIS — B35.1 ONYCHOMYCOSIS OF MULTIPLE TOENAILS WITH TYPE 2 DIABETES MELLITUS (HCC): ICD-10-CM

## 2023-02-03 DIAGNOSIS — E11.9 DIABETES MELLITUS TYPE 2 IN NONOBESE (HCC): Primary | ICD-10-CM

## 2023-02-03 DIAGNOSIS — E11.59 HYPERTENSION COMPLICATING DIABETES (HCC): ICD-10-CM

## 2023-02-03 DIAGNOSIS — F17.210 CIGARETTE NICOTINE DEPENDENCE WITHOUT COMPLICATION: ICD-10-CM

## 2023-02-03 LAB
LEFT EYE DIABETIC RETINOPATHY: NORMAL
LEFT EYE IMAGE QUALITY: NORMAL
LEFT EYE MACULAR EDEMA: NORMAL
LEFT EYE OTHER RETINOPATHY: NORMAL
RIGHT EYE DIABETIC RETINOPATHY: NORMAL
RIGHT EYE IMAGE QUALITY: NORMAL
RIGHT EYE MACULAR EDEMA: NORMAL
RIGHT EYE OTHER RETINOPATHY: NORMAL
SEVERITY (EYE EXAM): NORMAL
SL AMB POCT HEMOGLOBIN AIC: 6 (ref ?–6.5)

## 2023-02-03 RX ORDER — CARVEDILOL 12.5 MG/1
12.5 TABLET ORAL 2 TIMES DAILY WITH MEALS
Qty: 180 TABLET | Refills: 4 | Status: SHIPPED | OUTPATIENT
Start: 2023-02-03

## 2023-02-03 RX ORDER — ATORVASTATIN CALCIUM 40 MG/1
40 TABLET, FILM COATED ORAL DAILY
Qty: 90 TABLET | Refills: 1 | Status: SHIPPED | OUTPATIENT
Start: 2023-02-03 | End: 2023-08-02

## 2023-02-03 RX ORDER — AMLODIPINE BESYLATE 10 MG/1
10 TABLET ORAL DAILY
Qty: 90 TABLET | Refills: 1 | Status: SHIPPED | OUTPATIENT
Start: 2023-02-03

## 2023-02-03 RX ORDER — BUSPIRONE HYDROCHLORIDE 10 MG/1
10 TABLET ORAL 2 TIMES DAILY
Qty: 60 TABLET | Refills: 5 | Status: SHIPPED | OUTPATIENT
Start: 2023-02-03

## 2023-02-03 RX ORDER — TERBINAFINE HYDROCHLORIDE 250 MG/1
250 TABLET ORAL DAILY
Qty: 84 TABLET | Refills: 0 | Status: SHIPPED | OUTPATIENT
Start: 2023-02-03 | End: 2023-04-28

## 2023-02-03 RX ORDER — GLIMEPIRIDE 2 MG/1
2 TABLET ORAL
Qty: 90 TABLET | Refills: 1 | Status: SHIPPED | OUTPATIENT
Start: 2023-02-03

## 2023-02-03 RX ORDER — LISINOPRIL 40 MG/1
40 TABLET ORAL DAILY
Qty: 30 TABLET | Refills: 0 | Status: SHIPPED | OUTPATIENT
Start: 2023-02-03

## 2023-02-03 RX ORDER — ASPIRIN 81 MG/1
81 TABLET, CHEWABLE ORAL DAILY
Qty: 90 TABLET | Refills: 1 | Status: SHIPPED | OUTPATIENT
Start: 2023-02-03 | End: 2023-08-02

## 2023-02-03 RX ORDER — CHLORTHALIDONE 25 MG/1
25 TABLET ORAL DAILY
Qty: 90 TABLET | Refills: 1 | Status: SHIPPED | OUTPATIENT
Start: 2023-02-03

## 2023-02-03 NOTE — PROGRESS NOTES
Name: Leona Lopez      : 1955      MRN: [id number]  Encounter Provider: June Chan PA-C  Encounter Date: 2/3/2023   Encounter department: [address] [place] [address]     1  Diabetes mellitus type 2 in nonobese (HCC)  -     IRIS Diabetic eye exam  -     POCT hemoglobin A1c  -     metFORMIN (GLUCOPHAGE) 500 mg tablet; Take 1 tablet (500 mg total) by mouth 2 (two) times a day with meals  -     Hemoglobin A1C; Future; Expected date: 2023  -     Comprehensive metabolic panel; Future; Expected date: 2023  -     CBC and differential; Future; Expected date: 2023  -     Microalbumin / creatinine urine ratio  -     Microalbumin / creatinine urine ratio; Future; Expected date: 2023    2  Hyperosmolar non-ketotic state in patient with type 2 diabetes mellitus (HCC)  -     glimepiride (AMARYL) 2 mg tablet; Take 1 tablet (2 mg total) by mouth daily with breakfast    3  Generalized anxiety disorder  -     busPIRone (BUSPAR) 10 mg tablet; Take 1 tablet (10 mg total) by mouth 2 (two) times a day    4  Essential hypertension  -     amLODIPine (NORVASC) 10 mg tablet; Take 1 tablet (10 mg total) by mouth daily  -     lisinopril (ZESTRIL) 40 mg tablet; Take 1 tablet (40 mg total) by mouth daily    5  Vertigo  -     aspirin 81 mg chewable tablet; Chew 1 tablet (81 mg total) daily    6  Dyslipidemia  -     atorvastatin (LIPITOR) 40 mg tablet; Take 1 tablet (40 mg total) by mouth daily    7  Primary hypertension  -     carvedilol (COREG) 12 5 mg tablet; Take 1 tablet (12 5 mg total) by mouth 2 (two) times a day with meals    8  Resistant hypertension  -     chlorthalidone 25 mg tablet; Take 1 tablet (25 mg total) by mouth daily    9  Claudication of calf muscles ([facility])  -     VAS lower limb arterial duplex, complete bilateral; Future; Expected date: 2023    10  Stage 3 chronic kidney disease, unspecified whether stage 3a or 3b CKD ([facility] [address] )    11   Hyperlipidemia associated with type 2 diabetes mellitus ([place] [address] )  -     Lipid Panel with Direct LDL reflex; Future; Expected date: 02/03/2023    12  Hypertension complicating diabetes ([place] [address] )    13  Cardiomyopathy, unspecified type ([place] [address] )    14  Coronary artery disease involving native coronary artery of native heart with angina pectoris ([place] [address] )    15  Hypertensive heart disease without heart failure    16  Cigarette nicotine dependence without complication    17  Colon cancer screening  -     Ambulatory referral for colonoscopy; Future    18  Onychomycosis of multiple toenails with type 2 diabetes mellitus (HCC)  -     terbinafine (LamISIL) 250 mg tablet; Take 1 tablet (250 mg total) by mouth daily  DM well controlled, continue current regimen  Lamisil for onychomycosis  Foot, eye exams today, microalbumin  BP borderline, continue to monitor, continue same regimen  Begin daily ASA, pt no longer on plavix s/p PCI  Update labs, HM  No cardiac complaints, continue with cardiology  Needs dentistry follow up    3 month follow up with labs  Subjective     Pt presents for routine follow up     DM: a1c 6 0  on metformin and glimepiride  +ace/statin  HLD: on statin, due for FLP  HTN: borderline controlled on coreg, amlodipine, chlorthalidone, lisinopril  138/80  CAD: s/p TRAVIS x 1 in 11/2021  Follows with cardiology  On ASA, statin, plavix  No CP, SOB, HAYES, syncope    Does not pain in both legs when walking, mostly the calf region  Did not complete previously ordered arterial study  Continues to smoke  He has very poor dentition and is looking for a dentist  No fevers       Due for CRC screening     Review of Systems   Constitutional: Negative for chills, fatigue and fever  HENT: Negative for congestion, ear pain, hearing loss, nosebleeds, postnasal drip, rhinorrhea, sinus pressure, sinus pain, sneezing and sore throat  Eyes: Negative for pain, discharge, itching and visual disturbance     Respiratory: Negative for cough, chest tightness, shortness of breath and wheezing  Cardiovascular: Negative for chest pain, palpitations and leg swelling  Gastrointestinal: Negative for abdominal pain, blood in stool, constipation, diarrhea, nausea and vomiting  Genitourinary: Negative for frequency and urgency  Musculoskeletal: Positive for myalgias  Neurological: Negative for dizziness, light-headedness and numbness  Past Medical History:   Diagnosis Date   • Anxiety    • Arthritis    • Diabetes mellitus ([place] [id number] )    • Hyperlipidemia    • Hyperosmolar non-ketotic state in patient with type 2 diabetes mellitus ([place] [address] ) 12/05/2019   • Hypertension    • Type 2 diabetes mellitus with hyperglycemia, with long-term current use of insulin ([place] [address] ) 05/08/2020     Past Surgical History:   Procedure Laterality Date   • CARDIAC CATHETERIZATION N/A 11/11/2021    Procedure: Cardiac catheterization;  Surgeon: Ricarda Ozuna MD;  Location: [address] [address] CATH LAB; Service: Cardiology   • CARDIAC CATHETERIZATION Left 11/11/2021    Procedure: Cardiac Coronary Angiogram;  Surgeon: Ricarda Ozuna MD;  Location: [address] [place] [address] CATH LAB;   Service: Cardiology   • COLONOSCOPY  2010   • KNEE SURGERY Right      Family History   Problem Relation Age of Onset   • Diabetes Mother    • Hyperlipidemia Mother    • Dementia Mother    • Hyperlipidemia Father      Social History     Socioeconomic History   • Marital status: /Civil Union     Spouse name: None   • Number of children: None   • Years of education: None   • Highest education level: None   Occupational History   • None   Tobacco Use   • Smoking status: Light Smoker     Packs/day: 0 50     Years: 40 00     Pack years: 20 00     Types: Cigarettes   • Smokeless tobacco: Never   Vaping Use   • Vaping Use: Never used   Substance and Sexual Activity   • Alcohol use: Yes     Comment: occasionally   • Drug use: Never   • Sexual activity: Not Currently     Partners: Female     Birth control/protection: None   Other Topics Concern • None   Social History Narrative   • None     Social Determinants of Health     Financial Resource Strain: Not on file   Food Insecurity: Not on file   Transportation Needs: Not on file   Physical Activity: Not on file   Stress: Not on file   Social Connections: Not on file   Intimate Partner Violence: Not on file   Housing Stability: Not on file     Current Outpatient Medications on File Prior to Visit   Medication Sig   • Multiple Vitamins-Minerals (MULTIVITAMIN WITH MINERALS) tablet Take 1 tablet by mouth daily   • [DISCONTINUED] amLODIPine (NORVASC) 10 mg tablet Take 1 tablet (10 mg total) by mouth daily   • [DISCONTINUED] aspirin 81 mg chewable tablet Chew 1 tablet (81 mg total) daily   • [DISCONTINUED] atorvastatin (LIPITOR) 40 mg tablet TAKE ONE TABLET BY MOUTH EVERY DAY WITH DINNER   • [DISCONTINUED] busPIRone (BUSPAR) 10 mg tablet TAKE ONE TABLET BY MOUTH TWICE A DAY   • [DISCONTINUED] carvedilol (COREG) 12 5 mg tablet Take 1 tablet (12 5 mg total) by mouth 2 (two) times a day with meals   • [DISCONTINUED] chlorthalidone 25 mg tablet TAKE ONE TABLET BY MOUTH EVERY DAY   • [DISCONTINUED] glimepiride (AMARYL) 2 mg tablet TAKE ONE TABLET BY MOUTH EVERY DAY WITH BREAKFAST   • [DISCONTINUED] lisinopril (ZESTRIL) 40 mg tablet TAKE ONE TABLET BY MOUTH EVERY DAY   • [DISCONTINUED] metFORMIN (GLUCOPHAGE) 500 mg tablet TAKE ONE TABLET BY MOUTH TWICE A DAY WITH MEALS   • [DISCONTINUED] ferrous sulfate 324 (65 Fe) mg Take 1 tablet (324 mg total) by mouth 2 (two) times a day before meals (Patient not taking: Reported on 3/7/2022 )     No Known Allergies  Immunization History   Administered Date(s) Administered   • COVID-19 MODERNA VACC 0 5 ML IM 04/16/2021, 05/14/2021, 11/15/2021   • COVID-19 Moderna Vac BIVALENT 12 Yr+ IM (BOOSTER ONLY) 0 5 ML 10/09/2022   • Hep B, Adolescent or Pediatric 08/11/2008   • Hep B, adult 10/13/2008   • INFLUENZA 12/20/2022   • Influenza, recombinant, quadrivalent,injectable, preservative free 12/05/2019, 08/31/2020   • Pneumococcal Polysaccharide PPV23 12/08/2019   • Tdap 08/11/2008       Objective     /80   Pulse 75   Temp 98 5 °F (36 9 °C)   Ht 5' 7" (1 702 m)   Wt 77 1 kg (170 lb)   SpO2 97%   BMI 26 63 kg/m²     Physical Exam  Vitals and nursing note reviewed  Constitutional:       General: He is not in acute distress  Appearance: Normal appearance  HENT:      Head: Normocephalic and atraumatic  Nose: Nose normal       Mouth/Throat:      Mouth: Mucous membranes are moist       Pharynx: Oropharynx is clear  No oropharyngeal exudate or posterior oropharyngeal erythema  Eyes:      Pupils: Pupils are equal, round, and reactive to light  Cardiovascular:      Rate and Rhythm: Normal rate and regular rhythm  Pulses: no weak pulses          Dorsalis pedis pulses are 2+ on the right side and 2+ on the left side  Posterior tibial pulses are 2+ on the right side and 2+ on the left side  Heart sounds: Normal heart sounds  Pulmonary:      Effort: Pulmonary effort is normal  No respiratory distress  Breath sounds: Normal breath sounds  No wheezing, rhonchi or rales  Abdominal:      General: Bowel sounds are normal  There is no distension  Palpations: Abdomen is soft  Tenderness: There is no abdominal tenderness  There is no guarding  Musculoskeletal:         General: Normal range of motion  Cervical back: Normal range of motion and neck supple  Right lower leg: No edema  Left lower leg: No edema  Feet:      Right foot:      Skin integrity: No ulcer, skin breakdown, erythema, warmth, callus or dry skin  Left foot:      Skin integrity: No ulcer, skin breakdown, erythema, warmth, callus or dry skin  Skin:     General: Skin is warm and dry  Neurological:      Mental Status: He is alert and oriented to person, place, and time     Psychiatric:         Mood and Affect: Mood and affect normal      Diabetic Foot Exam    Patient's shoes and socks removed  Right Foot/Ankle   Right Foot Inspection  Skin Exam: skin normal and skin intact  No dry skin, no warmth, no callus, no erythema, no maceration, no abnormal color, no pre-ulcer, no ulcer and no callus  Toe Exam: ROM and strength within normal limits  Sensory   Vibration: intact  Proprioception: intact  Monofilament testing: intact    Vascular  Capillary refills: < 3 seconds  The right DP pulse is 2+  The right PT pulse is 2+  Left Foot/Ankle  Left Foot Inspection  Skin Exam: skin normal and skin intact  No dry skin, no warmth, no erythema, no maceration, normal color, no pre-ulcer, no ulcer and no callus  Toe Exam: ROM and strength within normal limits  Sensory   Vibration: intact  Proprioception: intact  Monofilament testing: intact    Vascular  Capillary refills: < 3 seconds  The left DP pulse is 2+  The left PT pulse is 2+       Assign Risk Category  No deformity present  No loss of protective sensation  No weak pulses  Risk: 2    Tim Solis PA-C

## 2023-02-26 ENCOUNTER — APPOINTMENT (EMERGENCY)
Dept: RADIOLOGY | Facility: HOSPITAL | Age: 68
End: 2023-02-26

## 2023-02-26 ENCOUNTER — HOSPITAL ENCOUNTER (OUTPATIENT)
Facility: HOSPITAL | Age: 68
Setting detail: OBSERVATION
Discharge: HOME | End: 2023-02-27
Attending: EMERGENCY MEDICINE | Admitting: INTERNAL MEDICINE

## 2023-02-26 ENCOUNTER — APPOINTMENT (EMERGENCY)
Dept: CT IMAGING | Facility: HOSPITAL | Age: 68
End: 2023-02-26

## 2023-02-26 DIAGNOSIS — R20.0 NUMBNESS: ICD-10-CM

## 2023-02-26 DIAGNOSIS — E11.9 DIABETES MELLITUS TYPE 2 IN NONOBESE (HCC): ICD-10-CM

## 2023-02-26 DIAGNOSIS — R20.0 LEFT SIDED NUMBNESS: ICD-10-CM

## 2023-02-26 DIAGNOSIS — R06.00 ACUTE DYSPNEA: ICD-10-CM

## 2023-02-26 DIAGNOSIS — I10 ESSENTIAL HYPERTENSION: ICD-10-CM

## 2023-02-26 DIAGNOSIS — I25.119 CORONARY ARTERY DISEASE INVOLVING NATIVE CORONARY ARTERY OF NATIVE HEART WITH ANGINA PECTORIS (HCC): ICD-10-CM

## 2023-02-26 DIAGNOSIS — R42 LIGHTHEADEDNESS: ICD-10-CM

## 2023-02-26 LAB
2HR DELTA HS TROPONIN: 9 NG/L
4HR DELTA HS TROPONIN: 15 NG/L
ALBUMIN SERPL BCP-MCNC: 4.2 G/DL (ref 3.5–5)
ALP SERPL-CCNC: 55 U/L (ref 46–116)
ALT SERPL W P-5'-P-CCNC: 29 U/L (ref 12–78)
ANION GAP SERPL CALCULATED.3IONS-SCNC: 13 MMOL/L (ref 4–13)
APTT PPP: 28 SECONDS (ref 23–37)
AST SERPL W P-5'-P-CCNC: 24 U/L (ref 5–45)
BASOPHILS # BLD AUTO: 0.07 THOUSANDS/ÂΜL (ref 0–0.1)
BASOPHILS NFR BLD AUTO: 0 % (ref 0–1)
BILIRUB DIRECT SERPL-MCNC: 0.08 MG/DL (ref 0–0.2)
BILIRUB SERPL-MCNC: 0.28 MG/DL (ref 0.2–1)
BNP SERPL-MCNC: 37 PG/ML (ref 0–100)
BUN SERPL-MCNC: 25 MG/DL (ref 5–25)
CALCIUM SERPL-MCNC: 9.1 MG/DL (ref 8.3–10.1)
CARDIAC TROPONIN I PNL SERPL HS: 15 NG/L
CARDIAC TROPONIN I PNL SERPL HS: 21 NG/L
CARDIAC TROPONIN I PNL SERPL HS: 6 NG/L
CHLORIDE SERPL-SCNC: 103 MMOL/L (ref 96–108)
CO2 SERPL-SCNC: 25 MMOL/L (ref 21–32)
CREAT SERPL-MCNC: 1.37 MG/DL (ref 0.6–1.3)
EOSINOPHIL # BLD AUTO: 0.46 THOUSAND/ÂΜL (ref 0–0.61)
EOSINOPHIL NFR BLD AUTO: 3 % (ref 0–6)
ERYTHROCYTE [DISTWIDTH] IN BLOOD BY AUTOMATED COUNT: 13.3 % (ref 11.6–15.1)
FLUAV RNA RESP QL NAA+PROBE: NEGATIVE
FLUBV RNA RESP QL NAA+PROBE: NEGATIVE
GFR SERPL CREATININE-BSD FRML MDRD: 52 ML/MIN/1.73SQ M
GLUCOSE SERPL-MCNC: 117 MG/DL (ref 65–140)
HCT VFR BLD AUTO: 41.9 % (ref 36.5–49.3)
HGB BLD-MCNC: 13.7 G/DL (ref 12–17)
IMM GRANULOCYTES # BLD AUTO: 0.08 THOUSAND/UL (ref 0–0.2)
IMM GRANULOCYTES NFR BLD AUTO: 1 % (ref 0–2)
INR PPP: 1.01 (ref 0.84–1.19)
LYMPHOCYTES # BLD AUTO: 2.11 THOUSANDS/ÂΜL (ref 0.6–4.47)
LYMPHOCYTES NFR BLD AUTO: 13 % (ref 14–44)
MAGNESIUM SERPL-MCNC: 2 MG/DL (ref 1.6–2.6)
MCH RBC QN AUTO: 31.1 PG (ref 26.8–34.3)
MCHC RBC AUTO-ENTMCNC: 32.7 G/DL (ref 31.4–37.4)
MCV RBC AUTO: 95 FL (ref 82–98)
MONOCYTES # BLD AUTO: 1.41 THOUSAND/ÂΜL (ref 0.17–1.22)
MONOCYTES NFR BLD AUTO: 9 % (ref 4–12)
NEUTROPHILS # BLD AUTO: 11.62 THOUSANDS/ÂΜL (ref 1.85–7.62)
NEUTS SEG NFR BLD AUTO: 74 % (ref 43–75)
NRBC BLD AUTO-RTO: 0 /100 WBCS
PLATELET # BLD AUTO: 198 THOUSANDS/UL (ref 149–390)
PMV BLD AUTO: 11.5 FL (ref 8.9–12.7)
POTASSIUM SERPL-SCNC: 3.9 MMOL/L (ref 3.5–5.3)
PROT SERPL-MCNC: 7.8 G/DL (ref 6.4–8.4)
PROTHROMBIN TIME: 13.1 SECONDS (ref 11.6–14.5)
RBC # BLD AUTO: 4.41 MILLION/UL (ref 3.88–5.62)
RSV RNA RESP QL NAA+PROBE: NEGATIVE
SARS-COV-2 RNA RESP QL NAA+PROBE: NEGATIVE
SODIUM SERPL-SCNC: 141 MMOL/L (ref 135–147)
WBC # BLD AUTO: 15.75 THOUSAND/UL (ref 4.31–10.16)

## 2023-02-26 RX ORDER — AMLODIPINE BESYLATE 10 MG/1
10 TABLET ORAL DAILY
Status: DISCONTINUED | OUTPATIENT
Start: 2023-02-27 | End: 2023-02-27 | Stop reason: HOSPADM

## 2023-02-26 RX ORDER — CARVEDILOL 12.5 MG/1
12.5 TABLET ORAL 2 TIMES DAILY WITH MEALS
Status: DISCONTINUED | OUTPATIENT
Start: 2023-02-26 | End: 2023-02-27 | Stop reason: HOSPADM

## 2023-02-26 RX ORDER — ACETAMINOPHEN 325 MG/1
650 TABLET ORAL EVERY 6 HOURS PRN
Status: DISCONTINUED | OUTPATIENT
Start: 2023-02-26 | End: 2023-02-27 | Stop reason: HOSPADM

## 2023-02-26 RX ORDER — SODIUM CHLORIDE 9 MG/ML
100 INJECTION, SOLUTION INTRAVENOUS CONTINUOUS
Status: DISPENSED | OUTPATIENT
Start: 2023-02-26 | End: 2023-02-27

## 2023-02-26 RX ORDER — LISINOPRIL 20 MG/1
40 TABLET ORAL DAILY
Status: DISCONTINUED | OUTPATIENT
Start: 2023-02-27 | End: 2023-02-27 | Stop reason: HOSPADM

## 2023-02-26 RX ORDER — ATORVASTATIN CALCIUM 40 MG/1
40 TABLET, FILM COATED ORAL DAILY
Status: DISCONTINUED | OUTPATIENT
Start: 2023-02-27 | End: 2023-02-27 | Stop reason: HOSPADM

## 2023-02-26 RX ORDER — NICOTINE 21 MG/24HR
1 PATCH, TRANSDERMAL 24 HOURS TRANSDERMAL DAILY
Status: DISCONTINUED | OUTPATIENT
Start: 2023-02-26 | End: 2023-02-27 | Stop reason: HOSPADM

## 2023-02-26 RX ORDER — ASPIRIN 81 MG/1
81 TABLET, CHEWABLE ORAL DAILY
Status: DISCONTINUED | OUTPATIENT
Start: 2023-02-27 | End: 2023-02-27 | Stop reason: HOSPADM

## 2023-02-26 RX ORDER — CHLORTHALIDONE 25 MG/1
25 TABLET ORAL DAILY
Status: DISCONTINUED | OUTPATIENT
Start: 2023-02-27 | End: 2023-02-27 | Stop reason: HOSPADM

## 2023-02-26 RX ORDER — BUSPIRONE HYDROCHLORIDE 5 MG/1
10 TABLET ORAL 2 TIMES DAILY
Status: DISCONTINUED | OUTPATIENT
Start: 2023-02-26 | End: 2023-02-27 | Stop reason: HOSPADM

## 2023-02-26 RX ADMIN — SODIUM CHLORIDE 100 ML/HR: 0.9 INJECTION, SOLUTION INTRAVENOUS at 17:03

## 2023-02-26 RX ADMIN — BUSPIRONE HYDROCHLORIDE 10 MG: 5 TABLET ORAL at 18:22

## 2023-02-26 RX ADMIN — CARVEDILOL 12.5 MG: 12.5 TABLET, FILM COATED ORAL at 18:22

## 2023-02-26 RX ADMIN — IOHEXOL 120 ML: 350 INJECTION, SOLUTION INTRAVENOUS at 14:06

## 2023-02-26 RX ADMIN — SODIUM CHLORIDE 1000 ML: 0.9 INJECTION, SOLUTION INTRAVENOUS at 12:22

## 2023-02-26 NOTE — ED NOTES
Patient transported to [facility] [place], [address] [place] [address]  02/26/23 [id number]

## 2023-02-26 NOTE — PLAN OF CARE
Problem: PAIN - ADULT  Goal: Verbalizes/displays adequate comfort level or baseline comfort level  Description: Interventions:  - Encourage patient to monitor pain and request assistance  - Assess pain using appropriate pain scale  - Administer analgesics based on type and severity of pain and evaluate response  - Implement non-pharmacological measures as appropriate and evaluate response  - Consider cultural and social influences on pain and pain management  - Notify physician/advanced practitioner if interventions unsuccessful or patient reports new pain  Outcome: Progressing     Problem: INFECTION - ADULT  Goal: Absence or prevention of progression during hospitalization  Description: INTERVENTIONS:  - Assess and monitor for signs and symptoms of infection  - Monitor lab/diagnostic results  - Monitor all insertion sites, i e  indwelling lines, tubes, and drains  - Monitor endotracheal if appropriate and nasal secretions for changes in amount and color  - [place] appropriate cooling/warming therapies per order  - Administer medications as ordered  - Instruct and encourage patient and family to use good hand hygiene technique  - Identify and instruct in appropriate isolation precautions for identified infection/condition  Outcome: Progressing  Goal: Absence of fever/infection during neutropenic period  Description: INTERVENTIONS:  - Monitor WBC    Outcome: Progressing     Problem: SAFETY ADULT  Goal: Patient will remain free of falls  Description: INTERVENTIONS:  - Educate patient/family on patient safety including physical limitations  - Instruct patient to call for assistance with activity   - Consult OT/PT to assist with strengthening/mobility   - Keep Call bell within reach  - Keep bed low and locked with side rails adjusted as appropriate  - Keep care items and personal belongings within reach  - Initiate and maintain comfort rounds  - Make Fall Risk Sign visible to staff  - Offer Toileting every *** Hours, in advance of need  - Initiate/Maintain ***alarm  - Obtain necessary fall risk management equipment: ***  - Apply yellow socks and bracelet for high fall risk patients  - Consider moving patient to room near nurses station  Outcome: Progressing  Goal: Maintain or return to baseline ADL function  Description: INTERVENTIONS:  -  Assess patient's ability to carry out ADLs; assess patient's baseline for ADL function and identify physical deficits which impact ability to perform ADLs (bathing, care of mouth/teeth, toileting, grooming, dressing, etc )  - Assess/evaluate cause of self-care deficits   - Assess range of motion  - Assess patient's mobility; develop plan if impaired  - Assess patient's need for assistive devices and provide as appropriate  - Encourage maximum independence but intervene and supervise when necessary  - Involve family in performance of ADLs  - Assess for home care needs following discharge   - Consider OT consult to assist with ADL evaluation and planning for discharge  - Provide patient education as appropriate  Outcome: Progressing  Goal: Maintains/Returns to pre admission functional level  Description: INTERVENTIONS:  - Perform BMAT or MOVE assessment daily    - Set and communicate daily mobility goal to care team and patient/family/caregiver  - Collaborate with rehabilitation services on mobility goals if consulted  - Perform Range of Motion *** times a day  - Reposition patient every *** hours    - Dangle patient *** times a day  - Stand patient *** times a day  - Ambulate patient *** times a day  - Out of bed to chair *** times a day   - Out of bed for meals *** times a day  - Out of bed for toileting  - Record patient progress and toleration of activity level   Outcome: Progressing     Problem: DISCHARGE PLANNING  Goal: Discharge to home or other facility with appropriate resources  Description: INTERVENTIONS:  - Identify barriers to discharge w/patient and caregiver  - Arrange for needed discharge resources and transportation as appropriate  - Identify discharge learning needs (meds, wound care, etc )  - Arrange for interpretive services to assist at discharge as needed  - Refer to Case Management Department for coordinating discharge planning if the patient needs post-hospital services based on physician/advanced practitioner order or complex needs related to functional status, cognitive ability, or social support system  Outcome: Progressing     Problem: Knowledge Deficit  Goal: Patient/family/caregiver demonstrates understanding of disease process, treatment plan, medications, and discharge instructions  Description: Complete learning assessment and assess knowledge base    Interventions:  - Provide teaching at level of understanding  - Provide teaching via preferred learning methods  Outcome: Progressing     Problem: CARDIOVASCULAR - ADULT  Goal: Maintains optimal cardiac output and hemodynamic stability  Description: INTERVENTIONS:  - Monitor I/O, vital signs and rhythm  - Monitor for S/S and trends of decreased cardiac output  - Administer and titrate ordered vasoactive medications to optimize hemodynamic stability  - Assess quality of pulses, skin color and temperature  - Assess for signs of decreased coronary artery perfusion  - Instruct patient to report change in severity of symptoms  Outcome: Progressing  Goal: Absence of cardiac dysrhythmias or at baseline rhythm  Description: INTERVENTIONS:  - Continuous cardiac monitoring, vital signs, obtain 12 lead EKG if ordered  - Administer antiarrhythmic and heart rate control medications as ordered  - Monitor electrolytes and administer replacement therapy as ordered  Outcome: Progressing     Problem: RESPIRATORY - ADULT  Goal: Achieves optimal ventilation and oxygenation  Description: INTERVENTIONS:  - Assess for changes in respiratory status  - Assess for changes in mentation and behavior  - Position to facilitate oxygenation and minimize respiratory effort  - Oxygen administered by appropriate delivery if ordered  - Initiate smoking cessation education as indicated  - Encourage broncho-pulmonary hygiene including cough, deep breathe, Incentive Spirometry  - Assess the need for suctioning and aspirate as needed  - Assess and instruct to report SOB or any respiratory difficulty  - Respiratory Therapy support as indicated  Outcome: Progressing     Problem: Nutrition/Hydration-ADULT  Goal: Nutrient/Hydration intake appropriate for improving, restoring or maintaining nutritional needs  Description: Monitor and assess patient's nutrition/hydration status for malnutrition  Collaborate with interdisciplinary team and initiate plan and interventions as ordered  Monitor patient's weight and dietary intake as ordered or per policy  Utilize nutrition screening tool and intervene as necessary  Determine patient's food preferences and provide high-protein, high-caloric foods as appropriate       INTERVENTIONS:  - Monitor oral intake, urinary output, labs, and treatment plans  - Assess nutrition and hydration status and recommend course of action  - Evaluate amount of meals eaten  - Assist patient with eating if necessary   - Allow adequate time for meals  - Recommend/ encourage appropriate diets, oral nutritional supplements, and vitamin/mineral supplements  - Order, calculate, and assess calorie counts as needed  - Recommend, monitor, and adjust tube feedings and TPN/PPN based on assessed needs  - Assess need for intravenous fluids  - Provide specific nutrition/hydration education as appropriate  - Include patient/family/caregiver in decisions related to nutrition  Outcome: Progressing

## 2023-02-26 NOTE — ASSESSMENT & PLAN NOTE
Presented with lightheadnedness  Does have h/o of coronary artery disease status post stent  EKG showed no signs of acute ischemia  Initial troponin negative  Trend troponins  C/w tele

## 2023-02-26 NOTE — ASSESSMENT & PLAN NOTE
Presented with left sided numbness that resolved shortly after arrival  CTA without an acute abnormalities  Pt declined MRI  Consider neuro consult

## 2023-02-26 NOTE — ASSESSMENT & PLAN NOTE
Noted to have a white count 15 7  No obvious overt infection   remains afebrile   White count was noted to be 11 K few months ago  We will monitor for now off antibiotics closely

## 2023-02-26 NOTE — ASSESSMENT & PLAN NOTE
Lab Results   Component Value Date    EGFR 52 02/26/2023    EGFR 79 11/05/2021    EGFR 87 04/30/2021    CREATININE 1 37 (H) 02/26/2023    CREATININE 1 00 11/05/2021    CREATININE 0 92 04/30/2021   cr baseline  9-1 2  Mildly elevated from baseline  Will provide iv fluids

## 2023-02-26 NOTE — H&P
[address] 1955, 79 y o  male MRN: [id number]  Unit/Bed#: ED 07 Encounter: [id number]  Primary Care Provider: June Chan PA-C   Date and time admitted to hospital: 2/26/2023 11:34 AM    * Lightheadedness  Assessment & Plan  Presented with lightheadnedness  Does have h/o of coronary artery disease status post stent  EKG showed no signs of acute ischemia  Initial troponin negative  Trend troponins  C/w tele    Numbness  Assessment & Plan  Presented with left sided numbness that resolved shortly after arrival  CTA without an acute abnormalities  Pt declined MRI  Consider neuro consult     Stage 3 chronic kidney disease, unspecified whether stage 3a or 3b CKD ([facility] )  Assessment & Plan  Lab Results   Component Value Date    EGFR 52 02/26/2023    EGFR 79 11/05/2021    EGFR 87 04/30/2021    CREATININE 1 37 (H) 02/26/2023    CREATININE 1 00 11/05/2021    CREATININE 0 92 04/30/2021   cr baseline  9-1 2  Mildly elevated from baseline  Will provide iv fluids     Generalized anxiety disorder  Assessment & Plan  C/w buspar    Coronary artery disease involving native coronary artery of native heart with angina pectoris ([facility] )  Assessment & Plan  continue aspirin, statin, beta-blocker    Tobacco abuse  Assessment & Plan  nictone patch    Dyslipidemia  Assessment & Plan  C/w statin      VTE Prophylaxis: Heparin  / sequential compression device   Code Status: full code  POLST: There is no POLST form on file for this patient (pre-hospital)  Discussion with family: pt    Anticipated Length of Stay:  Patient will be admitted on an Observation basis with an anticipated length of stay of  < 2 midnights  Justification for Hospital Stay: lighheadedness    Total Time for Visit, including Counseling / Coordination of Care: 60 minutes  Greater than 50% of this total time spent on direct patient counseling and coordination of care      Chief Complaint:   Lightheadedness    History of Present Illness:    Clare Bravo is a 79 y o  male  With PMH CAD, HTN, nicotine dependence who presented with lightheadedness  Started at approximately 10 this morning reports began after picking up after her dog  Reports some numbness that began earlier today as well otherwise denies any acute complaints  Denies chest pain, palpitations, fevers, chills, Alex pain, nausea, vomiting, dysuria, headaches or any other complaints  Review of Systems:    Review of Systems   Constitutional: Negative for appetite change, chills, diaphoresis, fatigue, fever and unexpected weight change  HENT: Negative for congestion, rhinorrhea and sore throat  Eyes: Negative for photophobia and visual disturbance  Respiratory: Negative for cough, shortness of breath and wheezing  Cardiovascular: Negative for chest pain, palpitations and leg swelling  Gastrointestinal: Negative for abdominal pain, anal bleeding, blood in stool, constipation, diarrhea, nausea and vomiting  Genitourinary: Negative for decreased urine volume, difficulty urinating, dysuria, flank pain, frequency, hematuria and urgency  Musculoskeletal: Negative for arthralgias, back pain, joint swelling and myalgias  Skin: Negative for color change and rash  Neurological: Positive for dizziness and light-headedness  Negative for seizures, facial asymmetry, speech difficulty, numbness and headaches  Psychiatric/Behavioral: Negative for agitation, confusion and decreased concentration  The patient is not nervous/anxious          Past Medical and Surgical History:     Past Medical History:   Diagnosis Date   • Anxiety    • Arthritis    • Diabetes mellitus ([facility] )    • Hyperlipidemia    • Hyperosmolar non-ketotic state in patient with type 2 diabetes mellitus ([facility] ) 12/05/2019   • Hypertension    • Type 2 diabetes mellitus with hyperglycemia, with long-term current use of insulin ([facility] ) 05/08/2020       Past Surgical History:   Procedure Laterality Date   • CARDIAC CATHETERIZATION N/A 11/11/2021    Procedure: Cardiac catheterization;  Surgeon: Soumya Patel MD;  Location: [address] [place] [address] CATH LAB; Service: Cardiology   • CARDIAC CATHETERIZATION Left 11/11/2021    Procedure: Cardiac Coronary Angiogram;  Surgeon: Soumya Patel MD;  Location: [address] [place] [address] CATH LAB; Service: Cardiology   • COLONOSCOPY  2010   • KNEE SURGERY Right        Meds/Allergies:    Prior to Admission medications    Medication Sig Start Date End Date Taking? Authorizing Provider   amLODIPine (NORVASC) 10 mg tablet Take 1 tablet (10 mg total) by mouth daily 2/3/23   Adriane Rubinstein, PA-C   aspirin 81 mg chewable tablet Chew 1 tablet (81 mg total) daily 2/3/23 8/2/23  Adriane Rubinstein, PA-C   atorvastatin (LIPITOR) 40 mg tablet Take 1 tablet (40 mg total) by mouth daily 2/3/23 8/2/23  Adriane Rubinstein, PA-C   busPIRone (BUSPAR) 10 mg tablet Take 1 tablet (10 mg total) by mouth 2 (two) times a day 2/3/23   Adriane Rubinstein, PA-C   carvedilol (COREG) 12 5 mg tablet Take 1 tablet (12 5 mg total) by mouth 2 (two) times a day with meals 2/3/23   Adriane Rubinstein, PA-C   chlorthalidone 25 mg tablet Take 1 tablet (25 mg total) by mouth daily 2/3/23   Adriane Rubinstein, PA-C   glimepiride (AMARYL) 2 mg tablet Take 1 tablet (2 mg total) by mouth daily with breakfast 2/3/23   Adriane Rubinstein, PA-C   lisinopril (ZESTRIL) 40 mg tablet Take 1 tablet (40 mg total) by mouth daily 2/3/23   Adriane Rubinstein, PA-C   metFORMIN (GLUCOPHAGE) 500 mg tablet Take 1 tablet (500 mg total) by mouth 2 (two) times a day with meals 2/3/23   Adriane Rubinstein, PA-C   Multiple Vitamins-Minerals (MULTIVITAMIN WITH MINERALS) tablet Take 1 tablet by mouth daily    Historical Provider, MD   terbinafine (LamISIL) 250 mg tablet Take 1 tablet (250 mg total) by mouth daily 2/3/23 4/28/23  Adriane Rubinstein, PA-C     I have reviewed home medications with patient personally      Allergies: No Known Allergies    Social History:     Marital Status: /Civil Union     Patient Pre-hospital Living Situation:  home  Patient Pre-hospital Level of Mobility: independent  Patient Pre-hospital Diet Restrictions: i  Substance Use History:   Social History     Substance and Sexual Activity   Alcohol Use Yes    Comment: occasionally     Social History     Tobacco Use   Smoking Status Light Smoker   • Packs/day: 0 50   • Years: 40 00   • Pack years: 20 00   • Types: Cigarettes   Smokeless Tobacco Never     Social History     Substance and Sexual Activity   Drug Use Never       Family History:    Family History   Problem Relation Age of Onset   • Diabetes Mother    • Hyperlipidemia Mother    • Dementia Mother    • Hyperlipidemia Father        Physical Exam:     Vitals:   Blood Pressure: 154/67 (02/26/23 1345)  Pulse: 64 (02/26/23 1345)  Temperature: 98 6 °F (37 °C) (02/26/23 1345)  Temp Source: Oral (02/26/23 1345)  Respirations: 12 (02/26/23 1345)  Height: 5' 7" (170 2 cm) (02/26/23 1137)  Weight - Scale: 78 8 kg (173 lb 11 6 oz) (02/26/23 1137)  SpO2: 96 % (02/26/23 1345)    Physical Exam  Constitutional:       General: He is not in acute distress  Appearance: He is well-developed  He is not diaphoretic  HENT:      Head: Normocephalic and atraumatic  Nose: Nose normal       Mouth/Throat:      Pharynx: No oropharyngeal exudate  Eyes:      General: No scleral icterus  Conjunctiva/sclera: Conjunctivae normal    Cardiovascular:      Rate and Rhythm: Normal rate and regular rhythm  Heart sounds: Normal heart sounds  No murmur heard  No friction rub  No gallop  Pulmonary:      Effort: Pulmonary effort is normal  No respiratory distress  Breath sounds: Normal breath sounds  No wheezing or rales  Chest:      Chest wall: No tenderness  Abdominal:      General: Bowel sounds are normal  There is no distension  Palpations: Abdomen is soft  Tenderness: There is no abdominal tenderness  There is no guarding     Musculoskeletal:         General: No tenderness or deformity  Normal range of motion  Cervical back: Normal range of motion and neck supple  Skin:     General: Skin is warm and dry  Findings: No erythema  Neurological:      Mental Status: He is alert  Mental status is at baseline  Additional Data:     Lab Results: I have personally reviewed pertinent reports  Results from last 7 days   Lab Units 02/26/23  1221   WBC Thousand/uL 15 75*   HEMOGLOBIN g/dL 13 7   HEMATOCRIT % 41 9   PLATELETS Thousands/uL 198   NEUTROS PCT % 74   LYMPHS PCT % 13*   MONOS PCT % 9   EOS PCT % 3     Results from last 7 days   Lab Units 02/26/23  1221   SODIUM mmol/L 141   POTASSIUM mmol/L 3 9   CHLORIDE mmol/L 103   CO2 mmol/L 25   BUN mg/dL 25   CREATININE mg/dL 1 37*   ANION GAP mmol/L 13   CALCIUM mg/dL 9 1   ALBUMIN g/dL 4 2   TOTAL BILIRUBIN mg/dL 0 28   ALK PHOS U/L 55   ALT U/L 29   AST U/L 24   GLUCOSE RANDOM mg/dL 117     Results from last 7 days   Lab Units 02/26/23  1221   INR  1 01                   Imaging: I have personally reviewed pertinent reports  CTA head and neck with and without contrast   Final Result by lElie Carrillo MD (02/26 4965)      No acute intracranial abnormality  Incidentally noted ulceration in the anterior cartilaginous nasal septum, recommend direct visualization  No cervical or intracranial large vessel occlusion  Stable severe stenosis at the origin of the right ICA secondary to calcified and noncalcified plaque  No significant downstream tandem right ICA stenosis  No hemodynamically significant stenosis at the left cervical carotid bifurcation  Moderate stenosis at the left ECA origin  Findings appear less pronounced when compared to the prior CTA study from December 2019    Consider follow-up MRA imaging with    and without contrast       2 mm inferiorly directed outpouching arising from the left supraclinoid ICA may represent an infundibulum 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  This is stable when comparing to the prior CTA study of September 24, 2019  Workstation performed: [id number]         CTA dissection protocol chest/abdomen/pelvis   Final Result by Rolando Ambrose MD (02/26 7606)      No evidence of aortic aneurysm or aortic dissection  Workstation performed: [id number]         XR chest 1 view portable   ED Interpretation by Lisandra Meyer DO (02/26 1241)   No acute abnormality in the chest           EKG, Pathology, and Other Studies Reviewed on Admission:   · EKG: reviewed    Allscripts / Epic Records Reviewed: Yes     ** Please Note: This note has been constructed using a voice recognition system   **

## 2023-02-26 NOTE — ED PROVIDER NOTES
History  Chief Complaint   Patient presents with   • Chest Pain     Patient c/o chest pressure and left sided facial tingling, bilateral leg weakness x 2 hours  Patient is a 60-year-old male with past medical history of coronary artery disease status post stent, hypertension, hyperlipidemia, noninsulin-dependent diabetes, anxiety, presents to the emergency department for acute lightheadedness, heavy breathing and bilateral leg weakness  Patient states that he was picking up dog poop in his yard when he started to feel lightheaded and was having a difficult time breathing  He reports that he felt his breathing was heavy and he also felt very weak in both legs  He states he feels somewhat weaker on the left side and also had left-sided arm and leg numbness feeling however that has improved  He denies any chest pain ever admits to intermittent palpitations but states he does get palpitations when he is having anxiety  He denies any diaphoresis but states he felt clammy when it first started  He denies any recent fevers or chills, headache, vertigo, change in vision or hearing, cough, URI symptoms, hemoptysis, abdominal pain, back pain, nausea, vomiting, change in bowel habits, blood per rectum or melena, urinary symptoms, skin rash or color change, leg swelling, facial drooping, slurring of speech or difficulty getting words out, ataxia or other focal neurologic deficits  History provided by:  Patient   used: No    Chest Pain  Associated symptoms: numbness, palpitations, shortness of breath and weakness    Associated symptoms: no abdominal pain, no back pain, no cough, no diaphoresis, no dizziness, no fever, no headache, no nausea and not vomiting        Prior to Admission Medications   Prescriptions Last Dose Informant Patient Reported? Taking?    Multiple Vitamins-Minerals (MULTIVITAMIN WITH MINERALS) tablet   Yes No   Sig: Take 1 tablet by mouth daily   amLODIPine (NORVASC) 10 mg tablet   No No   Sig: Take 1 tablet (10 mg total) by mouth daily   aspirin 81 mg chewable tablet   No No   Sig: Chew 1 tablet (81 mg total) daily   atorvastatin (LIPITOR) 40 mg tablet   No No   Sig: Take 1 tablet (40 mg total) by mouth daily   busPIRone (BUSPAR) 10 mg tablet   No No   Sig: Take 1 tablet (10 mg total) by mouth 2 (two) times a day   carvedilol (COREG) 12 5 mg tablet   No No   Sig: Take 1 tablet (12 5 mg total) by mouth 2 (two) times a day with meals   chlorthalidone 25 mg tablet   No No   Sig: Take 1 tablet (25 mg total) by mouth daily   glimepiride (AMARYL) 2 mg tablet   No No   Sig: Take 1 tablet (2 mg total) by mouth daily with breakfast   lisinopril (ZESTRIL) 40 mg tablet   No No   Sig: Take 1 tablet (40 mg total) by mouth daily   metFORMIN (GLUCOPHAGE) 500 mg tablet   No No   Sig: Take 1 tablet (500 mg total) by mouth 2 (two) times a day with meals   terbinafine (LamISIL) 250 mg tablet   No No   Sig: Take 1 tablet (250 mg total) by mouth daily      Facility-Administered Medications: None       Past Medical History:   Diagnosis Date   • Anxiety    • Arthritis    • Diabetes mellitus ([facility] )    • Hyperlipidemia    • Hyperosmolar non-ketotic state in patient with type 2 diabetes mellitus ([facility] ) 12/05/2019   • Hypertension    • Type 2 diabetes mellitus with hyperglycemia, with long-term current use of insulin ([facility] ) 05/08/2020       Past Surgical History:   Procedure Laterality Date   • CARDIAC CATHETERIZATION N/A 11/11/2021    Procedure: Cardiac catheterization;  Surgeon: Jasen Ontiveros MD;  Location: [address] [place] [address] CATH LAB; Service: Cardiology   • CARDIAC CATHETERIZATION Left 11/11/2021    Procedure: Cardiac Coronary Angiogram;  Surgeon: Jasen Ontiveros MD;  Location: [address] [place] [address] CATH LAB;   Service: Cardiology   • COLONOSCOPY  2010   • KNEE SURGERY Right        Family History   Problem Relation Age of Onset   • Diabetes Mother    • Hyperlipidemia Mother    • Dementia Mother    • Hyperlipidemia Father I have reviewed and agree with the history as documented  E-Cigarette/Vaping   • E-Cigarette Use Never User      E-Cigarette/Vaping Substances     Social History     Tobacco Use   • Smoking status: Light Smoker     Packs/day: 0 50     Years: 40 00     Pack years: 20 00     Types: Cigarettes   • Smokeless tobacco: Never   Vaping Use   • Vaping Use: Never used   Substance Use Topics   • Alcohol use: Yes     Comment: occasionally   • Drug use: Never       Review of Systems   Constitutional: Negative for chills, diaphoresis and fever  HENT: Negative for congestion, ear pain, hearing loss, rhinorrhea, sore throat and tinnitus  Eyes: Negative for pain and visual disturbance  Respiratory: Positive for shortness of breath  Negative for cough, chest tightness and wheezing  Cardiovascular: Positive for palpitations  Negative for chest pain  Gastrointestinal: Negative for abdominal distention, abdominal pain, blood in stool, constipation, diarrhea, nausea and vomiting  Genitourinary: Negative for dysuria, flank pain, frequency and hematuria  Musculoskeletal: Negative for back pain, neck pain and neck stiffness  Skin: Negative for color change, pallor, rash and wound  Allergic/Immunologic: Negative for immunocompromised state  Neurological: Positive for weakness, light-headedness and numbness  Negative for dizziness, seizures, syncope, facial asymmetry, speech difficulty and headaches  +Bilateral leg weakness  +Left sided numbness/tingling  Hematological: Negative for adenopathy  Psychiatric/Behavioral: Negative for confusion and decreased concentration  The patient is nervous/anxious  All other systems reviewed and are negative  Physical Exam  Physical Exam  Vitals and nursing note reviewed  Constitutional:       General: He is not in acute distress  Appearance: Normal appearance  He is well-developed  He is not ill-appearing, toxic-appearing or diaphoretic     HENT: Head: Normocephalic and atraumatic  Right Ear: External ear normal       Left Ear: External ear normal       Nose: Nose normal       Mouth/Throat:      Mouth: Mucous membranes are moist       Pharynx: Oropharynx is clear  No oropharyngeal exudate  Eyes:      Extraocular Movements: Extraocular movements intact  Conjunctiva/sclera: Conjunctivae normal       Pupils: Pupils are equal, round, and reactive to light  Neck:      Vascular: No JVD  Cardiovascular:      Rate and Rhythm: Normal rate and regular rhythm  Pulses: Normal pulses  Heart sounds: Normal heart sounds  No murmur heard  No friction rub  No gallop  Pulmonary:      Effort: Pulmonary effort is normal  No respiratory distress  Breath sounds: Normal breath sounds  No wheezing, rhonchi or rales  Abdominal:      General: There is no distension  Palpations: Abdomen is soft  Tenderness: There is no abdominal tenderness  There is no guarding or rebound  Musculoskeletal:         General: No swelling or tenderness  Normal range of motion  Cervical back: Normal range of motion and neck supple  No rigidity  Right lower leg: No edema  Left lower leg: No edema  Skin:     General: Skin is warm and dry  Coloration: Skin is not pale  Findings: No erythema or rash  Neurological:      General: No focal deficit present  Mental Status: He is alert and oriented to person, place, and time  Cranial Nerves: No cranial nerve deficit  Sensory: No sensory deficit  Motor: No weakness  Comments: Normal speech  No facial asymmetry  Sensation grossly intact and equal/symmetric in bilateral face, bilateral upper and lower extremities  No subjective or objective sensory loss  5/5 strength in all 4 extremities without extremity drift, both proximal and distal muscle groups    Normal finger-to-nose and heel-to-shin exam    Psychiatric:         Behavior: Behavior normal       Comments: Patient appears mildly anxious           Vital Signs  ED Triage Vitals   Temperature Pulse Respirations Blood Pressure SpO2   02/26/23 1137 02/26/23 1137 02/26/23 1137 02/26/23 1137 02/26/23 1137   98 °F (36 7 °C) 69 16 (!) 196/93 97 %      Temp Source Heart Rate Source Patient Position - Orthostatic VS BP Location FiO2 (%)   02/26/23 1230 02/26/23 1230 02/26/23 1230 02/26/23 1230 --   Oral Monitor Sitting Left arm       Pain Score       --                Vitals:    02/26/23 1137 02/26/23 1230 02/26/23 1330 02/26/23 1345   BP: (!) 196/93 163/72 154/67 154/67   BP Location:  Left arm Left arm Left arm   Pulse: 69 64 61 64   Resp: 16 15 15 12   Temp: 98 °F (36 7 °C) 98 6 °F (37 °C) 98 6 °F (37 °C) 98 6 °F (37 °C)   TempSrc:  Oral Oral Oral   SpO2: 97% 95% 95% 96%   Weight: 78 8 kg (173 lb 11 6 oz)      Height: 5' 7" (1 702 m)          Visual Acuity      ED Medications  Medications   nitroglycerin (NITRO-BID) 2 % TD ointment 1 inch (has no administration in time range)   sodium chloride 0 9 % bolus 1,000 mL (0 mL Intravenous Stopped 2/26/23 1432)   iohexol (OMNIPAQUE) 350 MG/ML injection (MULTI-DOSE) 120 mL (120 mL Intravenous Given 2/26/23 1406)       Diagnostic Studies  Results Reviewed     Procedure Component Value Units Date/Time    FLU/RSV/COVID - if FLU/RSV clinically relevant [[id number]]     Lab Status: No result Specimen: Nares from Nose     HS Troponin I 2hr [[id number]]  (Normal) Collected: 02/26/23 1426    Lab Status: Final result Specimen: Blood from Arm, Left Updated: 02/26/23 1515     hs TnI 2hr 15 ng/L      Delta 2hr hsTnI 9 ng/L     HS Troponin I 4hr [[id number]]     Lab Status: No result Specimen: Blood     Basic metabolic panel [[id number]]  (Abnormal) Collected: 02/26/23 1221    Lab Status: Final result Specimen: Blood from Arm, Left Updated: 02/26/23 1327     Sodium 141 mmol/L      Potassium 3 9 mmol/L      Chloride 103 mmol/L      CO2 25 mmol/L      ANION GAP 13 mmol/L      BUN 25 mg/dL      Creatinine 1 37 mg/dL      Glucose 117 mg/dL      Calcium 9 1 mg/dL      eGFR 52 ml/min/1 73sq m     Narrative:      [place] guidelines for Chronic Kidney Disease (CKD):   •  Stage 1 with normal or high GFR (GFR > 90 mL/min/1 73 square meters)  •  Stage 2 Mild CKD (GFR = 60-89 mL/min/1 73 square meters)  •  Stage 3A Moderate CKD (GFR = 45-59 mL/min/1 73 square meters)  •  Stage 3B Moderate CKD (GFR = 30-44 mL/min/1 73 square meters)  •  Stage 4 Severe CKD (GFR = 15-29 mL/min/1 73 square meters)  •  Stage 5 End Stage CKD (GFR <15 mL/min/1 73 square meters)  Note: GFR calculation is accurate only with a steady state creatinine    Hepatic function panel [[id number]]  (Normal) Collected: 02/26/23 1221    Lab Status: Final result Specimen: Blood from Arm, Left Updated: 02/26/23 1327     Total Bilirubin 0 28 mg/dL      Bilirubin, Direct 0 08 mg/dL      Alkaline Phosphatase 55 U/L      AST 24 U/L      ALT 29 U/L      Total Protein 7 8 g/dL      Albumin 4 2 g/dL     Magnesium [[id number]]  (Normal) Collected: 02/26/23 1221    Lab Status: Final result Specimen: Blood from Arm, Left Updated: 02/26/23 1327     Magnesium 2 0 mg/dL     B-Type Natriuretic Peptide(BNP) [[id number]]  (Normal) Collected: 02/26/23 1221    Lab Status: Final result Specimen: Blood from Arm, Left Updated: 02/26/23 1327     BNP 37 pg/mL     HS Troponin 0hr (reflex protocol) [[id number]]  (Normal) Collected: 02/26/23 1221    Lab Status: Final result Specimen: Blood from Arm, Left Updated: 02/26/23 1318     hs TnI 0hr 6 ng/L     Protime-INR [[id number]]  (Normal) Collected: 02/26/23 1221    Lab Status: Final result Specimen: Blood from Arm, Left Updated: 02/26/23 1306     Protime 13 1 seconds      INR 1 01    APTT [[id number]]  (Normal) Collected: 02/26/23 1221    Lab Status: Final result Specimen: Blood from Arm, Left Updated: 02/26/23 1306     PTT 28 seconds     CBC and differential [[id number]]  (Abnormal) Collected: 02/26/23 1221    Lab Status: Final result Specimen: Blood from Arm, Left Updated: 02/26/23 1252     WBC 15 75 Thousand/uL      RBC 4 41 Million/uL      Hemoglobin 13 7 g/dL      Hematocrit 41 9 %      MCV 95 fL      MCH 31 1 pg      MCHC 32 7 g/dL      RDW 13 3 %      MPV 11 5 fL      Platelets 539 Thousands/uL      nRBC 0 /100 WBCs      Neutrophils Relative 74 %      Immat GRANS % 1 %      Lymphocytes Relative 13 %      Monocytes Relative 9 %      Eosinophils Relative 3 %      Basophils Relative 0 %      Neutrophils Absolute 11 62 Thousands/µL      Immature Grans Absolute 0 08 Thousand/uL      Lymphocytes Absolute 2 11 Thousands/µL      Monocytes Absolute 1 41 Thousand/µL      Eosinophils Absolute 0 46 Thousand/µL      Basophils Absolute 0 07 Thousands/µL                  CTA head and neck with and without contrast   Final Result by Елена Luciano MD (02/26 5977)      No acute intracranial abnormality  Incidentally noted ulceration in the anterior cartilaginous nasal septum, recommend direct visualization  No cervical or intracranial large vessel occlusion  Stable severe stenosis at the origin of the right ICA secondary to calcified and noncalcified plaque  No significant downstream tandem right ICA stenosis  No hemodynamically significant stenosis at the left cervical carotid bifurcation  Moderate stenosis at the left ECA origin  Findings appear less pronounced when compared to the prior CTA study from December 2019  Consider follow-up MRA imaging with    and without contrast       2 mm inferiorly directed outpouching arising from the left supraclinoid ICA may represent an infundibulum 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  This is stable when comparing to the prior CTA study of September 24, 2019                    Workstation performed: [id number]         CTA dissection protocol chest/abdomen/pelvis   Final Result by Zuleyma Maradiaga MD (02/26 6427)      No evidence of aortic aneurysm or aortic dissection  Workstation performed: [id number]         XR chest 1 view portable   ED Interpretation by Romie Cueva DO (02/26 1241)   No acute abnormality in the chest                  Procedures  ECG 12 Lead Documentation Only    Date/Time: 2/26/2023 11:38 AM  Performed by: Romie Cueva DO  Authorized by: Romie Cueva DO     ECG reviewed by me, the ED Provider: yes    Patient location:  ED  Previous ECG:     Previous ECG:  Compared to current    Comparison ECG info:  11-    Similarity:  No change  Rate:     ECG rate:  72    ECG rate assessment: normal    Rhythm:     Rhythm: sinus rhythm    Ectopy:     Ectopy: none    QRS:     QRS axis:  Normal    QRS intervals: Wide  Conduction:     Conduction: abnormal      Abnormal conduction: complete LBBB    ST segments:     ST segments:  Normal  T waves:     T waves: normal      ECG 12 Lead Documentation Only    Date/Time: 2/26/2023 2:24 PM  Performed by: Romie Cueva DO  Authorized by: Romie Cueva DO     ECG reviewed by me, the ED Provider: yes    Patient location:  ED  Previous ECG:     Previous ECG:  Compared to current    Similarity:  No change  Rate:     ECG rate:  67    ECG rate assessment: normal    Rhythm:     Rhythm: sinus rhythm    Ectopy:     Ectopy: none    QRS:     QRS axis:  Normal    QRS intervals: Wide  Conduction:     Conduction: abnormal      Abnormal conduction: complete LBBB    ST segments:     ST segments:  Normal  T waves:     T waves: normal               ED Course  ED Course as of 02/26/23 1539   Sun Feb 26, 2023   1214 Spoke with stroke neurologist, Dr Tavon Batista, who did not recommend calling stroke alert if patient's symptoms are resolved/improved and he has normal neurologic exam     1329 BNP: 37   1329 hs TnI 0hr: 6   1421 Patient reassessed and updated about normal work up thus far  He reports he is feeling much better and is currently asymptomatic   Suspect anxiety attack  1512 Updated patient about CTA findings including the incidental nasal septal ulceration  I directly visualized bilateral nares and do not see any obvious ulceration or abnormality in the nose  He denies any nasal symptoms such as congestion, nosebleeds or pain  I also discussed the incidental CTA findings in regards to the stenosis of the carotid arteries and 2 mm outpouching arising from the left supraclinoid ICA which could represent small infundibulum  Discussed that MRI/MRA is recommended and I did offer and recommend observation admission in the hospital however patient does not want to be admitted at this time and would prefer to get the MRI as outpatient  He has no current symptoms at present  Discussed possibility of TIA as well as the possibility of missed acute stroke which could lead to worsening symptoms, neurologic disability and death  He understands these risks and accepts these risks and would still like to be discharged  1532 hs TnI 2hr: 15  Updated patient about +delta troponin of +9 and recommended observation in the hospital now and he is agreeable                HEART Risk Score    Flowsheet Row Most Recent Value   Heart Score Risk Calculator    History 0 Filed at: 02/26/2023 1500   ECG 0 Filed at: 02/26/2023 1500   Age 2 Filed at: 02/26/2023 1500   Risk Factors 2 Filed at: 02/26/2023 1500   Troponin 0 Filed at: 02/26/2023 1500   HEART Score 4 Filed at: 02/26/2023 1500           Stroke Assessment     Row Name 02/26/23 1200             NIH Stroke Scale    Interval Baseline      Level of Consciousness (1a ) 0      LOC Questions (1b ) 0      LOC Commands (1c ) 0      Best Gaze (2 ) 0      Visual (3 ) 0      Facial Palsy (4 ) 0      Motor Arm, Left (5a ) 0      Motor Arm, Right (5b ) 0      Motor Leg, Left (6a ) 0      Motor Leg, Right (6b ) 0      Limb Ataxia (7 ) 0      Sensory (8 ) 0      Best Language (9 ) 0      Dysarthria (10 ) 0      Extinction and Inattention (11 ) (Formerly Neglect) 0      Total 0              Flowsheet Row Most Recent Value   Thrombolytic Decision Options    Thrombolytic Decision Patient not a candidate  Patient is not a candidate options Symptoms resolved/clearly non disabling  SBIRT 20yo+    Flowsheet Row Most Recent Value   SBIRT (23 yo +)    In order to provide better care to our patients, we are screening all of our patients for alcohol and drug use  Would it be okay to ask you these screening questions? Unable to answer at this time Filed at: 02/26/2023 1138                    Medical Decision Making  80-year-old male with history of CAD, diabetes, hypertension, hyperlipidemia, anxiety, presents to the ED for multiple symptoms that started approximately 10 AM today  He reports lightheadedness, heavy breathing, bilateral leg weakness as well as subjective feeling of numbness on the left side  Patient has completely normal neurologic exam without any extremity weakness, subjective or objective sensory loss  Symptoms are likely from anxiety however given his significant history will work-up with cardiac labs, CTA head and neck and CTA chest, abdomen to rule out aortic dissection given his significant hypertension  I discussed case with on-call stroke neurologist and he agrees that stroke alert should not be called at this time given his normal exam and that his symptoms are improved  Amount and/or Complexity of Data Reviewed  Labs: ordered  Decision-making details documented in ED Course  Radiology: ordered and independent interpretation performed  Decision-making details documented in ED Course  ECG/medicine tests: ordered and independent interpretation performed  Decision-making details documented in ED Course  Risk  Prescription drug management  Disposition  Final diagnoses:   Acute dyspnea   Lightheadedness   Left sided numbness - Subjective, resolved   No objective sensory loss     Time reflects when diagnosis was documented in both MDM as applicable and the Disposition within this note     Time User Action Codes Description Comment    2/26/2023  3:35 PM Destiny Gonzalez E Add [R06 00] Acute dyspnea     2/26/2023  3:36 PM Destiny [facility] E Add [R42] Lightheadedness     2/26/2023  3:36 PM Destiny Carlos E Add [R20 0] Left sided numbness     2/26/2023  3:36 PM Destiny Gonzalez E Modify [R20 0] Left sided numbness Subjective, resolved  No objective sensory loss      ED Disposition     ED Disposition   Admit    Condition   Stable    Date/Time   Sun Feb 26, 2023  3:36 PM    Comment   Case was discussed with VERÓNICA and the patient's admission status was agreed to be Admission Status: observation status to the service of [facility]   Follow-up Information    None         Patient's Medications   Discharge Prescriptions    No medications on file       No discharge procedures on file      PDMP Review       Value Time User    PDMP Reviewed  Yes 8/27/2021  7:55 AM Aliza Lares MD          ED Provider  Electronically Signed by           Ford Ayala DO  02/26/23 3769

## 2023-02-27 ENCOUNTER — TRANSITIONAL CARE MANAGEMENT (OUTPATIENT)
Dept: FAMILY MEDICINE CLINIC | Facility: CLINIC | Age: 68
End: 2023-02-27

## 2023-02-27 VITALS
OXYGEN SATURATION: 94 % | RESPIRATION RATE: 20 BRPM | HEART RATE: 63 BPM | DIASTOLIC BLOOD PRESSURE: 78 MMHG | SYSTOLIC BLOOD PRESSURE: 146 MMHG | WEIGHT: 171.74 LBS | BODY MASS INDEX: 26.96 KG/M2 | TEMPERATURE: 98.1 F | HEIGHT: 67 IN

## 2023-02-27 LAB
ANION GAP SERPL CALCULATED.3IONS-SCNC: 9 MMOL/L (ref 4–13)
ATRIAL RATE: 67 BPM
ATRIAL RATE: 72 BPM
BASOPHILS # BLD AUTO: 0.06 THOUSANDS/ÂΜL (ref 0–0.1)
BASOPHILS NFR BLD AUTO: 1 % (ref 0–1)
BUN SERPL-MCNC: 19 MG/DL (ref 5–25)
CALCIUM SERPL-MCNC: 8.5 MG/DL (ref 8.3–10.1)
CHLORIDE SERPL-SCNC: 105 MMOL/L (ref 96–108)
CO2 SERPL-SCNC: 25 MMOL/L (ref 21–32)
CREAT SERPL-MCNC: 0.97 MG/DL (ref 0.6–1.3)
EOSINOPHIL # BLD AUTO: 0.5 THOUSAND/ÂΜL (ref 0–0.61)
EOSINOPHIL NFR BLD AUTO: 5 % (ref 0–6)
ERYTHROCYTE [DISTWIDTH] IN BLOOD BY AUTOMATED COUNT: 13.3 % (ref 11.6–15.1)
GFR SERPL CREATININE-BSD FRML MDRD: 80 ML/MIN/1.73SQ M
GLUCOSE SERPL-MCNC: 100 MG/DL (ref 65–140)
GLUCOSE SERPL-MCNC: 102 MG/DL (ref 65–140)
GLUCOSE SERPL-MCNC: 146 MG/DL (ref 65–140)
HCT VFR BLD AUTO: 38 % (ref 36.5–49.3)
HGB BLD-MCNC: 12.4 G/DL (ref 12–17)
IMM GRANULOCYTES # BLD AUTO: 0.03 THOUSAND/UL (ref 0–0.2)
IMM GRANULOCYTES NFR BLD AUTO: 0 % (ref 0–2)
LYMPHOCYTES # BLD AUTO: 2.49 THOUSANDS/ÂΜL (ref 0.6–4.47)
LYMPHOCYTES NFR BLD AUTO: 23 % (ref 14–44)
MCH RBC QN AUTO: 31 PG (ref 26.8–34.3)
MCHC RBC AUTO-ENTMCNC: 32.6 G/DL (ref 31.4–37.4)
MCV RBC AUTO: 95 FL (ref 82–98)
MONOCYTES # BLD AUTO: 1.31 THOUSAND/ÂΜL (ref 0.17–1.22)
MONOCYTES NFR BLD AUTO: 12 % (ref 4–12)
NEUTROPHILS # BLD AUTO: 6.46 THOUSANDS/ÂΜL (ref 1.85–7.62)
NEUTS SEG NFR BLD AUTO: 59 % (ref 43–75)
NRBC BLD AUTO-RTO: 0 /100 WBCS
P AXIS: 67 DEGREES
P AXIS: 70 DEGREES
PLATELET # BLD AUTO: 183 THOUSANDS/UL (ref 149–390)
PMV BLD AUTO: 11.6 FL (ref 8.9–12.7)
POTASSIUM SERPL-SCNC: 3.8 MMOL/L (ref 3.5–5.3)
PR INTERVAL: 172 MS
PR INTERVAL: 180 MS
QRS AXIS: 18 DEGREES
QRS AXIS: 6 DEGREES
QRSD INTERVAL: 154 MS
QRSD INTERVAL: 158 MS
QT INTERVAL: 466 MS
QT INTERVAL: 490 MS
QTC INTERVAL: 510 MS
QTC INTERVAL: 517 MS
RBC # BLD AUTO: 4 MILLION/UL (ref 3.88–5.62)
SODIUM SERPL-SCNC: 139 MMOL/L (ref 135–147)
T WAVE AXIS: 90 DEGREES
T WAVE AXIS: 95 DEGREES
VENTRICULAR RATE: 67 BPM
VENTRICULAR RATE: 72 BPM
WBC # BLD AUTO: 10.85 THOUSAND/UL (ref 4.31–10.16)

## 2023-02-27 RX ORDER — INSULIN LISPRO 100 [IU]/ML
1-6 INJECTION, SOLUTION INTRAVENOUS; SUBCUTANEOUS
Status: DISCONTINUED | OUTPATIENT
Start: 2023-02-27 | End: 2023-02-27 | Stop reason: HOSPADM

## 2023-02-27 RX ORDER — INSULIN LISPRO 100 [IU]/ML
1-6 INJECTION, SOLUTION INTRAVENOUS; SUBCUTANEOUS
Status: DISCONTINUED | OUTPATIENT
Start: 2023-02-27 | End: 2023-02-27

## 2023-02-27 RX ORDER — LISINOPRIL 40 MG/1
TABLET ORAL
Qty: 30 TABLET | Refills: 0 | Status: SHIPPED | OUTPATIENT
Start: 2023-02-27

## 2023-02-27 RX ADMIN — CARVEDILOL 12.5 MG: 12.5 TABLET, FILM COATED ORAL at 08:37

## 2023-02-27 RX ADMIN — ASPIRIN 81 MG: 81 TABLET, CHEWABLE ORAL at 08:37

## 2023-02-27 RX ADMIN — CHLORTHALIDONE 25 MG: 25 TABLET ORAL at 08:37

## 2023-02-27 RX ADMIN — SODIUM CHLORIDE 100 ML/HR: 0.9 INJECTION, SOLUTION INTRAVENOUS at 02:29

## 2023-02-27 RX ADMIN — BUSPIRONE HYDROCHLORIDE 10 MG: 5 TABLET ORAL at 08:37

## 2023-02-27 RX ADMIN — AMLODIPINE BESYLATE 10 MG: 10 TABLET ORAL at 08:37

## 2023-02-27 NOTE — NURSING NOTE
D/c instructions reviewed with pt  All questions answered at this time  IV site removed by pt  All belongings with pt and accounted for  Pt's spouse to transport home

## 2023-02-27 NOTE — PLAN OF CARE
Problem: PAIN - ADULT  Goal: Verbalizes/displays adequate comfort level or baseline comfort level  Description: Interventions:  - Encourage patient to monitor pain and request assistance  - Assess pain using appropriate pain scale  - Administer analgesics based on type and severity of pain and evaluate response  - Implement non-pharmacological measures as appropriate and evaluate response  - Consider cultural and social influences on pain and pain management  - Notify physician/advanced practitioner if interventions unsuccessful or patient reports new pain  Outcome: Adequate for Discharge     Problem: INFECTION - ADULT  Goal: Absence or prevention of progression during hospitalization  Description: INTERVENTIONS:  - Assess and monitor for signs and symptoms of infection  - Monitor lab/diagnostic results  - Monitor all insertion sites, i e  indwelling lines, tubes, and drains  - Monitor endotracheal if appropriate and nasal secretions for changes in amount and color  - [place] appropriate cooling/warming therapies per order  - Administer medications as ordered  - Instruct and encourage patient and family to use good hand hygiene technique  - Identify and instruct in appropriate isolation precautions for identified infection/condition  Outcome: Adequate for Discharge  Goal: Absence of fever/infection during neutropenic period  Description: INTERVENTIONS:  - Monitor WBC    Outcome: Adequate for Discharge     Problem: SAFETY ADULT  Goal: Patient will remain free of falls  Description: INTERVENTIONS:  - Educate patient/family on patient safety including physical limitations  - Instruct patient to call for assistance with activity   - Consult OT/PT to assist with strengthening/mobility   - Keep Call bell within reach  - Keep bed low and locked with side rails adjusted as appropriate  - Keep care items and personal belongings within reach  - Initiate and maintain comfort rounds  - Make Fall Risk Sign visible to staff  - Offer Toileting every  Hours, in advance of need  - Initiate/Maintain alarm  - Obtain necessary fall risk management equipment:   - Apply yellow socks and bracelet for high fall risk patients  - Consider moving patient to room near nurses station  Outcome: Adequate for Discharge  Goal: Maintain or return to baseline ADL function  Description: INTERVENTIONS:  -  Assess patient's ability to carry out ADLs; assess patient's baseline for ADL function and identify physical deficits which impact ability to perform ADLs (bathing, care of mouth/teeth, toileting, grooming, dressing, etc )  - Assess/evaluate cause of self-care deficits   - Assess range of motion  - Assess patient's mobility; develop plan if impaired  - Assess patient's need for assistive devices and provide as appropriate  - Encourage maximum independence but intervene and supervise when necessary  - Involve family in performance of ADLs  - Assess for home care needs following discharge   - Consider OT consult to assist with ADL evaluation and planning for discharge  - Provide patient education as appropriate  Outcome: Adequate for Discharge  Goal: Maintains/Returns to pre admission functional level  Description: INTERVENTIONS:  - Perform BMAT or MOVE assessment daily    - Set and communicate daily mobility goal to care team and patient/family/caregiver  - Collaborate with rehabilitation services on mobility goals if consulted  - Perform Range of Motion  times a day  - Reposition patient every  hours    - Dangle patient  times a day  - Stand patient  times a day  - Ambulate patient  times a day  - Out of bed to chair  times a day   - Out of bed for meals times a day  - Out of bed for toileting  - Record patient progress and toleration of activity level   Outcome: Adequate for Discharge     Problem: DISCHARGE PLANNING  Goal: Discharge to home or other facility with appropriate resources  Description: INTERVENTIONS:  - Identify barriers to discharge w/patient and caregiver  - Arrange for needed discharge resources and transportation as appropriate  - Identify discharge learning needs (meds, wound care, etc )  - Arrange for interpretive services to assist at discharge as needed  - Refer to Case Management Department for coordinating discharge planning if the patient needs post-hospital services based on physician/advanced practitioner order or complex needs related to functional status, cognitive ability, or social support system  Outcome: Adequate for Discharge     Problem: Knowledge Deficit  Goal: Patient/family/caregiver demonstrates understanding of disease process, treatment plan, medications, and discharge instructions  Description: Complete learning assessment and assess knowledge base    Interventions:  - Provide teaching at level of understanding  - Provide teaching via preferred learning methods  Outcome: Adequate for Discharge     Problem: CARDIOVASCULAR - ADULT  Goal: Maintains optimal cardiac output and hemodynamic stability  Description: INTERVENTIONS:  - Monitor I/O, vital signs and rhythm  - Monitor for S/S and trends of decreased cardiac output  - Administer and titrate ordered vasoactive medications to optimize hemodynamic stability  - Assess quality of pulses, skin color and temperature  - Assess for signs of decreased coronary artery perfusion  - Instruct patient to report change in severity of symptoms  Outcome: Adequate for Discharge  Goal: Absence of cardiac dysrhythmias or at baseline rhythm  Description: INTERVENTIONS:  - Continuous cardiac monitoring, vital signs, obtain 12 lead EKG if ordered  - Administer antiarrhythmic and heart rate control medications as ordered  - Monitor electrolytes and administer replacement therapy as ordered  Outcome: Adequate for Discharge     Problem: RESPIRATORY - ADULT  Goal: Achieves optimal ventilation and oxygenation  Description: INTERVENTIONS:  - Assess for changes in respiratory status  - Assess for changes in mentation and behavior  - Position to facilitate oxygenation and minimize respiratory effort  - Oxygen administered by appropriate delivery if ordered  - Initiate smoking cessation education as indicated  - Encourage broncho-pulmonary hygiene including cough, deep breathe, Incentive Spirometry  - Assess the need for suctioning and aspirate as needed  - Assess and instruct to report SOB or any respiratory difficulty  - Respiratory Therapy support as indicated  Outcome: Adequate for Discharge     Problem: Nutrition/Hydration-ADULT  Goal: Nutrient/Hydration intake appropriate for improving, restoring or maintaining nutritional needs  Description: Monitor and assess patient's nutrition/hydration status for malnutrition  Collaborate with interdisciplinary team and initiate plan and interventions as ordered  Monitor patient's weight and dietary intake as ordered or per policy  Utilize nutrition screening tool and intervene as necessary  Determine patient's food preferences and provide high-protein, high-caloric foods as appropriate       INTERVENTIONS:  - Monitor oral intake, urinary output, labs, and treatment plans  - Assess nutrition and hydration status and recommend course of action  - Evaluate amount of meals eaten  - Assist patient with eating if necessary   - Allow adequate time for meals  - Recommend/ encourage appropriate diets, oral nutritional supplements, and vitamin/mineral supplements  - Order, calculate, and assess calorie counts as needed  - Recommend, monitor, and adjust tube feedings and TPN/PPN based on assessed needs  - Assess need for intravenous fluids  - Provide specific nutrition/hydration education as appropriate  - Include patient/family/caregiver in decisions related to nutrition  Outcome: Adequate for Discharge

## 2023-02-27 NOTE — ASSESSMENT & PLAN NOTE
· Presented to the ED on 2/26/2023 with lightheadedness   · Patient states he felt like he was having a "panic attack"  · 2/26/2023 EKG: Normal sinus rhythm heart rate 67, no signs of acute ischemia  · Patient does have a history of coronary artery disease with stenting  · Troponins resulted 9, 15, 21  · Patient monitored on telemetry-no events noted, NSR  · Ordered echo to be done outpatient

## 2023-02-27 NOTE — ASSESSMENT & PLAN NOTE
· Noted to have a white count 15 7 yesterday, today trending down to 10 5  · Unclear etiology, no obvious signs of infection, remains afebrile  · 2/26/2023 flu/RSV/COVID: Negative  · 2/26/2023 chest x-ray: No acute cardiopulmonary disease

## 2023-02-27 NOTE — ASSESSMENT & PLAN NOTE
Lab Results   Component Value Date    EGFR 80 02/27/2023    EGFR 52 02/26/2023    EGFR 79 11/05/2021    CREATININE 0 97 02/27/2023    CREATININE 1 37 (H) 02/26/2023    CREATININE 1 00 11/05/2021   · cr baseline  9-1 2  · Mildly elevated from baseline yesterday, today returned to baseline  · Patient received IV fluids overnight, will discontinue

## 2023-02-27 NOTE — DISCHARGE SUMMARY
[facility]  Discharge- Vel Farah 1955, 79 y o  male MRN: [id number]  Unit/Bed#: -01 Encounter: [id number]  Primary Care Provider: Dion Ramires PA-C   Date and time admitted to hospital: 2/26/2023 11:34 AM    * Lightheadedness  Assessment & Plan  · Presented to the ED on 2/26/2023 with lightheadedness   · Patient states he felt like he was having a "panic attack"  · 2/26/2023 EKG: Normal sinus rhythm heart rate 67, no signs of acute ischemia  · Patient does have a history of coronary artery disease with stenting  · Troponins resulted 9, 15, 21  · Patient monitored on telemetry-no events noted, NSR  · Ordered echo to be done outpatient      Generalized anxiety disorder  Assessment & Plan  · No anxiety at present time  · Continue BuSpar on discharge    Numbness  Assessment & Plan  · Presented with left sided numbness that resolved shortly after arrival  · Denies any left-sided numbness this a m   · 2/26/2023 CTA head and neck: No acute intracranial abnormality, no cervical or intracranial large vessel occlusion, stable severe stenosis at the origin of the right ICA secondary to calcified and noncalcified plaque  No hemodynamically significant stenosis of the left cervical carotid bifurcation  Moderate stenosis at the ECA origin    · Patient declined MRI           Leukocytosis  Assessment & Plan  · Noted to have a white count 15 7 yesterday, today trending down to 10 5  · Unclear etiology, no obvious signs of infection, remains afebrile  · 2/26/2023 flu/RSV/COVID: Negative  · 2/26/2023 chest x-ray: No acute cardiopulmonary disease      Coronary artery disease involving native coronary artery of native heart with angina pectoris ([facility] [address] )  Assessment & Plan  · continue aspirin, statin, beta-blocker on discharge    Stage 3 chronic kidney disease, unspecified whether stage 3a or 3b CKD [facility])  Assessment & Plan  Lab Results   Component Value Date    EGFR 80 02/27/2023    EGFR 52 02/26/2023    EGFR 79 11/05/2021    CREATININE 0 97 02/27/2023    CREATININE 1 37 (H) 02/26/2023    CREATININE 1 00 11/05/2021   · cr baseline  9-1 2  · Mildly elevated from baseline yesterday, today returned to baseline  · Patient received IV fluids overnight, will discontinue    Tobacco abuse  Assessment & Plan  · Encourage smoking cessation       Dyslipidemia  Assessment & Plan  · Continue statin on discharge        Discharging Physician / Practitioner: TAMMY Donovan  PCP: Blayne Kim PA-C  Admission Date:   Admission Orders (From admission, onward)     Ordered        02/26/23 1536  Place in Observation  Once                      Discharge Date: 02/27/23    Medical Problems     Resolved Problems  Date Reviewed: 2/27/2023   None         Consultations During Hospital Stay:  · None    Procedures Performed:   · None    Significant Findings / Test Results:   · 2/26/2023 CTA head and neck: No acute intracranial abnormality, no cervical or intracranial large vessel occlusion, stable severe stenosis at the origin of the right ICA secondary to calcified and noncalcified plaque  No hemodynamically significant stenosis of the left cervical carotid bifurcation  Moderate stenosis at the ECA origin  · 2/26/2023 CTA chest abdomen pelvis: No evidence of aortic aneurysm or aortic dissection    Incidental Findings:   · None    Test Results Pending at Discharge (will require follow up): · None     Outpatient Tests Requested:  · Echocardiogram with results to PCP    Complications: None    Reason for Admission: Sonia Course:     John Agee is a 79 y o  male patient who originally presented to the hospital on 2/26/2023 due to lightheadedness and left-sided numbness that resolved after arrival to the ED  Of note, patient does have a history of CAD with stenting  CTA  Head unremarkable, CTA chest negative as well  Patient refused MRI   Symptoms resolved today, will discharge home with order for outpatient echo and follow up with PCP in a week  Please see above list of diagnoses and related plan for additional information  Condition at Discharge: good     Discharge Day Visit / Exam:     Subjective:  "I feel great  I want to go home before it snows today "  Vitals: Blood Pressure: 146/78 (02/27/23 0626)  Pulse: 63 (02/27/23 0626)  Temperature: 98 1 °F (36 7 °C) (02/27/23 0626)  Temp Source: Oral (02/27/23 0230)  Respirations: 20 (02/27/23 0626)  Height: 5' 7" (170 2 cm) (02/26/23 1137)  Weight - Scale: 77 9 kg (171 lb 11 8 oz) (02/27/23 0600)  SpO2: 94 % (02/27/23 0626)  Exam:   Physical Exam  Vitals and nursing note reviewed  Constitutional:       General: He is not in acute distress  Appearance: Normal appearance  He is well-developed  He is not ill-appearing  HENT:      Head: Normocephalic and atraumatic  Nose: Nose normal       Mouth/Throat:      Mouth: Mucous membranes are moist    Eyes:      Extraocular Movements: Extraocular movements intact  Conjunctiva/sclera: Conjunctivae normal       Pupils: Pupils are equal, round, and reactive to light  Cardiovascular:      Rate and Rhythm: Normal rate and regular rhythm  Pulses: Normal pulses  Heart sounds: Normal heart sounds  No murmur heard  Pulmonary:      Effort: Pulmonary effort is normal  No respiratory distress  Breath sounds: Normal breath sounds  Abdominal:      General: Bowel sounds are normal       Palpations: Abdomen is soft  Tenderness: There is no abdominal tenderness  Musculoskeletal:         General: No swelling  Normal range of motion  Cervical back: Normal range of motion and neck supple  Skin:     General: Skin is warm and dry  Capillary Refill: Capillary refill takes less than 2 seconds  Neurological:      General: No focal deficit present  Mental Status: He is alert and oriented to person, place, and time  Mental status is at baseline  Cranial Nerves:  No cranial nerve deficit  Sensory: No sensory deficit  Motor: No weakness  Coordination: Coordination normal       Gait: Gait normal    Psychiatric:         Mood and Affect: Mood normal          Behavior: Behavior normal          Thought Content: Thought content normal          Judgment: Judgment normal          Discussion with Family: Discussed treatment course with patient  He expressed understanding of all tests and treatment while inpatient  Patient refused MRI, will order follow up echo in out patient setting  Patient counseled on smoking cessation  He understands he is to follow up with his PCP in a week  Discharge instructions/Information to patient and family:   See after visit summary for information provided to patient and family  Provisions for Follow-Up Care:  See after visit summary for information related to follow-up care and any pertinent home health orders  Disposition:     Home    For Discharges to [facility] SNF:   · Not Applicable to this Patient - Not Applicable to this Patient    Planned Readmission: no       Discharge Statement:  I spent 45 minutes discharging the patient  This time was spent on the day of discharge  I had direct contact with the patient on the day of discharge  Greater than 50% of the total time was spent examining patient, answering all patient questions, arranging and discussing plan of care with patient as well as directly providing post-discharge instructions  Additional time then spent on discharge activities  Discharge Medications:  See after visit summary for reconciled discharge medications provided to patient and family        ** Please Note: This note has been constructed using a voice recognition system **

## 2023-02-27 NOTE — DISCHARGE INSTR - AVS FIRST PAGE
Please follow-up with your primary care physician within 1 week of discharge  Please schedule your outpatient echocardiogram  Encourage smoking cessation

## 2023-02-27 NOTE — PLAN OF CARE
Problem: PAIN - ADULT  Goal: Verbalizes/displays adequate comfort level or baseline comfort level  Description: Interventions:  - Encourage patient to monitor pain and request assistance  - Assess pain using appropriate pain scale  - Administer analgesics based on type and severity of pain and evaluate response  - Implement non-pharmacological measures as appropriate and evaluate response  - Consider cultural and social influences on pain and pain management  - Notify physician/advanced practitioner if interventions unsuccessful or patient reports new pain  Outcome: Progressing     Problem: INFECTION - ADULT  Goal: Absence or prevention of progression during hospitalization  Description: INTERVENTIONS:  - Assess and monitor for signs and symptoms of infection  - Monitor lab/diagnostic results  - Monitor all insertion sites, i e  indwelling lines, tubes, and drains  - Monitor endotracheal if appropriate and nasal secretions for changes in amount and color  - [place] appropriate cooling/warming therapies per order  - Administer medications as ordered  - Instruct and encourage patient and family to use good hand hygiene technique  - Identify and instruct in appropriate isolation precautions for identified infection/condition  Outcome: Progressing  Goal: Absence of fever/infection during neutropenic period  Description: INTERVENTIONS:  - Monitor WBC    Outcome: Progressing     Problem: SAFETY ADULT  Goal: Patient will remain free of falls  Description: INTERVENTIONS:  - Educate patient/family on patient safety including physical limitations  - Instruct patient to call for assistance with activity   - Consult OT/PT to assist with strengthening/mobility   - Keep Call bell within reach  - Keep bed low and locked with side rails adjusted as appropriate  - Keep care items and personal belongings within reach  - Initiate and maintain comfort rounds  - Make Fall Risk Sign visible to staff  - Offer Toileting every 2 Hours, in advance of need  - Initiate/Maintain alarm  - Obtain necessary fall risk management equipment:   - Apply yellow socks and bracelet for high fall risk patients  - Consider moving patient to room near nurses station  Outcome: Progressing  Goal: Maintain or return to baseline ADL function  Description: INTERVENTIONS:  -  Assess patient's ability to carry out ADLs; assess patient's baseline for ADL function and identify physical deficits which impact ability to perform ADLs (bathing, care of mouth/teeth, toileting, grooming, dressing, etc )  - Assess/evaluate cause of self-care deficits   - Assess range of motion  - Assess patient's mobility; develop plan if impaired  - Assess patient's need for assistive devices and provide as appropriate  - Encourage maximum independence but intervene and supervise when necessary  - Involve family in performance of ADLs  - Assess for home care needs following discharge   - Consider OT consult to assist with ADL evaluation and planning for discharge  - Provide patient education as appropriate  Outcome: Progressing  Goal: Maintains/Returns to pre admission functional level  Description: INTERVENTIONS:  - Perform BMAT or MOVE assessment daily    - Set and communicate daily mobility goal to care team and patient/family/caregiver  - Collaborate with rehabilitation services on mobility goals if consulted  - Perform Range of Motion 2 times a day  - Reposition patient every 2 hours    - Dangle patient 2 times a day  - Stand patient 2 times a day  - Ambulate patient 2 times a day  - Out of bed to chair 2 times a day   - Out of bed for meals 2 times a day  - Out of bed for toileting  - Record patient progress and toleration of activity level   Outcome: Progressing     Problem: DISCHARGE PLANNING  Goal: Discharge to home or other facility with appropriate resources  Description: INTERVENTIONS:  - Identify barriers to discharge w/patient and caregiver  - Arrange for needed discharge resources and transportation as appropriate  - Identify discharge learning needs (meds, wound care, etc )  - Arrange for interpretive services to assist at discharge as needed  - Refer to Case Management Department for coordinating discharge planning if the patient needs post-hospital services based on physician/advanced practitioner order or complex needs related to functional status, cognitive ability, or social support system  Outcome: Progressing     Problem: Knowledge Deficit  Goal: Patient/family/caregiver demonstrates understanding of disease process, treatment plan, medications, and discharge instructions  Description: Complete learning assessment and assess knowledge base    Interventions:  - Provide teaching at level of understanding  - Provide teaching via preferred learning methods  Outcome: Progressing     Problem: CARDIOVASCULAR - ADULT  Goal: Maintains optimal cardiac output and hemodynamic stability  Description: INTERVENTIONS:  - Monitor I/O, vital signs and rhythm  - Monitor for S/S and trends of decreased cardiac output  - Administer and titrate ordered vasoactive medications to optimize hemodynamic stability  - Assess quality of pulses, skin color and temperature  - Assess for signs of decreased coronary artery perfusion  - Instruct patient to report change in severity of symptoms  Outcome: Progressing  Goal: Absence of cardiac dysrhythmias or at baseline rhythm  Description: INTERVENTIONS:  - Continuous cardiac monitoring, vital signs, obtain 12 lead EKG if ordered  - Administer antiarrhythmic and heart rate control medications as ordered  - Monitor electrolytes and administer replacement therapy as ordered  Outcome: Progressing     Problem: RESPIRATORY - ADULT  Goal: Achieves optimal ventilation and oxygenation  Description: INTERVENTIONS:  - Assess for changes in respiratory status  - Assess for changes in mentation and behavior  - Position to facilitate oxygenation and minimize respiratory effort  - Oxygen administered by appropriate delivery if ordered  - Initiate smoking cessation education as indicated  - Encourage broncho-pulmonary hygiene including cough, deep breathe, Incentive Spirometry  - Assess the need for suctioning and aspirate as needed  - Assess and instruct to report SOB or any respiratory difficulty  - Respiratory Therapy support as indicated  Outcome: Progressing     Problem: Nutrition/Hydration-ADULT  Goal: Nutrient/Hydration intake appropriate for improving, restoring or maintaining nutritional needs  Description: Monitor and assess patient's nutrition/hydration status for malnutrition  Collaborate with interdisciplinary team and initiate plan and interventions as ordered  Monitor patient's weight and dietary intake as ordered or per policy  Utilize nutrition screening tool and intervene as necessary  Determine patient's food preferences and provide high-protein, high-caloric foods as appropriate       INTERVENTIONS:  - Monitor oral intake, urinary output, labs, and treatment plans  - Assess nutrition and hydration status and recommend course of action  - Evaluate amount of meals eaten  - Assist patient with eating if necessary   - Allow adequate time for meals  - Recommend/ encourage appropriate diets, oral nutritional supplements, and vitamin/mineral supplements  - Order, calculate, and assess calorie counts as needed  - Recommend, monitor, and adjust tube feedings and TPN/PPN based on assessed needs  - Assess need for intravenous fluids  - Provide specific nutrition/hydration education as appropriate  - Include patient/family/caregiver in decisions related to nutrition  Outcome: Progressing

## 2023-02-27 NOTE — CASE MANAGEMENT
Case Management Assessment & Discharge Planning Note    Patient name Sami Farias  Location /-73 MRN [id number]  : 1955 Date 2023       Current Admission Date: 2023  Current Admission Diagnosis:Lightheadedness   Patient Active Problem List    Diagnosis Date Noted   • Lightheadedness 2023   • Numbness 2023   • Stage 3 chronic kidney disease, unspecified whether stage 3a or 3b CKD ([place] [address] ) 2023   • Hyperlipidemia associated with type 2 diabetes mellitus ([place] [address] ) 2022   • Cigarette nicotine dependence without complication    • Arthritis of right knee 2021   • Claudication of calf muscles ([place] [address] ) 2021   • Hypertensive heart disease without heart failure 2021   • Coronary artery disease involving native coronary artery of native heart with angina pectoris ([place] [address] ) 2020   • Generalized anxiety disorder 2020   • Cardiomyopathy ([place] [address] ) 2020   • Tobacco abuse 05/10/2020   • Chest pain 2020   • Diabetes mellitus type 2 in nonobese ([place] [address] ) 2020   • Hypertension complicating diabetes ([place] [address] ) 2019   • Hyperosmolar hyponatremia 2019   • Dyslipidemia 2019   • Abnormal transaminases 2019   • Leukocytosis 2019      LOS (days): 0  Geometric Mean LOS (GMLOS) (days):   Days to GMLOS:     OBJECTIVE:              Current admission status: Observation       Preferred Pharmacy:   [id number] - [place]  [address]ER RAMOS [address] [facility] [address]  [place]  [address] [place] [address]  Phone: [phone] Fax: [phone]    Primary Care Provider: Enrike Guajardo PA-C    Primary Insurance: MEDICARE  Secondary Insurance:     ASSESSMENT:  Anatoly Ng Proxies    There are no active Health Care Proxies on file         Advance Directives  Does patient have a [address] [place] [address] Avenue?: No  Was patient offered paperwork?: Yes ( supplied info)  Does patient currently have a Health Care decision maker?: Yes, please see Health Care Proxy section  Does patient have Advance Directives?: No  Was patient offered paperwork?: Yes (CM supplied info)  Primary Contact: wife Amish Chahal    Obs Notice Signed: 02/27/23    Readmission Root Cause  30 Day Readmission: No    Patient Information  Admitted from[de-identified] Home  Mental Status: Alert  During Assessment patient was accompanied by: Not accompanied during assessment  Assessment information provided by[de-identified] Patient  Primary Caregiver: Self  Support Systems: Spouse/significant other  [place] of Residence: [place] [address] do you live in?: [facility] entry access options  Select all that apply : Stairs  Number of steps to enter home  : 1  Do the steps have railings?: No  Type of Current Residence: [place]  In the last 12 months, was there a time when you were not able to pay the mortgage or rent on time?: No  In the last 12 months, how many places have you lived?: 1  In the last 12 months, was there a time when you did not have a steady place to sleep or slept in a shelter (including now)?: No  Homeless/housing insecurity resource given?: No  Living Arrangements: Lives w/ Spouse/significant other  Is patient a ?: No    Activities of Daily Living Prior to Admission  Functional Status: Independent  Completes ADLs independently?: Yes  Ambulates independently?: Yes  Does patient use assisted devices?: No  Does patient currently own DME?: No  Does patient have a history of Outpatient Therapy (PT/OT)?: Yes  Does the patient have a history of Short-Term Rehab?: No  Does patient have a history of HHC?: No  [facility] ?: No         Patient Information Continued  Income Source: Pension/[employer]  Does patient have prescription coverage?: Yes  Within the past 12 months, you worried that your food would run out before you got the money to buy more : Never true  Within the past 12 months, the food you bought just didn't last and you didn't have money to get more : Never true  Food insecurity resource given?: No  Does patient receive dialysis treatments?: No  Does patient have a history of substance abuse?: No  Does patient have a history of Mental Health Diagnosis?: Yes  Is patient receiving treatment for mental health?: Yes (Pt takes Buspar fro anxiety and panic attacks-ordered by his PCP)  Has patient received inpatient treatment related to mental health in the last 2 years?: No    PHQ 2/9 Screening   Reviewed PHQ 2/9 Depression Screening Score?: No    Means of Transportation  Means of Transport to Appts[de-identified] Drives Self  In the past 12 months, has lack of transportation kept you from medical appointments or from getting medications?: No  In the past 12 months, has lack of transportation kept you from meetings, work, or from getting things needed for daily living?: No  Was application for public transport provided?: No        DISCHARGE DETAILS:    Discharge planning discussed with[de-identified] patient  Freedom of Choice: Yes  Comments - Freedom of Choice: CM discussed d/c needs and none were identified  CM contacted family/caregiver?: No- see comments (pt will update his wife himself)  Were Treatment Team discharge recommendations reviewed with patient/caregiver?: Yes  Did patient/caregiver verbalize understanding of patient care needs?: Yes  Were patient/caregiver advised of the risks associated with not following Treatment Team discharge recommendations?: Yes    Contacts  Patient Contacts: Jessica Vela  Relationship to Patient[de-identified] [address]         Is the patient interested in [facility] [address] at discharge?: No    DME Referral Provided  Referral made for DME?: No    Other Referral/Resources/Interventions Provided:  Interventions: Advanced Directives  Referral Comments: No anticipated d/c needs/CM supplied info on Advanced Directive and POAs    Would you like to participate in our [address] service program?  : No - Declined    Treatment Team Recommendation: Home  Discharge Destination Plan[de-identified] Home  Transport at Discharge : Family

## 2023-02-27 NOTE — ASSESSMENT & PLAN NOTE
· Presented with left sided numbness that resolved shortly after arrival  · Denies any left-sided numbness this a m   · 2/26/2023 CTA head and neck: No acute intracranial abnormality, no cervical or intracranial large vessel occlusion, stable severe stenosis at the origin of the right ICA secondary to calcified and noncalcified plaque  No hemodynamically significant stenosis of the left cervical carotid bifurcation  Moderate stenosis at the ECA origin    · Patient declined MRI

## 2023-03-28 DIAGNOSIS — E78.5 DYSLIPIDEMIA: ICD-10-CM

## 2023-03-28 DIAGNOSIS — F41.1 GENERALIZED ANXIETY DISORDER: ICD-10-CM

## 2023-03-28 RX ORDER — BUSPIRONE HYDROCHLORIDE 10 MG/1
TABLET ORAL
Qty: 60 TABLET | Refills: 5 | Status: SHIPPED | OUTPATIENT
Start: 2023-03-28

## 2023-03-28 RX ORDER — ATORVASTATIN CALCIUM 40 MG/1
TABLET, FILM COATED ORAL
Qty: 30 TABLET | Refills: 2 | Status: SHIPPED | OUTPATIENT
Start: 2023-03-28

## 2023-04-04 ENCOUNTER — APPOINTMENT (OUTPATIENT)
Dept: LAB | Facility: CLINIC | Age: 68
End: 2023-04-04

## 2023-04-04 DIAGNOSIS — E11.69 HYPERLIPIDEMIA ASSOCIATED WITH TYPE 2 DIABETES MELLITUS (HCC): ICD-10-CM

## 2023-04-04 DIAGNOSIS — E78.5 HYPERLIPIDEMIA ASSOCIATED WITH TYPE 2 DIABETES MELLITUS (HCC): ICD-10-CM

## 2023-04-04 DIAGNOSIS — E11.9 DIABETES MELLITUS TYPE 2 IN NONOBESE (HCC): ICD-10-CM

## 2023-04-04 LAB
ALBUMIN SERPL BCP-MCNC: 4.2 G/DL (ref 3.5–5)
ALP SERPL-CCNC: 48 U/L (ref 46–116)
ALT SERPL W P-5'-P-CCNC: 30 U/L (ref 12–78)
ANION GAP SERPL CALCULATED.3IONS-SCNC: 4 MMOL/L (ref 4–13)
AST SERPL W P-5'-P-CCNC: 16 U/L (ref 5–45)
BASOPHILS # BLD AUTO: 0.08 THOUSANDS/ÂΜL (ref 0–0.1)
BASOPHILS NFR BLD AUTO: 1 % (ref 0–1)
BILIRUB SERPL-MCNC: 0.21 MG/DL (ref 0.2–1)
BUN SERPL-MCNC: 24 MG/DL (ref 5–25)
CALCIUM SERPL-MCNC: 9.8 MG/DL (ref 8.3–10.1)
CHLORIDE SERPL-SCNC: 108 MMOL/L (ref 96–108)
CHOLEST SERPL-MCNC: 141 MG/DL
CO2 SERPL-SCNC: 27 MMOL/L (ref 21–32)
CREAT SERPL-MCNC: 1.02 MG/DL (ref 0.6–1.3)
CREAT UR-MCNC: 98.1 MG/DL
EOSINOPHIL # BLD AUTO: 0.38 THOUSAND/ÂΜL (ref 0–0.61)
EOSINOPHIL NFR BLD AUTO: 4 % (ref 0–6)
ERYTHROCYTE [DISTWIDTH] IN BLOOD BY AUTOMATED COUNT: 13.3 % (ref 11.6–15.1)
GFR SERPL CREATININE-BSD FRML MDRD: 75 ML/MIN/1.73SQ M
GLUCOSE P FAST SERPL-MCNC: 126 MG/DL (ref 65–99)
HCT VFR BLD AUTO: 44.5 % (ref 36.5–49.3)
HDLC SERPL-MCNC: 46 MG/DL
HGB BLD-MCNC: 14.2 G/DL (ref 12–17)
IMM GRANULOCYTES # BLD AUTO: 0.04 THOUSAND/UL (ref 0–0.2)
IMM GRANULOCYTES NFR BLD AUTO: 0 % (ref 0–2)
LDLC SERPL CALC-MCNC: 61 MG/DL (ref 0–100)
LYMPHOCYTES # BLD AUTO: 2.64 THOUSANDS/ÂΜL (ref 0.6–4.47)
LYMPHOCYTES NFR BLD AUTO: 25 % (ref 14–44)
MCH RBC QN AUTO: 30.7 PG (ref 26.8–34.3)
MCHC RBC AUTO-ENTMCNC: 31.9 G/DL (ref 31.4–37.4)
MCV RBC AUTO: 96 FL (ref 82–98)
MICROALBUMIN UR-MCNC: 88.8 MG/L (ref 0–20)
MICROALBUMIN/CREAT 24H UR: 91 MG/G CREATININE (ref 0–30)
MONOCYTES # BLD AUTO: 1.31 THOUSAND/ÂΜL (ref 0.17–1.22)
MONOCYTES NFR BLD AUTO: 13 % (ref 4–12)
NEUTROPHILS # BLD AUTO: 6.06 THOUSANDS/ÂΜL (ref 1.85–7.62)
NEUTS SEG NFR BLD AUTO: 57 % (ref 43–75)
NRBC BLD AUTO-RTO: 0 /100 WBCS
PLATELET # BLD AUTO: 185 THOUSANDS/UL (ref 149–390)
PMV BLD AUTO: 12 FL (ref 8.9–12.7)
POTASSIUM SERPL-SCNC: 4.6 MMOL/L (ref 3.5–5.3)
PROT SERPL-MCNC: 7.7 G/DL (ref 6.4–8.4)
RBC # BLD AUTO: 4.62 MILLION/UL (ref 3.88–5.62)
SODIUM SERPL-SCNC: 139 MMOL/L (ref 135–147)
TRIGL SERPL-MCNC: 171 MG/DL
WBC # BLD AUTO: 10.51 THOUSAND/UL (ref 4.31–10.16)

## 2023-04-05 LAB
EST. AVERAGE GLUCOSE BLD GHB EST-MCNC: 123 MG/DL
HBA1C MFR BLD: 5.9 %

## 2023-05-22 DIAGNOSIS — I10 ESSENTIAL HYPERTENSION: ICD-10-CM

## 2023-05-22 RX ORDER — LISINOPRIL 40 MG/1
TABLET ORAL
Qty: 30 TABLET | Refills: 0 | Status: SHIPPED | OUTPATIENT
Start: 2023-05-22

## 2023-06-19 DIAGNOSIS — I10 ESSENTIAL HYPERTENSION: ICD-10-CM

## 2023-06-19 DIAGNOSIS — E78.5 DYSLIPIDEMIA: ICD-10-CM

## 2023-06-19 RX ORDER — LISINOPRIL 40 MG/1
TABLET ORAL
Qty: 30 TABLET | Refills: 0 | Status: SHIPPED | OUTPATIENT
Start: 2023-06-19

## 2023-06-19 RX ORDER — ATORVASTATIN CALCIUM 40 MG/1
TABLET, FILM COATED ORAL
Qty: 90 TABLET | Refills: 0 | Status: SHIPPED | OUTPATIENT
Start: 2023-06-19

## 2023-07-12 DIAGNOSIS — E11.9 DIABETES MELLITUS TYPE 2 IN NONOBESE (HCC): ICD-10-CM

## 2023-07-12 DIAGNOSIS — I10 ESSENTIAL HYPERTENSION: ICD-10-CM

## 2023-07-12 RX ORDER — LISINOPRIL 40 MG/1
TABLET ORAL
Qty: 30 TABLET | Refills: 0 | Status: SHIPPED | OUTPATIENT
Start: 2023-07-12

## 2023-07-12 RX ORDER — AMLODIPINE BESYLATE 10 MG/1
TABLET ORAL
Qty: 90 TABLET | Refills: 1 | Status: SHIPPED | OUTPATIENT
Start: 2023-07-12

## 2023-07-25 DIAGNOSIS — E11.00 HYPEROSMOLAR NON-KETOTIC STATE IN PATIENT WITH TYPE 2 DIABETES MELLITUS (HCC): ICD-10-CM

## 2023-07-25 RX ORDER — GLIMEPIRIDE 2 MG/1
2 TABLET ORAL
Qty: 90 TABLET | Refills: 1 | Status: SHIPPED | OUTPATIENT
Start: 2023-07-25

## 2023-08-16 ENCOUNTER — OFFICE VISIT (OUTPATIENT)
Dept: CARDIOLOGY CLINIC | Facility: CLINIC | Age: 68
End: 2023-08-16
Payer: MEDICARE

## 2023-08-16 VITALS
WEIGHT: 165 LBS | DIASTOLIC BLOOD PRESSURE: 78 MMHG | SYSTOLIC BLOOD PRESSURE: 140 MMHG | BODY MASS INDEX: 25.9 KG/M2 | HEART RATE: 70 BPM | OXYGEN SATURATION: 96 % | HEIGHT: 67 IN

## 2023-08-16 DIAGNOSIS — I15.2 HYPERTENSION COMPLICATING DIABETES (HCC): ICD-10-CM

## 2023-08-16 DIAGNOSIS — E11.59 HYPERTENSION COMPLICATING DIABETES (HCC): ICD-10-CM

## 2023-08-16 DIAGNOSIS — E11.69 HYPERLIPIDEMIA ASSOCIATED WITH TYPE 2 DIABETES MELLITUS (HCC): ICD-10-CM

## 2023-08-16 DIAGNOSIS — E11.9 DIABETES MELLITUS TYPE 2 IN NONOBESE (HCC): ICD-10-CM

## 2023-08-16 DIAGNOSIS — I73.9 CLAUDICATION OF BOTH LOWER EXTREMITIES (HCC): ICD-10-CM

## 2023-08-16 DIAGNOSIS — I10 PRIMARY HYPERTENSION: ICD-10-CM

## 2023-08-16 DIAGNOSIS — E78.5 HYPERLIPIDEMIA ASSOCIATED WITH TYPE 2 DIABETES MELLITUS (HCC): ICD-10-CM

## 2023-08-16 DIAGNOSIS — I42.9 CARDIOMYOPATHY, UNSPECIFIED TYPE (HCC): Primary | ICD-10-CM

## 2023-08-16 DIAGNOSIS — N18.30 STAGE 3 CHRONIC KIDNEY DISEASE, UNSPECIFIED WHETHER STAGE 3A OR 3B CKD (HCC): ICD-10-CM

## 2023-08-16 DIAGNOSIS — I25.119 CORONARY ARTERY DISEASE INVOLVING NATIVE CORONARY ARTERY OF NATIVE HEART WITH ANGINA PECTORIS (HCC): ICD-10-CM

## 2023-08-16 DIAGNOSIS — I11.9 HYPERTENSIVE HEART DISEASE WITHOUT HEART FAILURE: ICD-10-CM

## 2023-08-16 PROCEDURE — 99214 OFFICE O/P EST MOD 30 MIN: CPT | Performed by: INTERNAL MEDICINE

## 2023-08-16 RX ORDER — CARVEDILOL 12.5 MG/1
25 TABLET ORAL 2 TIMES DAILY WITH MEALS
Qty: 180 TABLET | Refills: 4 | Status: SHIPPED | OUTPATIENT
Start: 2023-08-16

## 2023-08-16 NOTE — PROGRESS NOTES
Cardiology Follow Up    Wilton Carey  1955  [id number]  [facility] [address] [address] [phone]        Interval History: 20-year-old male with coronary artery disease status post PCI of LP L1 on 11/11/2021 with drug-eluting stent x1, hypertension, hyperlipidemia, type 2 diabetes mellitus, JOELLE, smoker, left bundle-branch block presented for cardiology follow-up. Stress was done preop knee surgery and lead to the stent. Since last office visit he was seen in the emergency room 2/26/23 with symptoms of sob, cp, dizziness, numbness, L face tingling,  panic attack. Troponins were negative. EKG with chronic left bundle branch block. An echocardiogram was ordered but not performed. CTA was negative for aortic aneurysm or aortic dissection. CTA of the head revealed no acute intracranial abnormality. Stable severe stenosis at the origin of the right ICA. He now feels well. No cp, sob, mild benitez. He does c/o R > L bilateral calf pain when walking, colette a couple hundred yards, relieved by rest.     Cardiac cath November 11, 2021:  • 1st LPL lesion is 90% stenosed. Left dominant coronary system   LP L1 90% stenosis status post successful PCI with drug-eluting stent x1  Mid circumflex 60 % stenosis     Lexiscan nuclear stress test October 27, 2021:  1. Resting and post EKG shows left bundle branch block  2. No chest discomfort with lexiscan  3. There is a moderate to large moderate intensity fixed inferoseptal and inferoapical defect indicating prior infarct  4. Inferoapical dyskinesis and inferoseptal hypokinesis.   Estimated ejection fraction=52%         Patient Active Problem List   Diagnosis   • Hypertension complicating diabetes ([address])   • Hyperosmolar hyponatremia   • Dyslipidemia   • Abnormal transaminases   • Leukocytosis   • Chest pain   • Diabetes mellitus type 2 in nonobese ([facility])   • Tobacco abuse   • Cardiomyopathy ([address] [facility])   • Coronary artery disease involving native coronary artery of native heart with angina pectoris ([facility])   • Generalized anxiety disorder   • Arthritis of right knee   • Claudication of calf muscles ([facility])   • Hypertensive heart disease without heart failure   • Hyperlipidemia associated with type 2 diabetes mellitus ([facility])   • Cigarette nicotine dependence without complication   • Stage 3 chronic kidney disease, unspecified whether stage 3a or 3b CKD ([facility])   • Lightheadedness   • Numbness     Past Medical History:   Diagnosis Date   • Anxiety    • Arthritis    • Diabetes mellitus ([address] [facility])    • Hyperlipidemia    • Hyperosmolar non-ketotic state in patient with type 2 diabetes mellitus ([facility]) 12/05/2019   • Hypertension    • Type 2 diabetes mellitus with hyperglycemia, with long-term current use of insulin ([facility]) 05/08/2020     Social History     Socioeconomic History   • Marital status: /Civil Union     Spouse name: Not on file   • Number of children: Not on file   • Years of education: Not on file   • Highest education level: Not on file   Occupational History   • Not on file   Tobacco Use   • Smoking status: Light Smoker     Packs/day: 0.50     Years: 40.00     Total pack years: 20.00     Types: Cigarettes   • Smokeless tobacco: Never   Vaping Use   • Vaping Use: Never used   Substance and Sexual Activity   • Alcohol use: Yes     Comment: occasionally   • Drug use: Never   • Sexual activity: Not Currently     Partners: Female     Birth control/protection: None   Other Topics Concern   • Not on file   Social History Narrative   • Not on file     Social Determinants of Health     Financial Resource Strain: Not on file   Food Insecurity: No Food Insecurity (2/27/2023)    Hunger Vital Sign    • Worried About Running Out of Food in the Last Year: Never true    • Ran Out of Food in the Last Year: Never true   Transportation Needs: No Transportation Needs (2/27/2023)    PRAPARE - Transportation    • Lack of Transportation (Medical): No    • Lack of Transportation (Non-Medical): No   Physical Activity: Not on file   Stress: Not on file   Social Connections: Not on file   Intimate Partner Violence: Not on file   Housing Stability: Low Risk  (2/27/2023)    Housing Stability Vital Sign    • Unable to Pay for Housing in the Last Year: No    • Number of Places Lived in the Last Year: 1    • Unstable Housing in the Last Year: No      Family History   Problem Relation Age of Onset   • Diabetes Mother    • Hyperlipidemia Mother    • Dementia Mother    • Hyperlipidemia Father      Past Surgical History:   Procedure Laterality Date   • CARDIAC CATHETERIZATION N/A 11/11/2021    Procedure: Cardiac catheterization;  Surgeon: Ricarda Stewart MD;  Location: [address] C[address] LAB; Service: Cardiology   • CARDIAC CATHETERIZATION Left 11/11/2021    Procedure: Cardiac Coronary Angiogram;  Surgeon: Ricarda Stewart MD;  Location: [address] C[address] LAB;   Service: Cardiology   • COLONOSCOPY  2010   • KNEE SURGERY Right        Current Outpatient Medications:   •  amLODIPine (NORVASC) 10 mg tablet, TAKE ONE TABLET BY MOUTH EVERY DAY, Disp: 90 tablet, Rfl: 1  •  aspirin 81 mg chewable tablet, Chew 1 tablet (81 mg total) daily, Disp: 90 tablet, Rfl: 1  •  atorvastatin (LIPITOR) 40 mg tablet, TAKE ONE TABLET BY MOUTH EVERY DAY WITH DINNER, Disp: 90 tablet, Rfl: 0  •  busPIRone (BUSPAR) 10 mg tablet, TAKE ONE TABLET BY MOUTH TWICE A DAY, Disp: 60 tablet, Rfl: 5  •  carvedilol (COREG) 12.5 mg tablet, Take 1 tablet (12.5 mg total) by mouth 2 (two) times a day with meals, Disp: 180 tablet, Rfl: 4  •  chlorthalidone 25 mg tablet, Take 1 tablet (25 mg total) by mouth daily, Disp: 90 tablet, Rfl: 1  •  glimepiride (AMARYL) 2 mg tablet, TAKE ONE TABLET BY MOUTH EVERY DAY WITH BREAKFAST., Disp: 90 tablet, Rfl: 1  •  lisinopril (ZESTRIL) 40 mg tablet, TAKE ONE TABLET BY MOUTH EVERY DAY, Disp: 30 tablet, Rfl: 0  •  metFORMIN (GLUCOPHAGE) 500 mg tablet, TAKE ONE TABLET BY MOUTH TWICE A DAY WITH MEALS, Disp: 90 tablet, Rfl: 2  •  Multiple Vitamins-Minerals (MULTIVITAMIN WITH MINERALS) tablet, Take 1 tablet by mouth daily, Disp: , Rfl:   No Known Allergies    Labs:  No visits with results within 2 Month(s) from this visit.    Latest known visit with results is:   Appointment on 04/04/2023   Component Date Value   • Hemoglobin A1C 04/04/2023 5.9 (H)    • EAG 04/04/2023 123    • Sodium 04/04/2023 139    • Potassium 04/04/2023 4.6    • Chloride 04/04/2023 108    • CO2 04/04/2023 27    • ANION GAP 04/04/2023 4    • BUN 04/04/2023 24    • Creatinine 04/04/2023 1.02    • Glucose, Fasting 04/04/2023 126 (H)    • Calcium 04/04/2023 9.8    • AST 04/04/2023 16    • ALT 04/04/2023 30    • Alkaline Phosphatase 04/04/2023 48    • Total Protein 04/04/2023 7.7    • Albumin 04/04/2023 4.2    • Total Bilirubin 04/04/2023 0.21    • eGFR 04/04/2023 75    • WBC 04/04/2023 10.51 (H)    • RBC 04/04/2023 4.62    • Hemoglobin 04/04/2023 14.2    • Hematocrit 04/04/2023 44.5    • MCV 04/04/2023 96    • MCH 04/04/2023 30.7    • MCHC 04/04/2023 31.9    • RDW 04/04/2023 13.3    • MPV 04/04/2023 12.0    • Platelets 33/28/2672 185    • nRBC 04/04/2023 0    • Neutrophils Relative 04/04/2023 57    • Immat GRANS % 04/04/2023 0    • Lymphocytes Relative 04/04/2023 25    • Monocytes Relative 04/04/2023 13 (H)    • Eosinophils Relative 04/04/2023 4    • Basophils Relative 04/04/2023 1    • Neutrophils Absolute 04/04/2023 6.06    • Immature Grans Absolute 04/04/2023 0.04    • Lymphocytes Absolute 04/04/2023 2.64    • Monocytes Absolute 04/04/2023 1.31 (H)    • Eosinophils Absolute 04/04/2023 0.38    • Basophils Absolute 04/04/2023 0.08    • Cholesterol 04/04/2023 141    • Triglycerides 04/04/2023 171 (H)    • HDL, Direct 04/04/2023 46    • LDL Calculated 04/04/2023 61    • Creatinine, Ur 04/04/2023 98.1    • Albumin,U,Random 04/04/2023 88.8 (H)    • Albumin Creat Ratio 04/04/2023 91 (H)      Imaging: No results found. Review of Systems:  Review of Systems review of systems is negative    Physical Exam:  Physical Exam GEN: Alert and oriented x 3, in no acute distress. Well appearing and well nourished. HEENT: Sclera anicteric, conjunctivae pink, mucous membranes moist. Oropharynx clear. NECK: Supple, no carotid bruits, no significant JVD. Trachea midline, no thyromegaly. HEART: Regular rhythm, normal S1 and S2, 2/6 systolic murmur, clicks, gallops or rubs. PMI nondisplaced, no thrills. LUNGS: Clear to auscultation bilaterally; no wheezes, rales, or rhonchi. No increased work of breathing or signs of respiratory distress. ABDOMEN: Soft, nontender, nondistended, normoactive bowel sounds. EXTREMITIES: Skin warm and well perfused, no clubbing, cyanosis, or edema. NEURO: No focal findings. Normal speech. Mood and affect normal.   SKIN: Normal without suspicious lesions on exposed skin. CAD status post coronary stenting  Hypertension  Hyperlipidemia  Type 2 diabetes  Smoker  Left bundle branch block  Claudication    Discussion/Summary: His leg symptoms do sound like claudication bilaterally. Check arterial vascular us. Management depending on results. Continue aggressive medical therapy and risk factor reduction. Continue asa. Continue amlodipine, carvedilol, chlorthalidone, lisinopril and diabetic meds. Smoking cessation advised. Exercise program recommended. Cholesterol is excellent and BP a bit elevated. Increase coreg to 25 mg bid. Should he require knee surgery he can do over 4 METS of activity without angina and without cardiovascular symptoms. Therefore he is stable from a cardiovascular standpoint to proceed with surgery. Moderate risk given his history. Recommend aggressive risk factor modification and therapeutic lifestyle changes.   Low-salt, low-calorie, low-fat, low-cholesterol diet with regular exercise and to optimize weight. I will defer the ordering and monitoring of necessity lab studies to you, but I am available and happy to review and manage any of the data at your request in the future. Discussed concepts of atherosclerosis, including signs and symptoms of cardiac disease. Previous studies were reviewed. Safety measures were reviewed. All questions were entertained and answered. Patient was advised to report any problems requiring medical attention. Follow-up with PCP and appropriate specialist and lab work as discussed. Return for follow up visit as scheduled or earlier, if needed. Thank you for allowing me to participate in the care and evaluation of your patient. Should you have any questions, please feel free to contact me.

## 2023-08-21 DIAGNOSIS — I10 ESSENTIAL HYPERTENSION: ICD-10-CM

## 2023-08-21 RX ORDER — LISINOPRIL 40 MG/1
TABLET ORAL
Qty: 30 TABLET | Refills: 0 | Status: SHIPPED | OUTPATIENT
Start: 2023-08-21

## 2023-08-31 ENCOUNTER — OFFICE VISIT (OUTPATIENT)
Dept: FAMILY MEDICINE CLINIC | Facility: CLINIC | Age: 68
End: 2023-08-31
Payer: MEDICARE

## 2023-08-31 VITALS
HEART RATE: 67 BPM | TEMPERATURE: 97.3 F | DIASTOLIC BLOOD PRESSURE: 82 MMHG | OXYGEN SATURATION: 100 % | SYSTOLIC BLOOD PRESSURE: 123 MMHG | BODY MASS INDEX: 25.94 KG/M2 | HEIGHT: 67 IN | WEIGHT: 165.3 LBS

## 2023-08-31 DIAGNOSIS — B35.1 ONYCHOMYCOSIS OF MULTIPLE TOENAILS WITH TYPE 2 DIABETES MELLITUS (HCC): ICD-10-CM

## 2023-08-31 DIAGNOSIS — Z12.11 SCREEN FOR COLON CANCER: ICD-10-CM

## 2023-08-31 DIAGNOSIS — E11.69 HYPERLIPIDEMIA ASSOCIATED WITH TYPE 2 DIABETES MELLITUS (HCC): ICD-10-CM

## 2023-08-31 DIAGNOSIS — E11.59 HYPERTENSION COMPLICATING DIABETES (HCC): ICD-10-CM

## 2023-08-31 DIAGNOSIS — I73.9 CLAUDICATION OF CALF MUSCLES (HCC): ICD-10-CM

## 2023-08-31 DIAGNOSIS — E11.69 ONYCHOMYCOSIS OF MULTIPLE TOENAILS WITH TYPE 2 DIABETES MELLITUS (HCC): ICD-10-CM

## 2023-08-31 DIAGNOSIS — I25.119 CORONARY ARTERY DISEASE INVOLVING NATIVE CORONARY ARTERY OF NATIVE HEART WITH ANGINA PECTORIS (HCC): ICD-10-CM

## 2023-08-31 DIAGNOSIS — E78.5 HYPERLIPIDEMIA ASSOCIATED WITH TYPE 2 DIABETES MELLITUS (HCC): ICD-10-CM

## 2023-08-31 DIAGNOSIS — Z00.00 MEDICARE ANNUAL WELLNESS VISIT, SUBSEQUENT: ICD-10-CM

## 2023-08-31 DIAGNOSIS — I15.2 HYPERTENSION COMPLICATING DIABETES (HCC): ICD-10-CM

## 2023-08-31 DIAGNOSIS — Z12.5 SCREENING FOR PROSTATE CANCER: ICD-10-CM

## 2023-08-31 DIAGNOSIS — F17.210 CIGARETTE NICOTINE DEPENDENCE WITHOUT COMPLICATION: ICD-10-CM

## 2023-08-31 DIAGNOSIS — E11.9 DIABETES MELLITUS TYPE 2 IN NONOBESE (HCC): Primary | ICD-10-CM

## 2023-08-31 LAB — SL AMB POCT HEMOGLOBIN AIC: 5.8 (ref ?–6.5)

## 2023-08-31 PROCEDURE — 99406 BEHAV CHNG SMOKING 3-10 MIN: CPT | Performed by: PHYSICIAN ASSISTANT

## 2023-08-31 PROCEDURE — G0438 PPPS, INITIAL VISIT: HCPCS | Performed by: PHYSICIAN ASSISTANT

## 2023-08-31 PROCEDURE — 99214 OFFICE O/P EST MOD 30 MIN: CPT | Performed by: PHYSICIAN ASSISTANT

## 2023-08-31 PROCEDURE — 83036 HEMOGLOBIN GLYCOSYLATED A1C: CPT | Performed by: PHYSICIAN ASSISTANT

## 2023-08-31 RX ORDER — NICOTINE 21 MG/24HR
1 PATCH, TRANSDERMAL 24 HOURS TRANSDERMAL EVERY 24 HOURS
Qty: 28 PATCH | Refills: 0 | Status: SHIPPED | OUTPATIENT
Start: 2023-08-31

## 2023-08-31 RX ORDER — NICOTINE 21 MG/24HR
1 PATCH, TRANSDERMAL 24 HOURS TRANSDERMAL EVERY 24 HOURS
Qty: 42 PATCH | Refills: 0 | Status: SHIPPED | OUTPATIENT
Start: 2023-08-31 | End: 2023-10-12

## 2023-08-31 NOTE — PROGRESS NOTES
Assessment and Plan:     Problem List Items Addressed This Visit        Endocrine    Hypertension complicating diabetes ([address])    Relevant Orders    CBC and differential    Diabetes mellitus type 2 in nonobese ([address]) - Primary    Relevant Orders    POCT hemoglobin A1c (Completed)    Comprehensive metabolic panel    Hyperlipidemia associated with type 2 diabetes mellitus ([address])    Relevant Orders    Lipid Panel with Direct LDL reflex       Cardiovascular and Mediastinum    Coronary artery disease involving native coronary artery of native heart with angina pectoris ([address])    Relevant Orders    Lipid Panel with Direct LDL reflex       Other    Claudication of calf muscles (HCC)    Relevant Orders    VAS lower limb arterial duplex, complete bilateral    Cigarette nicotine dependence without complication    Relevant Medications    nicotine (NICODERM CQ) 21 mg/24 hr TD 24 hr patch    nicotine (NICODERM CQ) 14 mg/24hr TD 24 hr patch   Other Visit Diagnoses     Screen for colon cancer        Relevant Orders    Ambulatory Referral to Gastroenterology    Screening for prostate cancer        Relevant Orders    PSA, Total Screen    Medicare annual wellness visit, subsequent        Onychomycosis of multiple toenails with type 2 diabetes mellitus ([address])        Relevant Orders    Ambulatory Referral to Podiatry      DM well controlled, continue current regimen. BP at goal, continue same regimen. Continue statin, check FLP. Labs as above. Sx consistent with claudication, ordered arterial studies, discussed importance of completing. Increase walking, continue ASA statin. Discussed the absolute importance to quit smoking, pt agreeable to NRT patches. Tightly control DM, HLD, BP. Will follow with results. 3 month follow up, earlier prn     Preventive health issues were discussed with patient, and age appropriate screening tests were ordered as noted in patient's After Visit Summary.   Personalized health advice and appropriate referrals for health education or preventive services given if needed, as noted in patient's After Visit Summary. History of Present Illness:     Patient presents for a Medicare Wellness Visit    Pt presents for routine follow up     DM: a1c 5.8 from 5.9. on metformin and glimepiride. +ace/statin  HLD: on statin, due for FLP  HTN: well controlled on coreg, amlodipine, chlorthalidone, lisinopril. 123/82. Coreg recently increased  CAD: s/p TRAVIS x 1 in 11/2021. Follows with cardiology. On ASA, statin, plavix. No CP, SOB, HAYES, syncope     Does not pain in both legs when walking, mostly the calf region, L > R. Did not complete previously ordered arterial studies. Continues to smoke. Notes sx are worsening. Patient Care Team:  Fady Carrillo PA-C as PCP - General (Family Medicine)  Verito Najera MD as PCP - PCP-[facility])     Review of Systems:     Review of Systems   Constitutional: Negative for chills, fatigue and fever. HENT: Negative for congestion, ear pain, hearing loss, nosebleeds, postnasal drip, rhinorrhea, sinus pressure, sinus pain, sneezing and sore throat. Eyes: Negative for pain, discharge, itching and visual disturbance. Respiratory: Negative for cough, chest tightness, shortness of breath and wheezing. Cardiovascular: Negative for chest pain, palpitations and leg swelling. Gastrointestinal: Negative for abdominal pain, blood in stool, constipation, diarrhea, nausea and vomiting. Genitourinary: Negative for frequency and urgency. Musculoskeletal: Positive for myalgias. Neurological: Negative for dizziness, light-headedness and numbness.         Problem List:     Patient Active Problem List   Diagnosis   • Hypertension complicating diabetes ([address])   • Hyperosmolar hyponatremia   • Dyslipidemia   • Abnormal transaminases   • Leukocytosis   • Chest pain   • Diabetes mellitus type 2 in nonobese [facility])   • Tobacco abuse   • Cardiomyopathy [facility])   • Coronary artery disease involving native coronary artery of native heart with angina pectoris (HCC)   • Generalized anxiety disorder   • Arthritis of right knee   • Claudication of calf muscles (HCC)   • Hypertensive heart disease without heart failure   • Hyperlipidemia associated with type 2 diabetes mellitus ([address])   • Cigarette nicotine dependence without complication   • Lightheadedness   • Numbness      Past Medical and Surgical History:     Past Medical History:   Diagnosis Date   • Anxiety    • Arthritis    • Diabetes mellitus ([address])    • Hyperlipidemia    • Hyperosmolar non-ketotic state in patient with type 2 diabetes mellitus ([address]) 12/05/2019   • Hypertension    • Type 2 diabetes mellitus with hyperglycemia, with long-term current use of insulin ([address]) 05/08/2020     Past Surgical History:   Procedure Laterality Date   • CARDIAC CATHETERIZATION N/A 11/11/2021    Procedure: Cardiac catheterization;  Surgeon: Josh Timmons MD;  Location: [address] [place] [address] Av CATH LAB; Service: Cardiology   • CARDIAC CATHETERIZATION Left 11/11/2021    Procedure: Cardiac Coronary Angiogram;  Surgeon: Josh Timmons MD;  Location: [address] [place] [address] [place] CATH LAB;   Service: Cardiology   • COLONOSCOPY  2010   • KNEE SURGERY Right       Family History:     Family History   Problem Relation Age of Onset   • Diabetes Mother    • Hyperlipidemia Mother    • Dementia Mother    • Hyperlipidemia Father       Social History:     Social History     Socioeconomic History   • Marital status: /Civil Union     Spouse name: None   • Number of children: None   • Years of education: None   • Highest education level: None   Occupational History   • None   Tobacco Use   • Smoking status: Light Smoker     Packs/day: 0.50     Years: 40.00     Total pack years: 20.00     Types: Cigarettes   • Smokeless tobacco: Never   Vaping Use   • Vaping Use: Never used   Substance and Sexual Activity   • Alcohol use: Yes     Comment: occasionally   • Drug use: Never   • Sexual activity: Not Currently Partners: Female     Birth control/protection: None   Other Topics Concern   • None   Social History Narrative   • None     Social Determinants of Health     Financial Resource Strain: Low Risk  (8/31/2023)    Overall Financial Resource Strain (CARDIA)    • Difficulty of Paying Living Expenses: Not hard at all   Food Insecurity: No Food Insecurity (2/27/2023)    Hunger Vital Sign    • Worried About Running Out of Food in the Last Year: Never true    • Ran Out of Food in the Last Year: Never true   Transportation Needs: No Transportation Needs (8/31/2023)    PRAPARE - Transportation    • Lack of Transportation (Medical): No    • Lack of Transportation (Non-Medical):  No   Physical Activity: Not on file   Stress: Not on file   Social Connections: Not on file   Intimate Partner Violence: Not on file   Housing Stability: Low Risk  (2/27/2023)    Housing Stability Vital Sign    • Unable to Pay for Housing in the Last Year: No    • Number of Places Lived in the Last Year: 1    • Unstable Housing in the Last Year: No      Medications and Allergies:     Current Outpatient Medications   Medication Sig Dispense Refill   • nicotine (NICODERM CQ) 14 mg/24hr TD 24 hr patch Place 1 patch on the skin over 24 hours every 24 hours After completion of 21mg patches 28 patch 0   • nicotine (NICODERM CQ) 21 mg/24 hr TD 24 hr patch Place 1 patch on the skin over 24 hours every 24 hours 42 patch 0   • amLODIPine (NORVASC) 10 mg tablet TAKE ONE TABLET BY MOUTH EVERY DAY 90 tablet 1   • aspirin 81 mg chewable tablet Chew 1 tablet (81 mg total) daily 90 tablet 1   • atorvastatin (LIPITOR) 40 mg tablet TAKE ONE TABLET BY MOUTH EVERY DAY WITH DINNER 90 tablet 0   • busPIRone (BUSPAR) 10 mg tablet TAKE ONE TABLET BY MOUTH TWICE A DAY 60 tablet 5   • carvedilol (COREG) 12.5 mg tablet Take 2 tablets (25 mg total) by mouth 2 (two) times a day with meals 180 tablet 4   • chlorthalidone 25 mg tablet Take 1 tablet (25 mg total) by mouth daily 90 tablet 1   • glimepiride (AMARYL) 2 mg tablet TAKE ONE TABLET BY MOUTH EVERY DAY WITH BREAKFAST. 90 tablet 1   • lisinopril (ZESTRIL) 40 mg tablet TAKE ONE TABLET BY MOUTH EVERY DAY 30 tablet 0   • metFORMIN (GLUCOPHAGE) 500 mg tablet TAKE ONE TABLET BY MOUTH TWICE A DAY WITH MEALS 90 tablet 2   • Multiple Vitamins-Minerals (MULTIVITAMIN WITH MINERALS) tablet Take 1 tablet by mouth daily       No current facility-administered medications for this visit. No Known Allergies   Immunizations:     Immunization History   Administered Date(s) Administered   • COVID-19 MODERNA VACC 0.5 ML IM 04/16/2021, 05/14/2021, 11/15/2021   • COVID-19 Moderna Vac BIVALENT 12 Yr+ IM (BOOSTER ONLY) 0.5 ML 10/09/2022   • Hep B, Adolescent or Pediatric 08/11/2008   • Hep B, adult 10/13/2008   • INFLUENZA 12/20/2022   • Influenza, recombinant, quadrivalent,injectable, preservative free 12/05/2019, 08/31/2020   • Pneumococcal Polysaccharide PPV23 12/08/2019   • Tdap 08/11/2008      Health Maintenance:         Topic Date Due   • Hepatitis C Screening  Never done   • Colorectal Cancer Screening  Never done   • Lung Cancer Screening  02/26/2024         Topic Date Due   • Pneumococcal Vaccine: 65+ Years (2 - PCV) 12/08/2020   • COVID-19 Vaccine (5 - Moderna series) 02/09/2023   • Influenza Vaccine (1) 09/01/2023      Medicare Screening Tests and Risk Assessments:     Fer Parsons is here for his Subsequent Wellness visit. Health Risk Assessment:   Patient rates overall health as good. Patient feels that their physical health rating is same. Patient is satisfied with their life. Eyesight was rated as same. Hearing was rated as same. Patient feels that their emotional and mental health rating is same. Patients states they are never, rarely angry. Patient states they are never, rarely unusually tired/fatigued. Pain experienced in the last 7 days has been some. Patient's pain rating has been 4/10.  Patient states that he has experienced no weight loss or gain in last 6 months. Depression Screening:   PHQ-2 Score: 2      Fall Risk Screening: In the past year, patient has experienced: no history of falling in past year      Home Safety:  Patient does not have trouble with stairs inside or outside of their home. Patient has working smoke alarms and has working carbon monoxide detector. Home safety hazards include: none. Nutrition:   Current diet is Regular. Medications:   Patient is currently taking over-the-counter supplements. OTC medications include: see medication list. Patient is able to manage medications. Activities of Daily Living (ADLs)/Instrumental Activities of Daily Living (IADLs):   Walk and transfer into and out of bed and chair?: Yes  Dress and groom yourself?: Yes    Bathe or shower yourself?: Yes    Feed yourself?  Yes  Do your laundry/housekeeping?: Yes  Manage your money, pay your bills and track your expenses?: Yes  Make your own meals?: Yes    Do your own shopping?: Yes    Previous Hospitalizations:   Any hospitalizations or ED visits within the last 12 months?: No      Advance Care Planning:   Living will: No    Durable POA for healthcare: No    Advanced directive: No    Advanced directive counseling given: Yes    Five wishes given: Yes      PREVENTIVE SCREENINGS      Cardiovascular Screening:    General: Screening Not Indicated and History Lipid Disorder      Diabetes Screening:     General: Screening Not Indicated and History Diabetes      Colorectal Cancer Screening:     General: Risks and Benefits Discussed    Due for: Colonoscopy - Low Risk      Prostate Cancer Screening:    General: Risks and Benefits Discussed    Due for: PSA      Osteoporosis Screening:    General: Risks and Benefits Discussed and Screening Not Indicated      Abdominal Aortic Aneurysm (AAA) Screening:    Risk factors include: age between 70-77 yo and tobacco use        General: Screening Not Indicated      Lung Cancer Screening:     General: Screening Current      Hepatitis C Screening:    General: Risks and Benefits Discussed and Screening Not Indicated    Screening, Brief Intervention, and Referral to Treatment (SBIRT)    Screening  Typical number of drinks in a day: 2  Typical number of drinks in a week: 10  Interpretation: Low risk drinking behavior. Single Item Drug Screening:  How often have you used an illegal drug (including marijuana) or a prescription medication for non-medical reasons in the past year? never    Single Item Drug Screen Score: 0  Interpretation: Negative screen for possible drug use disorder    No results found. Physical Exam:     /82   Pulse 67   Temp (!) 97.3 °F (36.3 °C)   Ht 5' 7" (1.702 m)   Wt 75 kg (165 lb 4.8 oz)   SpO2 100%   BMI 25.89 kg/m²     Physical Exam  Vitals and nursing note reviewed. Constitutional:       General: He is not in acute distress. Appearance: He is well-developed. HENT:      Head: Normocephalic and atraumatic. Eyes:      Conjunctiva/sclera: Conjunctivae normal.   Cardiovascular:      Rate and Rhythm: Normal rate and regular rhythm. Pulses: no weak pulses          Dorsalis pedis pulses are 2+ on the right side and 2+ on the left side. Posterior tibial pulses are 1+ on the right side and 1+ on the left side. Heart sounds: Murmur heard. Pulmonary:      Effort: Pulmonary effort is normal. No respiratory distress. Breath sounds: Normal breath sounds. No wheezing, rhonchi or rales. Abdominal:      Palpations: Abdomen is soft. Tenderness: There is no abdominal tenderness. Musculoskeletal:         General: No swelling. Cervical back: Neck supple. Right lower leg: No edema. Left lower leg: No edema. Feet:      Right foot:      Skin integrity: No ulcer, skin breakdown, erythema, warmth, callus or dry skin. Left foot:      Skin integrity: No ulcer, skin breakdown, erythema, warmth, callus or dry skin.    Skin:     General: Skin is warm and dry. Capillary Refill: Capillary refill takes less than 2 seconds. Neurological:      Mental Status: He is alert. Psychiatric:         Mood and Affect: Mood normal.        Diabetic Foot Exam    Patient's shoes and socks removed. Right Foot/Ankle   Right Foot Inspection  Skin Exam: skin normal and skin intact. No dry skin, no warmth, no callus, no erythema, no maceration, no abnormal color, no pre-ulcer, no ulcer and no callus. Toe Exam: ROM and strength within normal limits. Sensory   Vibration: intact  Proprioception: intact  Monofilament testing: intact    Vascular  Capillary refills: < 3 seconds  The right DP pulse is 2+. The right PT pulse is 1+. Left Foot/Ankle  Left Foot Inspection  Skin Exam: skin normal and skin intact. No dry skin, no warmth, no erythema, no maceration, normal color, no pre-ulcer, no ulcer and no callus. Toe Exam: ROM and strength within normal limits. Sensory   Vibration: intact  Proprioception: intact  Monofilament testing: intact    Vascular  Capillary refills: < 3 seconds  The left DP pulse is 2+. The left PT pulse is 1+.      Assign Risk Category  No deformity present  No loss of protective sensation  No weak pulses  Risk: 2 (onychomycosis of multiple toenails)      Nash Guzman PA-C

## 2023-08-31 NOTE — PATIENT INSTRUCTIONS
Medicare Preventive Visit Patient Instructions  Thank you for completing your Welcome to Medicare Visit or Medicare Annual Wellness Visit today. Your next wellness visit will be due in one year (8/31/2024). The screening/preventive services that you may require over the next 5-10 years are detailed below. Some tests may not apply to you based off risk factors and/or age. Screening tests ordered at today's visit but not completed yet may show as past due. Also, please note that scanned in results may not display below. Preventive Screenings:  Service Recommendations Previous Testing/Comments   Colorectal Cancer Screening  · Colonoscopy    · Fecal Occult Blood Test (FOBT)/Fecal Immunochemical Test (FIT)  · Fecal DNA/Cologuard Test  · Flexible Sigmoidoscopy Age: 43-73 years old   Colonoscopy: every 10 years (May be performed more frequently if at higher risk)  OR  FOBT/FIT: every 1 year  OR  Cologuard: every 3 years  OR  Sigmoidoscopy: every 5 years  Screening may be recommended earlier than age 39 if at higher risk for colorectal cancer. Also, an individualized decision between you and your healthcare provider will decide whether screening between the ages of 77-80 would be appropriate.  Colonoscopy: Not on file  FOBT/FIT: Not on file  Cologuard: Not on file  Sigmoidoscopy: Not on file          Prostate Cancer Screening Individualized decision between patient and health care provider in men between ages of 53-66   Medicare will cover every 12 months beginning on the day after your 50th birthday PSA: 1.4 ng/mL           Hepatitis C Screening Once for adults born between 1945 and 1965  More frequently in patients at high risk for Hepatitis C Hep C Antibody: Not on file        Diabetes Screening 1-2 times per year if you're at risk for diabetes or have pre-diabetes Fasting glucose: 126 mg/dL (4/4/2023)  A1C: 5.9 % (4/4/2023)  Screening Not Indicated  History Diabetes   Cholesterol Screening Once every 5 years if you don't have a lipid disorder. May order more often based on risk factors. Lipid panel: 04/04/2023  Screening Not Indicated  History Lipid Disorder      Other Preventive Screenings Covered by Medicare:  1. Abdominal Aortic Aneurysm (AAA) Screening: covered once if your at risk. You're considered to be at risk if you have a family history of AAA or a male between the age of 70-76 who smoking at least 100 cigarettes in your lifetime. 2. Lung Cancer Screening: covers low dose CT scan once per year if you meet all of the following conditions: (1) Age 48-67; (2) No signs or symptoms of lung cancer; (3) Current smoker or have quit smoking within the last 15 years; (4) You have a tobacco smoking history of at least 20 pack years (packs per day x number of years you smoked); (5) You get a written order from a healthcare provider. 3. Glaucoma Screening: covered annually if you're considered high risk: (1) You have diabetes OR (2) Family history of glaucoma OR (3)  aged 48 and older OR (3)  American aged 72 and older  3. Osteoporosis Screening: covered every 2 years if you meet one of the following conditions: (1) Have a vertebral abnormality; (2) On glucocorticoid therapy for more than 3 months; (3) Have primary hyperparathyroidism; (4) On osteoporosis medications and need to assess response to drug therapy. 5. HIV Screening: covered annually if you're between the age of 14-79. Also covered annually if you are younger than 13 and older than 72 with risk factors for HIV infection. For pregnant patients, it is covered up to 3 times per pregnancy.     Immunizations:  Immunization Recommendations   Influenza Vaccine Annual influenza vaccination during flu season is recommended for all persons aged >= 6 months who do not have contraindications   Pneumococcal Vaccine   * Pneumococcal conjugate vaccine = PCV13 (Prevnar 13), PCV15 (Vaxneuvance), PCV20 (Prevnar 20)  * Pneumococcal polysaccharide vaccine = PPSV23 (Pneumovax) Adults 2364 years old: 1-3 doses may be recommended based on certain risk factors  Adults 72 years old: 1-2 doses may be recommended based off what pneumonia vaccine you previously received   Hepatitis B Vaccine 3 dose series if at intermediate or high risk (ex: diabetes, end stage renal disease, liver disease)   Tetanus (Td) Vaccine - COST NOT COVERED BY MEDICARE PART B Following completion of primary series, a booster dose should be given every 10 years to maintain immunity against tetanus. Td may also be given as tetanus wound prophylaxis. Tdap Vaccine - COST NOT COVERED BY MEDICARE PART B Recommended at least once for all adults. For pregnant patients, recommended with each pregnancy. Shingles Vaccine (Shingrix) - COST NOT COVERED BY MEDICARE PART B  2 shot series recommended in those aged 48 and above     Health Maintenance Due:      Topic Date Due   • Hepatitis C Screening  Never done   • Colorectal Cancer Screening  Never done   • Lung Cancer Screening  02/26/2024     Immunizations Due:      Topic Date Due   • Pneumococcal Vaccine: 65+ Years (2 - PCV) 12/08/2020   • COVID-19 Vaccine (5 - Moderna series) 02/09/2023   • Influenza Vaccine (1) 09/01/2023     Advance Directives   What are advance directives? Advance directives are legal documents that state your wishes and plans for medical care. These plans are made ahead of time in case you lose your ability to make decisions for yourself. Advance directives can apply to any medical decision, such as the treatments you want, and if you want to donate organs. What are the types of advance directives? There are many types of advance directives, and each state has rules about how to use them. You may choose a combination of any of the following:  · Living will: This is a written record of the treatment you want. You can also choose which treatments you do not want, which to limit, and which to stop at a certain time.  This includes surgery, medicine, IV fluid, and tube feedings. · Durable power of  for healthcare [facility]): This is a written record that states who you want to make healthcare choices for you when you are unable to make them for yourself. This person, called a proxy, is usually a family member or a friend. You may choose more than 1 proxy. · Do not resuscitate (DNR) order:  A DNR order is used in case your heart stops beating or you stop breathing. It is a request not to have certain forms of treatment, such as CPR. A DNR order may be included in other types of advance directives. · Medical directive: This covers the care that you want if you are in a coma, near death, or unable to make decisions for yourself. You can list the treatments you want for each condition. Treatment may include pain medicine, surgery, blood transfusions, dialysis, IV or tube feedings, and a ventilator (breathing machine). · Values history: This document has questions about your views, beliefs, and how you feel and think about life. This information can help others choose the care that you would choose. Why are advance directives important? An advance directive helps you control your care. Although spoken wishes may be used, it is better to have your wishes written down. Spoken wishes can be misunderstood, or not followed. Treatments may be given even if you do not want them. An advance directive may make it easier for your family to make difficult choices about your care. Cigarette Smoking and Your Health   Risks to your health if you smoke:  Nicotine and other chemicals found in tobacco damage every cell in your body. Even if you are a light smoker, you have an increased risk for cancer, heart disease, and lung disease. If you are pregnant or have diabetes, smoking increases your risk for complications.    Benefits to your health if you stop smoking:   · You decrease respiratory symptoms such as coughing, wheezing, and shortness of breath. · You reduce your risk for cancers of the lung, mouth, throat, kidney, bladder, pancreas, stomach, and cervix. If you already have cancer, you increase the benefits of chemotherapy. You also reduce your risk for cancer returning or a second cancer from developing. · You reduce your risk for heart disease, blood clots, heart attack, and stroke. · You reduce your risk for lung infections, and diseases such as pneumonia, asthma, chronic bronchitis, and emphysema. · Your circulation improves. More oxygen can be delivered to your body. If you have diabetes, you lower your risk for complications, such as kidney, artery, and eye diseases. You also lower your risk for nerve damage. Nerve damage can lead to amputations, poor vision, and blindness. · You improve your body's ability to heal and to fight infections. For more information and support to stop smoking:   · [employer]  Phone: 6- 851 - 028-4080  Web Address: www.[employer]  Weight Management   Why it is important to manage your weight:  Being overweight increases your risk of health conditions such as heart disease, high blood pressure, type 2 diabetes, and certain types of cancer. It can also increase your risk for osteoarthritis, sleep apnea, and other respiratory problems. Aim for a slow, steady weight loss. Even a small amount of weight loss can lower your risk of health problems. How to lose weight safely:  A safe and healthy way to lose weight is to eat fewer calories and get regular exercise. You can lose up about 1 pound a week by decreasing the number of calories you eat by 500 calories each day. Healthy meal plan for weight management:  A healthy meal plan includes a variety of foods, contains fewer calories, and helps you stay healthy. A healthy meal plan includes the following:  · Eat whole-grain foods more often. A healthy meal plan should contain fiber.  Fiber is the part of grains, fruits, and vegetables that is not broken down by your body. Whole-grain foods are healthy and provide extra fiber in your diet. Some examples of whole-grain foods are whole-wheat breads and pastas, oatmeal, brown rice, and bulgur. · Eat a variety of vegetables every day. Include dark, leafy greens such as spinach, kale, randee greens, and mustard greens. Eat yellow and orange vegetables such as carrots, sweet potatoes, and winter squash. · Eat a variety of fruits every day. Choose fresh or canned fruit (canned in its own juice or light syrup) instead of juice. Fruit juice has very little or no fiber. · Eat low-fat dairy foods. Drink fat-free (skim) milk or 1% milk. Eat fat-free yogurt and low-fat cottage cheese. Try low-fat cheeses such as mozzarella and other reduced-fat cheeses. · Choose meat and other protein foods that are low in fat. Choose beans or other legumes such as split peas or lentils. Choose fish, skinless poultry (chicken or turkey), or lean cuts of red meat (beef or pork). Before you cook meat or poultry, cut off any visible fat. · Use less fat and oil. Try baking foods instead of frying them. Add less fat, such as margarine, sour cream, regular salad dressing and mayonnaise to foods. Eat fewer high-fat foods. Some examples of high-fat foods include french fries, doughnuts, ice cream, and cakes. · Eat fewer sweets. Limit foods and drinks that are high in sugar. This includes candy, cookies, regular soda, and sweetened drinks. Exercise:  Exercise at least 30 minutes per day on most days of the week. Some examples of exercise include walking, biking, dancing, and swimming. You can also fit in more physical activity by taking the stairs instead of the elevator or parking farther away from stores. Ask your healthcare provider about the best exercise plan for you. © Copyright [employer] 2018 Information is for End User's use only and may not be sold, redistributed or otherwise used for commercial purposes.  All illustrations and images included in CareNotes® are the copyrighted property of [employer] or [address] [place] [address]

## 2023-09-16 DIAGNOSIS — E78.5 DYSLIPIDEMIA: ICD-10-CM

## 2023-09-16 DIAGNOSIS — I10 ESSENTIAL HYPERTENSION: ICD-10-CM

## 2023-09-18 RX ORDER — LISINOPRIL 40 MG/1
TABLET ORAL
Qty: 30 TABLET | Refills: 0 | Status: SHIPPED | OUTPATIENT
Start: 2023-09-18

## 2023-09-18 RX ORDER — ATORVASTATIN CALCIUM 40 MG/1
TABLET, FILM COATED ORAL
Qty: 90 TABLET | Refills: 0 | Status: SHIPPED | OUTPATIENT
Start: 2023-09-18

## 2023-09-26 ENCOUNTER — APPOINTMENT (OUTPATIENT)
Dept: LAB | Facility: CLINIC | Age: 68
End: 2023-09-26
Payer: MEDICARE

## 2023-09-26 DIAGNOSIS — Z12.5 SCREENING FOR PROSTATE CANCER: ICD-10-CM

## 2023-09-26 DIAGNOSIS — E11.69 HYPERLIPIDEMIA ASSOCIATED WITH TYPE 2 DIABETES MELLITUS: ICD-10-CM

## 2023-09-26 DIAGNOSIS — E11.9 DIABETES MELLITUS TYPE 2 IN NONOBESE (HCC): ICD-10-CM

## 2023-09-26 DIAGNOSIS — I25.119 CORONARY ARTERY DISEASE INVOLVING NATIVE CORONARY ARTERY OF NATIVE HEART WITH ANGINA PECTORIS (HCC): ICD-10-CM

## 2023-09-26 DIAGNOSIS — E78.5 HYPERLIPIDEMIA ASSOCIATED WITH TYPE 2 DIABETES MELLITUS: ICD-10-CM

## 2023-09-26 DIAGNOSIS — I15.2 HYPERTENSION COMPLICATING DIABETES: ICD-10-CM

## 2023-09-26 DIAGNOSIS — E11.59 HYPERTENSION COMPLICATING DIABETES: ICD-10-CM

## 2023-09-26 LAB
ALBUMIN SERPL BCP-MCNC: 4.4 G/DL (ref 3.5–5)
ALP SERPL-CCNC: 37 U/L (ref 34–104)
ALT SERPL W P-5'-P-CCNC: 25 U/L (ref 7–52)
ANION GAP SERPL CALCULATED.3IONS-SCNC: 9 MMOL/L
AST SERPL W P-5'-P-CCNC: 21 U/L (ref 13–39)
BASOPHILS # BLD AUTO: 0.02 THOUSANDS/ÂΜL (ref 0–0.1)
BASOPHILS NFR BLD AUTO: 0 % (ref 0–1)
BILIRUB SERPL-MCNC: 0.39 MG/DL (ref 0.2–1)
BUN SERPL-MCNC: 18 MG/DL (ref 5–25)
CALCIUM SERPL-MCNC: 9.8 MG/DL (ref 8.4–10.2)
CHLORIDE SERPL-SCNC: 99 MMOL/L (ref 96–108)
CHOLEST SERPL-MCNC: 125 MG/DL
CO2 SERPL-SCNC: 32 MMOL/L (ref 21–32)
CREAT SERPL-MCNC: 1.03 MG/DL (ref 0.6–1.3)
EOSINOPHIL # BLD AUTO: 0.23 THOUSAND/ÂΜL (ref 0–0.61)
EOSINOPHIL NFR BLD AUTO: 3 % (ref 0–6)
ERYTHROCYTE [DISTWIDTH] IN BLOOD BY AUTOMATED COUNT: 12.6 % (ref 11.6–15.1)
GFR SERPL CREATININE-BSD FRML MDRD: 74 ML/MIN/1.73SQ M
GLUCOSE P FAST SERPL-MCNC: 139 MG/DL (ref 65–99)
HCT VFR BLD AUTO: 43.1 % (ref 36.5–49.3)
HDLC SERPL-MCNC: 31 MG/DL
HGB BLD-MCNC: 14.1 G/DL (ref 12–17)
IMM GRANULOCYTES # BLD AUTO: 0.03 THOUSAND/UL (ref 0–0.2)
IMM GRANULOCYTES NFR BLD AUTO: 0 % (ref 0–2)
LDLC SERPL CALC-MCNC: 52 MG/DL (ref 0–100)
LYMPHOCYTES # BLD AUTO: 2.28 THOUSANDS/ÂΜL (ref 0.6–4.47)
LYMPHOCYTES NFR BLD AUTO: 32 % (ref 14–44)
MCH RBC QN AUTO: 31.5 PG (ref 26.8–34.3)
MCHC RBC AUTO-ENTMCNC: 32.7 G/DL (ref 31.4–37.4)
MCV RBC AUTO: 96 FL (ref 82–98)
MONOCYTES # BLD AUTO: 0.87 THOUSAND/ÂΜL (ref 0.17–1.22)
MONOCYTES NFR BLD AUTO: 12 % (ref 4–12)
NEUTROPHILS # BLD AUTO: 3.66 THOUSANDS/ÂΜL (ref 1.85–7.62)
NEUTS SEG NFR BLD AUTO: 53 % (ref 43–75)
NRBC BLD AUTO-RTO: 0 /100 WBCS
PLATELET # BLD AUTO: 153 THOUSANDS/UL (ref 149–390)
PMV BLD AUTO: 12.3 FL (ref 8.9–12.7)
POTASSIUM SERPL-SCNC: 3.7 MMOL/L (ref 3.5–5.3)
PROT SERPL-MCNC: 7.2 G/DL (ref 6.4–8.4)
PSA SERPL-MCNC: 2.34 NG/ML (ref 0–4)
RBC # BLD AUTO: 4.48 MILLION/UL (ref 3.88–5.62)
SODIUM SERPL-SCNC: 140 MMOL/L (ref 135–147)
TRIGL SERPL-MCNC: 211 MG/DL
WBC # BLD AUTO: 7.09 THOUSAND/UL (ref 4.31–10.16)

## 2023-09-26 PROCEDURE — G0103 PSA SCREENING: HCPCS

## 2023-09-26 PROCEDURE — 80053 COMPREHEN METABOLIC PANEL: CPT

## 2023-09-26 PROCEDURE — 36415 COLL VENOUS BLD VENIPUNCTURE: CPT

## 2023-09-26 PROCEDURE — 85025 COMPLETE CBC W/AUTO DIFF WBC: CPT

## 2023-09-26 PROCEDURE — 80061 LIPID PANEL: CPT

## 2023-10-10 DIAGNOSIS — I10 ESSENTIAL HYPERTENSION: ICD-10-CM

## 2023-10-10 RX ORDER — LISINOPRIL 40 MG/1
TABLET ORAL
Qty: 30 TABLET | Refills: 0 | Status: SHIPPED | OUTPATIENT
Start: 2023-10-10

## 2023-10-18 ENCOUNTER — PREP FOR PROCEDURE (OUTPATIENT)
Age: 68
End: 2023-10-18

## 2023-10-18 ENCOUNTER — TELEPHONE (OUTPATIENT)
Age: 68
End: 2023-10-18

## 2023-10-18 DIAGNOSIS — Z12.11 SCREENING FOR COLON CANCER: Primary | ICD-10-CM

## 2023-10-18 NOTE — TELEPHONE ENCOUNTER
Scheduled date of colonoscopy (as of today): 11/28/23    Physician performing colonoscopy: [facility]    Location of colonoscopy: [facility]    Bowel prep reviewed with patient: JOHN SOTOMAYOR    Instructions reviewed with patient by: JACKIE    Clearances:  N/A

## 2023-11-15 DIAGNOSIS — I10 ESSENTIAL HYPERTENSION: ICD-10-CM

## 2023-11-15 RX ORDER — LISINOPRIL 40 MG/1
TABLET ORAL
Qty: 30 TABLET | Refills: 0 | Status: SHIPPED | OUTPATIENT
Start: 2023-11-15

## 2023-11-27 ENCOUNTER — TELEPHONE (OUTPATIENT)
Dept: GASTROENTEROLOGY | Facility: CLINIC | Age: 68
End: 2023-11-27

## 2023-11-27 DIAGNOSIS — E11.9 DIABETES MELLITUS TYPE 2 IN NONOBESE (HCC): ICD-10-CM

## 2023-11-27 NOTE — TELEPHONE ENCOUNTER
Pt called to r/s procedure for tomorrow. Procedure r/s with Dr. Shahrzad Mai to 1/15/24 at [facility]. Instructions sent to [place].

## 2023-12-17 DIAGNOSIS — I10 ESSENTIAL HYPERTENSION: ICD-10-CM

## 2023-12-17 DIAGNOSIS — F41.1 GENERALIZED ANXIETY DISORDER: ICD-10-CM

## 2023-12-18 RX ORDER — LISINOPRIL 40 MG/1
TABLET ORAL
Qty: 30 TABLET | Refills: 0 | Status: SHIPPED | OUTPATIENT
Start: 2023-12-18

## 2023-12-18 RX ORDER — BUSPIRONE HYDROCHLORIDE 10 MG/1
TABLET ORAL
Qty: 60 TABLET | Refills: 5 | Status: SHIPPED | OUTPATIENT
Start: 2023-12-18

## 2024-01-03 DIAGNOSIS — I10 ESSENTIAL HYPERTENSION: ICD-10-CM

## 2024-01-03 DIAGNOSIS — E11.00 HYPEROSMOLAR NON-KETOTIC STATE IN PATIENT WITH TYPE 2 DIABETES MELLITUS (HCC): ICD-10-CM

## 2024-01-03 RX ORDER — GLIMEPIRIDE 2 MG/1
2 TABLET ORAL
Qty: 90 TABLET | Refills: 1 | Status: SHIPPED | OUTPATIENT
Start: 2024-01-03

## 2024-01-03 RX ORDER — AMLODIPINE BESYLATE 10 MG/1
TABLET ORAL
Qty: 90 TABLET | Refills: 1 | Status: SHIPPED | OUTPATIENT
Start: 2024-01-03

## 2024-01-03 RX ORDER — LISINOPRIL 40 MG/1
TABLET ORAL
Qty: 30 TABLET | Refills: 0 | Status: SHIPPED | OUTPATIENT
Start: 2024-01-03

## 2024-01-15 ENCOUNTER — ANESTHESIA (OUTPATIENT)
Dept: GASTROENTEROLOGY | Facility: HOSPITAL | Age: 69
End: 2024-01-15

## 2024-01-15 ENCOUNTER — ANESTHESIA EVENT (OUTPATIENT)
Dept: GASTROENTEROLOGY | Facility: HOSPITAL | Age: 69
End: 2024-01-15

## 2024-01-15 ENCOUNTER — HOSPITAL ENCOUNTER (OUTPATIENT)
Dept: GASTROENTEROLOGY | Facility: HOSPITAL | Age: 69
Setting detail: OUTPATIENT SURGERY
Discharge: HOME | End: 2024-01-15
Attending: INTERNAL MEDICINE
Payer: MEDICARE

## 2024-01-15 VITALS
SYSTOLIC BLOOD PRESSURE: 116 MMHG | DIASTOLIC BLOOD PRESSURE: 66 MMHG | TEMPERATURE: 97.2 F | BODY MASS INDEX: 26.02 KG/M2 | OXYGEN SATURATION: 97 % | HEART RATE: 57 BPM | RESPIRATION RATE: 16 BRPM | WEIGHT: 165.79 LBS | HEIGHT: 67 IN

## 2024-01-15 DIAGNOSIS — Z12.11 SCREENING FOR COLON CANCER: ICD-10-CM

## 2024-01-15 LAB — GLUCOSE SERPL-MCNC: 111 MG/DL (ref 65–140)

## 2024-01-15 PROCEDURE — 88305 TISSUE EXAM BY PATHOLOGIST: CPT | Performed by: PATHOLOGY

## 2024-01-15 PROCEDURE — 45385 COLONOSCOPY W/LESION REMOVAL: CPT | Performed by: INTERNAL MEDICINE

## 2024-01-15 PROCEDURE — 82948 REAGENT STRIP/BLOOD GLUCOSE: CPT

## 2024-01-15 RX ORDER — PROPOFOL 10 MG/ML
INJECTION, EMULSION INTRAVENOUS AS NEEDED
Status: DISCONTINUED | OUTPATIENT
Start: 2024-01-15 | End: 2024-01-15

## 2024-01-15 RX ORDER — SODIUM CHLORIDE, SODIUM LACTATE, POTASSIUM CHLORIDE, CALCIUM CHLORIDE 600; 310; 30; 20 MG/100ML; MG/100ML; MG/100ML; MG/100ML
INJECTION, SOLUTION INTRAVENOUS CONTINUOUS PRN
Status: DISCONTINUED | OUTPATIENT
Start: 2024-01-15 | End: 2024-01-15

## 2024-01-15 RX ORDER — SODIUM CHLORIDE, SODIUM LACTATE, POTASSIUM CHLORIDE, CALCIUM CHLORIDE 600; 310; 30; 20 MG/100ML; MG/100ML; MG/100ML; MG/100ML
75 INJECTION, SOLUTION INTRAVENOUS CONTINUOUS
Status: DISCONTINUED | OUTPATIENT
Start: 2024-01-15 | End: 2024-01-19 | Stop reason: HOSPADM

## 2024-01-15 RX ORDER — LIDOCAINE HYDROCHLORIDE 10 MG/ML
INJECTION, SOLUTION EPIDURAL; INFILTRATION; INTRACAUDAL; PERINEURAL AS NEEDED
Status: DISCONTINUED | OUTPATIENT
Start: 2024-01-15 | End: 2024-01-15

## 2024-01-15 RX ADMIN — PROPOFOL 70 MG: 10 INJECTION, EMULSION INTRAVENOUS at 07:19

## 2024-01-15 RX ADMIN — PROPOFOL 20 MG: 10 INJECTION, EMULSION INTRAVENOUS at 07:23

## 2024-01-15 RX ADMIN — PROPOFOL 20 MG: 10 INJECTION, EMULSION INTRAVENOUS at 07:28

## 2024-01-15 RX ADMIN — PROPOFOL 30 MG: 10 INJECTION, EMULSION INTRAVENOUS at 07:25

## 2024-01-15 RX ADMIN — PROPOFOL 30 MG: 10 INJECTION, EMULSION INTRAVENOUS at 07:21

## 2024-01-15 RX ADMIN — LIDOCAINE HYDROCHLORIDE 50 MG: 10 INJECTION, SOLUTION EPIDURAL; INFILTRATION; INTRACAUDAL at 07:19

## 2024-01-15 RX ADMIN — PROPOFOL 30 MG: 10 INJECTION, EMULSION INTRAVENOUS at 07:31

## 2024-01-15 RX ADMIN — SODIUM CHLORIDE, SODIUM LACTATE, POTASSIUM CHLORIDE, AND CALCIUM CHLORIDE: .6; .31; .03; .02 INJECTION, SOLUTION INTRAVENOUS at 07:16

## 2024-01-15 NOTE — H&P
"      History and Physical -  [facility]  Steve Douglass 68 y.o. male MRN: [id number]      HPI: Steve Douglass is a 68 y.o. year old male who presents for history of colon polyp      REVIEW OF SYSTEMS: Per the HPI, and otherwise unremarkable.    Historical Information   Past Medical History:   Diagnosis Date    Anxiety     Arthritis     Colon polyp     Diabetes mellitus (HCC)     Hyperlipidemia     Hyperosmolar non-ketotic state in patient with type 2 diabetes mellitus (HCC) 12/05/2019    Hypertension     Type 2 diabetes mellitus with hyperglycemia, with long-term current use of insulin (HCC) 05/08/2020     Past Surgical History:   Procedure Laterality Date    CARDIAC CATHETERIZATION N/A 11/11/2021    Procedure: Cardiac catheterization;  Surgeon: Mj Wakefield MD;  Location: [facility];  Service: Cardiology    CARDIAC CATHETERIZATION Left 11/11/2021    Procedure: Cardiac Coronary Angiogram;  Surgeon: Mj Wakefield MD;  Location: [facility];  Service: Cardiology    COLONOSCOPY  2010    KNEE SURGERY Right      Social History   Social History     Substance and Sexual Activity   Alcohol Use Yes    Comment: occasionally     Social History     Substance and Sexual Activity   Drug Use Yes    Types: Marijuana    Comment: daily     Social History     Tobacco Use   Smoking Status Light Smoker    Current packs/day: 0.50    Average packs/day: 0.5 packs/day for 40.0 years (20.0 ttl pk-yrs)    Types: Cigarettes   Smokeless Tobacco Never     Family History   Problem Relation Age of Onset    Diabetes Mother     Hyperlipidemia Mother     Dementia Mother     Hyperlipidemia Father        Meds/Allergies     (Not in a hospital admission)      No Known Allergies    Objective     Blood pressure 110/60, pulse 68, temperature (!) 97 °F (36.1 °C), temperature source Temporal, resp. rate 15, height 5' 7\" (1.702 m), weight 75.2 kg (165 lb 12.6 oz), SpO2 99%.      PHYSICAL EXAM    Gen: NAD  CV: " RRR  CHEST: Clear  ABD: soft, NT/ND  EXT: no edema      ASSESSMENT/PLAN:  This is a 68 y.o. year old male here for colonoscopy, and he is stable and optimized for his procedure.

## 2024-01-15 NOTE — ANESTHESIA PREPROCEDURE EVALUATION
Procedure:  COLONOSCOPY    Relevant Problems   CARDIO   (+) Chest pain   (+) Coronary artery disease involving native coronary artery of native heart with angina pectoris (HCC)   (+) Hyperlipidemia associated with type 2 diabetes mellitus    (+) Hypertension complicating diabetes       ENDO   (+) Diabetes mellitus type 2 in nonobese (HCC)      MUSCULOSKELETAL   (+) Arthritis of right knee      NEURO/PSYCH   (+) Claudication of calf muscles (HCC)   (+) Generalized anxiety disorder      Cardiovascular and Mediastinum   (+) Cardiomyopathy (HCC)   (+) Hypertensive heart disease without heart failure      Other   (+) Cigarette nicotine dependence without complication   (+) Tobacco abuse    >4 mets   Ongoing tobacco abuse     Physical Exam    Airway    Mallampati score: II  TM Distance: >3 FB  Neck ROM: full     Dental       Cardiovascular  Cardiovascular exam normal    Pulmonary  Pulmonary exam normal     Other Findings        Anesthesia Plan  ASA Score- 3     Anesthesia Type- IV sedation with anesthesia with ASA Monitors.         Additional Monitors:     Airway Plan:            Plan Factors-Exercise tolerance (METS): >4 METS.    Chart reviewed. EKG reviewed. Imaging results reviewed. Existing labs reviewed. Patient summary reviewed.    Patient is a current smoker.  Patient instructed to abstain from smoking on day of procedure. Patient smoked on day of surgery.            Induction- intravenous.    Postoperative Plan-     Informed Consent- Anesthetic plan and risks discussed with patient.  I personally reviewed this patient with the CRNA. Discussed and agreed on the Anesthesia Plan with the CRNA..

## 2024-01-15 NOTE — ANESTHESIA POSTPROCEDURE EVALUATION
Post-Op Assessment Note    CV Status:  Stable  Pain Score: 0    Pain management: adequate       Mental Status:  Alert and awake   Hydration Status:  Euvolemic   PONV Controlled:  Controlled   Airway Patency:  Patent and adequate  Two or more mitigation strategies used for obstructive sleep apnea   Post Op Vitals Reviewed: Yes      Staff: CRNA               BP   90/54   Temp      Pulse 57   Resp 20   SpO2 98

## 2024-01-18 PROCEDURE — 88305 TISSUE EXAM BY PATHOLOGIST: CPT | Performed by: PATHOLOGY

## 2024-01-23 ENCOUNTER — OFFICE VISIT (OUTPATIENT)
Dept: FAMILY MEDICINE CLINIC | Facility: CLINIC | Age: 69
End: 2024-01-23
Payer: MEDICARE

## 2024-01-23 VITALS
DIASTOLIC BLOOD PRESSURE: 84 MMHG | HEART RATE: 64 BPM | SYSTOLIC BLOOD PRESSURE: 118 MMHG | TEMPERATURE: 98.9 F | OXYGEN SATURATION: 98 % | WEIGHT: 170.8 LBS | BODY MASS INDEX: 26.75 KG/M2

## 2024-01-23 DIAGNOSIS — F17.210 CIGARETTE NICOTINE DEPENDENCE WITHOUT COMPLICATION: ICD-10-CM

## 2024-01-23 DIAGNOSIS — I15.2 HYPERTENSION COMPLICATING DIABETES: ICD-10-CM

## 2024-01-23 DIAGNOSIS — E78.5 HYPERLIPIDEMIA ASSOCIATED WITH TYPE 2 DIABETES MELLITUS: ICD-10-CM

## 2024-01-23 DIAGNOSIS — I73.9 CLAUDICATION OF CALF MUSCLES (HCC): ICD-10-CM

## 2024-01-23 DIAGNOSIS — E11.69 HYPERLIPIDEMIA ASSOCIATED WITH TYPE 2 DIABETES MELLITUS: ICD-10-CM

## 2024-01-23 DIAGNOSIS — I42.9 CARDIOMYOPATHY, UNSPECIFIED TYPE (HCC): ICD-10-CM

## 2024-01-23 DIAGNOSIS — I11.9 HYPERTENSIVE HEART DISEASE WITHOUT HEART FAILURE: ICD-10-CM

## 2024-01-23 DIAGNOSIS — E11.59 HYPERTENSION COMPLICATING DIABETES: ICD-10-CM

## 2024-01-23 DIAGNOSIS — I25.119 CORONARY ARTERY DISEASE INVOLVING NATIVE CORONARY ARTERY OF NATIVE HEART WITH ANGINA PECTORIS (HCC): ICD-10-CM

## 2024-01-23 DIAGNOSIS — E11.9 DIABETES MELLITUS TYPE 2 IN NONOBESE (HCC): Primary | ICD-10-CM

## 2024-01-23 LAB — SL AMB POCT HEMOGLOBIN AIC: 6.4 (ref ?–6.5)

## 2024-01-23 PROCEDURE — 99214 OFFICE O/P EST MOD 30 MIN: CPT | Performed by: PHYSICIAN ASSISTANT

## 2024-01-23 PROCEDURE — 83036 HEMOGLOBIN GLYCOSYLATED A1C: CPT | Performed by: PHYSICIAN ASSISTANT

## 2024-01-23 RX ORDER — BUPROPION HYDROCHLORIDE 150 MG/1
150 TABLET, EXTENDED RELEASE ORAL 2 TIMES DAILY
Qty: 180 TABLET | Refills: 0 | Status: SHIPPED | OUTPATIENT
Start: 2024-01-23 | End: 2024-04-22

## 2024-01-30 DIAGNOSIS — I1A.0 RESISTANT HYPERTENSION: ICD-10-CM

## 2024-01-30 RX ORDER — CHLORTHALIDONE 25 MG/1
25 TABLET ORAL DAILY
Qty: 90 TABLET | Refills: 1 | Status: SHIPPED | OUTPATIENT
Start: 2024-01-30

## 2024-02-10 ENCOUNTER — HOSPITAL ENCOUNTER (OUTPATIENT)
Dept: CT IMAGING | Facility: HOSPITAL | Age: 69
Discharge: HOME | End: 2024-02-10
Payer: MEDICARE

## 2024-02-10 DIAGNOSIS — F17.210 CIGARETTE NICOTINE DEPENDENCE WITHOUT COMPLICATION: ICD-10-CM

## 2024-02-10 PROCEDURE — 71271 CT THORAX LUNG CANCER SCR C-: CPT

## 2024-02-12 ENCOUNTER — OFFICE VISIT (OUTPATIENT)
Dept: CARDIOLOGY CLINIC | Facility: CLINIC | Age: 69
End: 2024-02-12
Payer: MEDICARE

## 2024-02-12 VITALS
HEART RATE: 70 BPM | DIASTOLIC BLOOD PRESSURE: 72 MMHG | HEIGHT: 67 IN | OXYGEN SATURATION: 98 % | RESPIRATION RATE: 16 BRPM | WEIGHT: 167 LBS | SYSTOLIC BLOOD PRESSURE: 128 MMHG | BODY MASS INDEX: 26.21 KG/M2

## 2024-02-12 DIAGNOSIS — E11.69 HYPERLIPIDEMIA ASSOCIATED WITH TYPE 2 DIABETES MELLITUS: ICD-10-CM

## 2024-02-12 DIAGNOSIS — I25.119 CORONARY ARTERY DISEASE INVOLVING NATIVE CORONARY ARTERY OF NATIVE HEART WITH ANGINA PECTORIS (HCC): ICD-10-CM

## 2024-02-12 DIAGNOSIS — I15.2 HYPERTENSION COMPLICATING DIABETES: Primary | ICD-10-CM

## 2024-02-12 DIAGNOSIS — E11.59 HYPERTENSION COMPLICATING DIABETES: Primary | ICD-10-CM

## 2024-02-12 DIAGNOSIS — E78.5 HYPERLIPIDEMIA ASSOCIATED WITH TYPE 2 DIABETES MELLITUS: ICD-10-CM

## 2024-02-12 DIAGNOSIS — E11.9 DIABETES MELLITUS TYPE 2 IN NONOBESE (HCC): ICD-10-CM

## 2024-02-12 DIAGNOSIS — I42.9 CARDIOMYOPATHY, UNSPECIFIED TYPE (HCC): ICD-10-CM

## 2024-02-12 PROCEDURE — 99214 OFFICE O/P EST MOD 30 MIN: CPT | Performed by: INTERNAL MEDICINE

## 2024-02-12 NOTE — PROGRESS NOTES
Cardiology Follow Up    Steve Douglass  1955  [id number]  [facility] [place]  [address]  [facility] [place] [facility] [address] [phone]      CAD status post coronary stenting 11/2021  Hypertension  Hyperlipidemia  Type 2 diabetes  Smoker  Left bundle branch block  Claudication    Discussion/Summary: His leg symptoms do sound like stable claudication bilaterally.   No rest symptoms.  Check arterial vascular us if pt agrees. He is going to see vascular surgery 4/2024.  Management depending on results.     Continue aggressive medical therapy and risk factor reduction.  Continue asa.   Continue amlodipine, carvedilol, chlorthalidone, lisinopril and diabetic meds.  Smoking cessation advised.  Exercise program recommended.  Cholesterol is excellent and BP now well-controlled on increased Coreg.     He hasn't gotten knee surgery yet. Should he require knee surgery he can do over 4 METS of activity without angina and without cardiovascular symptoms.  Therefore he is stable from a cardiovascular standpoint to proceed with surgery.  Moderate risk given his history.    Recommend aggressive risk factor modification and therapeutic lifestyle changes.  Low-salt, low-calorie, low-fat, low-cholesterol diet with regular exercise and to optimize weight.  I will defer the ordering and monitoring of necessity lab studies to you, but I am available and happy to review and manage any of the data at your request in the future.    Discussed concepts of atherosclerosis, including signs and symptoms of cardiac disease.    Previous studies were reviewed.    Safety measures were reviewed.  All questions were entertained and answered.  Patient was advised to report any problems requiring medical attention.    Follow-up with PCP and appropriate specialist and lab work as discussed.    Return for follow up visit as scheduled or earlier, if  needed.  Thank you for allowing me to participate in the care and evaluation of your patient.  Should you have any questions, please feel free to contact me.    Interval History: 65-year-old male with coronary artery disease status post PCI of LPL1 on 11/11/2021 with drug-eluting stent x1, hypertension, hyperlipidemia, type 2 diabetes mellitus, JOELLE, smoker, left bundle-branch block presented for cardiology follow-up. Stress was done preop knee surgery and lead to the stent.    He was seen in the emergency room 2/26/23 with symptoms of sob, cp, dizziness, numbness, L face tingling,  panic attack.  Troponins were negative.  EKG with chronic left bundle branch block.  An echocardiogram was ordered but not performed.  CTA was negative for aortic aneurysm or aortic dissection.  CTA of the head revealed no acute intracranial abnormality.  Stable severe stenosis at the origin of the right ICA.    He now feels well. No cp, sob, mild benitez. He does c/o R > L bilateral calf pain when walking, colette a couple hundred yards, relieved by rest.     Cardiac cath November 11, 2021:  1st LPL lesion is 90% stenosed.  Left dominant coronary system   LP L1 90% stenosis status post successful PCI with drug-eluting stent x1  Mid circumflex 60 % stenosis     Lexiscan nuclear stress test October 27, 2021:  Resting and post EKG shows left bundle branch block  No chest discomfort with lexiscan  There is a moderate to large moderate intensity fixed inferoseptal and inferoapical defect indicating prior infarct  Inferoapical dyskinesis and inferoseptal hypokinesis.  Estimated ejection fraction=52%         Patient Active Problem List   Diagnosis    Hypertension complicating diabetes     Hyperosmolar hyponatremia    Dyslipidemia    Abnormal transaminases    Leukocytosis    Chest pain    Diabetes mellitus type 2 in nonobese (HCC)    Tobacco abuse    Cardiomyopathy (HCC)    Coronary artery disease involving native coronary artery of native heart with  angina pectoris (HCC)    Generalized anxiety disorder    Arthritis of right knee    Claudication of calf muscles (HCC)    Hypertensive heart disease without heart failure    Hyperlipidemia associated with type 2 diabetes mellitus     Cigarette nicotine dependence without complication    Lightheadedness    Numbness     Past Medical History:   Diagnosis Date    Anxiety     Arthritis     Colon polyp     Diabetes mellitus (HCC)     Hyperlipidemia     Hyperosmolar non-ketotic state in patient with type 2 diabetes mellitus ([facility]) 12/05/2019    Hypertension     Type 2 diabetes mellitus with hyperglycemia, with long-term current use of insulin ([facility]) 05/08/2020     Social History     Socioeconomic History    Marital status: /Civil Union     Spouse name: Not on file    Number of children: Not on file    Years of education: Not on file    Highest education level: Not on file   Occupational History    Not on file   Tobacco Use    Smoking status: Light Smoker     Current packs/day: 0.50     Average packs/day: 0.5 packs/day for 40.0 years (20.0 ttl pk-yrs)     Types: Cigarettes    Smokeless tobacco: Never   Vaping Use    Vaping status: Never Used   Substance and Sexual Activity    Alcohol use: Yes     Comment: occasionally    Drug use: Yes     Types: Marijuana     Comment: daily    Sexual activity: Not Currently     Partners: Female     Birth control/protection: None   Other Topics Concern    Not on file   Social History Narrative    Not on file     Social Determinants of Health     Financial Resource Strain: Low Risk  (8/31/2023)    Overall Financial Resource Strain (CARDIA)     Difficulty of Paying Living Expenses: Not hard at all   Food Insecurity: No Food Insecurity (2/27/2023)    Hunger Vital Sign     Worried About Running Out of Food in the Last Year: Never true     Ran Out of Food in the Last Year: Never true   Transportation Needs: No Transportation Needs (8/31/2023)    PRAPARE - Transportation     Lack of  Transportation (Medical): No     Lack of Transportation (Non-Medical): No   Physical Activity: Not on file   Stress: Not on file   Social Connections: Not on file   Intimate Partner Violence: Not on file   Housing Stability: Low Risk  (2/27/2023)    Housing Stability Vital Sign     Unable to Pay for Housing in the Last Year: No     Number of Places Lived in the Last Year: 1     Unstable Housing in the Last Year: No      Family History   Problem Relation Age of Onset    Diabetes Mother     Hyperlipidemia Mother     Dementia Mother     Hyperlipidemia Father      Past Surgical History:   Procedure Laterality Date    CARDIAC CATHETERIZATION N/A 11/11/2021    Procedure: Cardiac catheterization;  Surgeon: Mj Wakefield MD;  Location: [facility];  Service: Cardiology    CARDIAC CATHETERIZATION Left 11/11/2021    Procedure: Cardiac Coronary Angiogram;  Surgeon: Mj Wakefield MD;  Location: [facility];  Service: Cardiology    COLONOSCOPY  2010    KNEE SURGERY Right        Current Outpatient Medications:     amLODIPine (NORVASC) 10 mg tablet, TAKE ONE TABLET BY MOUTH EVERY DAY, Disp: 90 tablet, Rfl: 1    aspirin 81 mg chewable tablet, Chew 1 tablet (81 mg total) daily, Disp: 90 tablet, Rfl: 1    atorvastatin (LIPITOR) 40 mg tablet, TAKE ONE TABLET BY MOUTH EVERY DAY WITH DINNER, Disp: 90 tablet, Rfl: 0    buPROPion (Wellbutrin SR) 150 mg 12 hr tablet, Take 1 tablet (150 mg total) by mouth 2 (two) times a day, Disp: 180 tablet, Rfl: 0    carvedilol (COREG) 12.5 mg tablet, Take 2 tablets (25 mg total) by mouth 2 (two) times a day with meals, Disp: 180 tablet, Rfl: 4    chlorthalidone 25 mg tablet, TAKE ONE TABLET BY MOUTH EVERY DAY, Disp: 90 tablet, Rfl: 1    glimepiride (AMARYL) 2 mg tablet, TAKE ONE TABLET BY MOUTH EVERY DAY WITH BREAKFAST., Disp: 90 tablet, Rfl: 1    lisinopril (ZESTRIL) 40 mg tablet, TAKE ONE TABLET BY MOUTH EVERY DAY, Disp: 30 tablet, Rfl: 0    metFORMIN (GLUCOPHAGE) 500 mg  tablet, TAKE ONE TABLET BY MOUTH TWICE A DAY WITH MEALS, Disp: 90 tablet, Rfl: 2    Multiple Vitamins-Minerals (MULTIVITAMIN WITH MINERALS) tablet, Take 1 tablet by mouth daily, Disp: , Rfl:     busPIRone (BUSPAR) 10 mg tablet, TAKE ONE TABLET BY MOUTH TWICE A DAY, Disp: 60 tablet, Rfl: 5  No Known Allergies    Labs:  Office Visit on 01/23/2024   Component Date Value    Hemoglobin A1C 01/23/2024 6.4    Hospital Outpatient Visit on 01/15/2024   Component Date Value    POC Glucose 01/15/2024 111     Case Report 01/15/2024                      Value:Surgical Pathology Report                         Case: [id number]                                  Authorizing Provider:  Eric Panchal DO          Collected:           01/15/2024 0727              Ordering Location:      [facility]       Received:            01/15/2024 [address] [place] [address] Endoscopy                                                             Pathologist:           Danielle Weems MD                                                                    Specimens:   A) - Polyp, Colorectal, transverse x2                                                               B) - Polyp, Colorectal, sigmoid                                                                     C) - Polyp, Colorectal, rectum                                                             Final Diagnosis 01/15/2024                      Value:This result contains rich text formatting which cannot be displayed here.    Additional Information 01/15/2024                      Value:This result contains rich text formatting which cannot be displayed here.    Synoptic Checklist 01/15/2024                      Value:                            COLON/RECTUM POLYP FORM - GI - All Specimens                                                                                     :    Adenoma(s)                                                         :    Serrated Adenoma                                                          :    Other                                                         :    HP      Gross Description 01/15/2024                      Value:This result contains rich text formatting which cannot be displayed here.    Clinical Information 01/15/2024                      Value:Cold snare polypectomy     Imaging: No results found.    Review of Systems:  Review of Systems review of systems is negative    Physical Exam:  Physical Exam GEN: Alert and oriented x 3, in no acute distress.  Well appearing and well nourished.   HEENT: Sclera anicteric, conjunctivae pink, mucous membranes moist. Oropharynx clear.   NECK: Supple, no carotid bruits, no significant JVD. Trachea midline, no thyromegaly.   HEART: Regular rhythm, normal S1 and S2, 2/6 systolic murmur, clicks, gallops or rubs. PMI nondisplaced, no thrills.   LUNGS: Clear to auscultation bilaterally; no wheezes, rales, or rhonchi. No increased work of breathing or signs of respiratory distress.   ABDOMEN: Soft, nontender, nondistended, normoactive bowel sounds.   EXTREMITIES: Skin warm and well perfused, no clubbing, cyanosis, or edema.  NEURO: No focal findings. Normal speech. Mood and affect normal.   SKIN: Normal without suspicious lesions on exposed skin.

## 2024-02-16 DIAGNOSIS — I10 ESSENTIAL HYPERTENSION: ICD-10-CM

## 2024-02-16 RX ORDER — LISINOPRIL 40 MG/1
TABLET ORAL
Qty: 30 TABLET | Refills: 0 | Status: SHIPPED | OUTPATIENT
Start: 2024-02-16

## 2024-03-18 DIAGNOSIS — I10 ESSENTIAL HYPERTENSION: ICD-10-CM

## 2024-03-18 RX ORDER — LISINOPRIL 40 MG/1
TABLET ORAL
Qty: 30 TABLET | Refills: 0 | Status: SHIPPED | OUTPATIENT
Start: 2024-03-18

## 2024-04-02 ENCOUNTER — HOSPITAL ENCOUNTER (OUTPATIENT)
Dept: VASCULAR ULTRASOUND | Facility: HOSPITAL | Age: 69
Discharge: HOME | End: 2024-04-02
Payer: MEDICARE

## 2024-04-02 DIAGNOSIS — E78.5 DYSLIPIDEMIA: ICD-10-CM

## 2024-04-02 DIAGNOSIS — I73.9 CLAUDICATION OF CALF MUSCLES (HCC): ICD-10-CM

## 2024-04-02 PROCEDURE — 93925 LOWER EXTREMITY STUDY: CPT

## 2024-04-02 PROCEDURE — 93923 UPR/LXTR ART STDY 3+ LVLS: CPT

## 2024-04-02 PROCEDURE — 93925 LOWER EXTREMITY STUDY: CPT | Performed by: SURGERY

## 2024-04-02 PROCEDURE — 93922 UPR/L XTREMITY ART 2 LEVELS: CPT | Performed by: SURGERY

## 2024-04-02 RX ORDER — ATORVASTATIN CALCIUM 40 MG/1
40 TABLET, FILM COATED ORAL DAILY
Qty: 90 TABLET | Refills: 1 | Status: SHIPPED | OUTPATIENT
Start: 2024-04-02

## 2024-04-11 DIAGNOSIS — E11.9 DIABETES MELLITUS TYPE 2 IN NONOBESE (HCC): ICD-10-CM

## 2024-04-17 DIAGNOSIS — I10 ESSENTIAL HYPERTENSION: ICD-10-CM

## 2024-04-18 RX ORDER — LISINOPRIL 40 MG/1
TABLET ORAL
Qty: 30 TABLET | Refills: 0 | Status: SHIPPED | OUTPATIENT
Start: 2024-04-18

## 2024-05-07 DIAGNOSIS — F17.210 CIGARETTE NICOTINE DEPENDENCE WITHOUT COMPLICATION: ICD-10-CM

## 2024-05-07 RX ORDER — BUPROPION HYDROCHLORIDE 150 MG/1
150 TABLET, EXTENDED RELEASE ORAL 2 TIMES DAILY
Qty: 180 TABLET | Refills: 1 | Status: SHIPPED | OUTPATIENT
Start: 2024-05-07

## 2024-05-16 DIAGNOSIS — I10 ESSENTIAL HYPERTENSION: ICD-10-CM

## 2024-05-17 DIAGNOSIS — I10 PRIMARY HYPERTENSION: ICD-10-CM

## 2024-05-17 RX ORDER — CARVEDILOL 12.5 MG/1
25 TABLET ORAL 2 TIMES DAILY WITH MEALS
Qty: 360 TABLET | Refills: 1 | Status: SHIPPED | OUTPATIENT
Start: 2024-05-17

## 2024-05-17 RX ORDER — LISINOPRIL 40 MG/1
TABLET ORAL
Qty: 30 TABLET | Refills: 5 | Status: SHIPPED | OUTPATIENT
Start: 2024-05-17

## 2024-05-17 NOTE — TELEPHONE ENCOUNTER
Reason for call:   [x] Refill   [] Prior Auth  [] Other:     Office:   [] PCP/Provider -   [x] Specialty/Provider -     Medication: carvedilol (COREG) 12.5 mg tablet     Dose/Frequency: Take 2 tablets (25 mg total) by mouth 2 (two) times a day with meals     Quantity: 360 + 1 refill    Pharmacy: [id number] - [place] - [phone]     Does the patient have enough for 3 days?   [] Yes   [x] No - Send as HP to POD

## 2024-06-16 DIAGNOSIS — I10 ESSENTIAL HYPERTENSION: ICD-10-CM

## 2024-06-16 RX ORDER — AMLODIPINE BESYLATE 10 MG/1
TABLET ORAL
Qty: 90 TABLET | Refills: 1 | Status: SHIPPED | OUTPATIENT
Start: 2024-06-16

## 2024-07-05 DIAGNOSIS — I1A.0 RESISTANT HYPERTENSION: ICD-10-CM

## 2024-07-05 DIAGNOSIS — E11.00 HYPEROSMOLAR NON-KETOTIC STATE IN PATIENT WITH TYPE 2 DIABETES MELLITUS (HCC): ICD-10-CM

## 2024-07-05 DIAGNOSIS — F41.1 GENERALIZED ANXIETY DISORDER: ICD-10-CM

## 2024-07-05 RX ORDER — GLIMEPIRIDE 2 MG/1
2 TABLET ORAL
Qty: 90 TABLET | Refills: 1 | Status: SHIPPED | OUTPATIENT
Start: 2024-07-05

## 2024-07-05 RX ORDER — CHLORTHALIDONE 25 MG/1
25 TABLET ORAL DAILY
Qty: 90 TABLET | Refills: 1 | Status: SHIPPED | OUTPATIENT
Start: 2024-07-05

## 2024-07-05 RX ORDER — BUSPIRONE HYDROCHLORIDE 10 MG/1
10 TABLET ORAL 2 TIMES DAILY
Qty: 200 TABLET | Refills: 1 | Status: SHIPPED | OUTPATIENT
Start: 2024-07-05

## 2024-07-05 NOTE — TELEPHONE ENCOUNTER
Reason for call:   [x] Refill   [] Prior Auth  [] Other:     Office:   [x] PCP/Provider - [facility]  [] Specialty/Provider -     Medication:      Pharmacy: [id number] [place] [place] - [address] [phone]     Does the patient have enough for 3 days?   [] Yes   [x] No - Send as HP to POD

## 2024-07-08 DIAGNOSIS — F41.1 GENERALIZED ANXIETY DISORDER: ICD-10-CM

## 2024-07-08 DIAGNOSIS — E11.9 DIABETES MELLITUS TYPE 2 IN NONOBESE (HCC): ICD-10-CM

## 2024-07-09 RX ORDER — BUSPIRONE HYDROCHLORIDE 10 MG/1
10 TABLET ORAL 2 TIMES DAILY
Qty: 200 TABLET | Refills: 1 | OUTPATIENT
Start: 2024-07-09

## 2024-08-12 ENCOUNTER — TELEPHONE (OUTPATIENT)
Age: 69
End: 2024-08-12

## 2024-08-13 ENCOUNTER — TELEPHONE (OUTPATIENT)
Age: 69
End: 2024-08-13

## 2024-08-13 NOTE — TELEPHONE ENCOUNTER
Patient moved to [facility] and set up TNP but gave first available.  He is pretty sure he will run out of metformin by then.  He is on wait list for sooner appointment.  Please review schedule to see if sooner appointment so he will not run out of medicine.

## 2024-08-13 NOTE — TELEPHONE ENCOUNTER
Spoke with patient. Last PCP was [facility]. Review scheduled for any new patient appointments, none available. Will call patient if there is a cancellation.

## 2024-08-15 ENCOUNTER — APPOINTMENT (OUTPATIENT)
Dept: LAB | Facility: CLINIC | Age: 69
End: 2024-08-15
Payer: MEDICARE

## 2024-08-15 DIAGNOSIS — E11.9 DIABETES MELLITUS TYPE 2 IN NONOBESE (HCC): ICD-10-CM

## 2024-08-15 LAB
ALBUMIN SERPL BCG-MCNC: 4.3 G/DL (ref 3.5–5)
ALP SERPL-CCNC: 43 U/L (ref 34–104)
ALT SERPL W P-5'-P-CCNC: 18 U/L (ref 7–52)
ANION GAP SERPL CALCULATED.3IONS-SCNC: 10 MMOL/L (ref 4–13)
AST SERPL W P-5'-P-CCNC: 14 U/L (ref 13–39)
BASOPHILS # BLD AUTO: 0.07 THOUSANDS/ÂΜL (ref 0–0.1)
BASOPHILS NFR BLD AUTO: 1 % (ref 0–1)
BILIRUB SERPL-MCNC: 0.31 MG/DL (ref 0.2–1)
BUN SERPL-MCNC: 26 MG/DL (ref 5–25)
CALCIUM SERPL-MCNC: 9.8 MG/DL (ref 8.4–10.2)
CHLORIDE SERPL-SCNC: 102 MMOL/L (ref 96–108)
CHOLEST SERPL-MCNC: 149 MG/DL
CO2 SERPL-SCNC: 28 MMOL/L (ref 21–32)
CREAT SERPL-MCNC: 1.27 MG/DL (ref 0.6–1.3)
CREAT UR-MCNC: 118.1 MG/DL
EOSINOPHIL # BLD AUTO: 0.42 THOUSAND/ÂΜL (ref 0–0.61)
EOSINOPHIL NFR BLD AUTO: 4 % (ref 0–6)
ERYTHROCYTE [DISTWIDTH] IN BLOOD BY AUTOMATED COUNT: 13.2 % (ref 11.6–15.1)
EST. AVERAGE GLUCOSE BLD GHB EST-MCNC: 131 MG/DL
GFR SERPL CREATININE-BSD FRML MDRD: 57 ML/MIN/1.73SQ M
GLUCOSE P FAST SERPL-MCNC: 157 MG/DL (ref 65–99)
HBA1C MFR BLD: 6.2 %
HCT VFR BLD AUTO: 41.4 % (ref 36.5–49.3)
HDLC SERPL-MCNC: 48 MG/DL
HGB BLD-MCNC: 12.9 G/DL (ref 12–17)
IMM GRANULOCYTES # BLD AUTO: 0.07 THOUSAND/UL (ref 0–0.2)
IMM GRANULOCYTES NFR BLD AUTO: 1 % (ref 0–2)
LDLC SERPL CALC-MCNC: 63 MG/DL (ref 0–100)
LYMPHOCYTES # BLD AUTO: 2.86 THOUSANDS/ÂΜL (ref 0.6–4.47)
LYMPHOCYTES NFR BLD AUTO: 24 % (ref 14–44)
MCH RBC QN AUTO: 31.4 PG (ref 26.8–34.3)
MCHC RBC AUTO-ENTMCNC: 31.2 G/DL (ref 31.4–37.4)
MCV RBC AUTO: 101 FL (ref 82–98)
MICROALBUMIN UR-MCNC: 81.1 MG/L
MICROALBUMIN/CREAT 24H UR: 69 MG/G CREATININE (ref 0–30)
MONOCYTES # BLD AUTO: 1.16 THOUSAND/ÂΜL (ref 0.17–1.22)
MONOCYTES NFR BLD AUTO: 10 % (ref 4–12)
NEUTROPHILS # BLD AUTO: 7.22 THOUSANDS/ÂΜL (ref 1.85–7.62)
NEUTS SEG NFR BLD AUTO: 60 % (ref 43–75)
NRBC BLD AUTO-RTO: 0 /100 WBCS
PLATELET # BLD AUTO: 211 THOUSANDS/UL (ref 149–390)
PMV BLD AUTO: 11.9 FL (ref 8.9–12.7)
POTASSIUM SERPL-SCNC: 4.1 MMOL/L (ref 3.5–5.3)
PROT SERPL-MCNC: 7.3 G/DL (ref 6.4–8.4)
RBC # BLD AUTO: 4.11 MILLION/UL (ref 3.88–5.62)
SODIUM SERPL-SCNC: 140 MMOL/L (ref 135–147)
TRIGL SERPL-MCNC: 190 MG/DL
WBC # BLD AUTO: 11.8 THOUSAND/UL (ref 4.31–10.16)

## 2024-08-15 PROCEDURE — 85025 COMPLETE CBC W/AUTO DIFF WBC: CPT

## 2024-08-15 PROCEDURE — 80053 COMPREHEN METABOLIC PANEL: CPT

## 2024-08-15 PROCEDURE — 80061 LIPID PANEL: CPT

## 2024-08-15 PROCEDURE — 82570 ASSAY OF URINE CREATININE: CPT

## 2024-08-15 PROCEDURE — 83036 HEMOGLOBIN GLYCOSYLATED A1C: CPT

## 2024-08-15 PROCEDURE — 36415 COLL VENOUS BLD VENIPUNCTURE: CPT

## 2024-08-15 PROCEDURE — 82043 UR ALBUMIN QUANTITATIVE: CPT

## 2024-08-27 DIAGNOSIS — E11.9 DIABETES MELLITUS TYPE 2 IN NONOBESE (HCC): ICD-10-CM

## 2024-08-27 NOTE — TELEPHONE ENCOUNTER
Patient has an appointment with a new PCP on 9/2/24 and would like a refill to last him until then.         Reason for call:   [x] Refill   [] Prior Auth  [] Other:     Office:   [x] PCP/Provider -   [] Specialty/Provider -     Medication: Metformin 500 mg, take 1 tablet by mouth 2 times a day with meals      Pharmacy: [facility]    Does the patient have enough for 3 days?   [x] Yes   [] No - Send as HP to POD

## 2024-09-04 ENCOUNTER — OFFICE VISIT (OUTPATIENT)
Dept: FAMILY MEDICINE CLINIC | Facility: CLINIC | Age: 69
End: 2024-09-04
Payer: MEDICARE

## 2024-09-04 VITALS
HEART RATE: 68 BPM | HEIGHT: 66 IN | WEIGHT: 168.25 LBS | SYSTOLIC BLOOD PRESSURE: 100 MMHG | DIASTOLIC BLOOD PRESSURE: 68 MMHG | BODY MASS INDEX: 27.04 KG/M2 | TEMPERATURE: 97.9 F | OXYGEN SATURATION: 98 %

## 2024-09-04 DIAGNOSIS — E11.9 DIABETES MELLITUS TYPE 2 IN NONOBESE (HCC): ICD-10-CM

## 2024-09-04 DIAGNOSIS — Z76.89 ENCOUNTER TO ESTABLISH CARE: ICD-10-CM

## 2024-09-04 DIAGNOSIS — Z72.0 TOBACCO ABUSE: ICD-10-CM

## 2024-09-04 DIAGNOSIS — I25.119 CORONARY ARTERY DISEASE INVOLVING NATIVE CORONARY ARTERY OF NATIVE HEART WITH ANGINA PECTORIS (HCC): ICD-10-CM

## 2024-09-04 DIAGNOSIS — I73.9 CLAUDICATION OF CALF MUSCLES (HCC): ICD-10-CM

## 2024-09-04 DIAGNOSIS — I11.9 HYPERTENSIVE HEART DISEASE WITHOUT HEART FAILURE: Primary | ICD-10-CM

## 2024-09-04 DIAGNOSIS — F41.1 GENERALIZED ANXIETY DISORDER: ICD-10-CM

## 2024-09-04 PROCEDURE — 1124F ACP DISCUSS-NO DSCNMKR DOCD: CPT | Performed by: STUDENT IN AN ORGANIZED HEALTH CARE EDUCATION/TRAINING PROGRAM

## 2024-09-04 PROCEDURE — 99214 OFFICE O/P EST MOD 30 MIN: CPT | Performed by: STUDENT IN AN ORGANIZED HEALTH CARE EDUCATION/TRAINING PROGRAM

## 2024-09-04 PROCEDURE — G2211 COMPLEX E/M VISIT ADD ON: HCPCS | Performed by: STUDENT IN AN ORGANIZED HEALTH CARE EDUCATION/TRAINING PROGRAM

## 2024-09-04 NOTE — ASSESSMENT & PLAN NOTE
Patient presents today to establish care, has PMHX of DM, Cardiomyopathy, CAD s/p 1 stent placement 2 years ago, claudication. Patient has well controlled diabetes and keeps up with health. Recently moved as such transferring providers.

## 2024-09-04 NOTE — ASSESSMENT & PLAN NOTE
Recent vasc duplex confirming diagnosis.   Continue ASA and Lipitor.   Patient reports maintaining mobility and walks several times per week

## 2024-09-04 NOTE — ASSESSMENT & PLAN NOTE
Well controlled, recent A1c 6.2  Continue Metformin, Amaryl  Patient interested in GLP1 but does not have prescription insurance, in addition I do believe that his diabetes is well controlled with current diet and treatment regimen.   Due for eye exam  3 month labs ordered.   Lab Results   Component Value Date    HGBA1C 6.2 (H) 08/15/2024

## 2024-09-04 NOTE — PROGRESS NOTES
Ambulatory Visit  Name: Steve Douglass      : 1955      MRN: [id number]  Encounter Provider: Ally Diaz DO  Encounter Date: 2024   Encounter department: [facility]    Assessment & Plan   1. Hypertensive heart disease without heart failure  Assessment & Plan:  BP stable continue coreg, lisinopril, and norvasc  2. Coronary artery disease involving native coronary artery of native heart with angina pectoris (HCC)  Assessment & Plan:  Has one stent placed 2 years ago, continue Lipitor.  3. Tobacco abuse  Assessment & Plan:  40pack year history- smoking 5-10 cigarettes per day   4. Generalized anxiety disorder  Assessment & Plan:  Continue wellbutrin and buspar  5. Claudication of calf muscles (HCC)  Assessment & Plan:  Recent vasc duplex confirming diagnosis.   Continue ASA and Lipitor.   Patient reports maintaining mobility and walks several times per week  6. Diabetes mellitus type 2 in nonobese (HCC)  Assessment & Plan:  Well controlled, recent A1c 6.2  Continue Metformin, Amaryl  Patient interested in GLP1 but does not have prescription insurance, in addition I do believe that his diabetes is well controlled with current diet and treatment regimen.   Due for eye exam  3 month labs ordered.   Lab Results   Component Value Date    HGBA1C 6.2 (H) 08/15/2024     Orders:  -     Ambulatory Referral to Ophthalmology; Future  -     Albumin / creatinine urine ratio; Future; Expected date: 2024  -     Comprehensive metabolic panel; Future; Expected date: 2024  -     Hemoglobin A1C; Future; Expected date: 2024  -     CBC and Platelet; Future; Expected date: 2024  -     Lipid Panel with Direct LDL reflex; Future; Expected date: 2024  7. Encounter to establish care  Assessment & Plan:  Patient presents today to establish care, has PMHX of DM, Cardiomyopathy, CAD s/p 1 stent placement 2 years ago, claudication. Patient has well controlled diabetes  "and keeps up with health. Recently moved as such transferring providers.        History of Present Illness     Patient presents today to establish care  Has no acute complaints or concerns  States that the pain in his lower extremities with ambulation is most bothersome but he has been able to manage and still walks a few times per week as exercise.         Review of Systems   Constitutional:  Negative for chills and fever.   HENT:  Negative for congestion and rhinorrhea.    Respiratory:  Negative for cough and shortness of breath.    Cardiovascular:  Negative for chest pain and palpitations.   Gastrointestinal:  Negative for abdominal pain, constipation and diarrhea.   Neurological:  Negative for dizziness and headaches.       Objective     /68 (BP Location: Left arm, Patient Position: Sitting, Cuff Size: Large)   Pulse 68   Temp 97.9 °F (36.6 °C) (Temporal)   Ht 5' 6\" (1.676 m)   Wt 76.3 kg (168 lb 4 oz)   SpO2 98%   BMI 27.16 kg/m²     Diabetic Foot Exam    Patient's shoes and socks removed.    Right Foot/Ankle   Right Foot Inspection  Skin Exam: skin normal and skin intact. No dry skin, no warmth, no callus, no erythema, no maceration, no abnormal color, no pre-ulcer, no ulcer and no callus.     Toe Exam: ROM and strength within normal limits.     Sensory   Proprioception: intact  Monofilament testing: intact    Vascular  The right DP pulse is 1+. The right PT pulse is 1+.     Left Foot/Ankle  Left Foot Inspection  Skin Exam: skin normal and skin intact. No dry skin, no warmth, no erythema, no maceration, normal color, no pre-ulcer, no ulcer and no callus.     Toe Exam: ROM and strength within normal limits.     Sensory   Proprioception: intact  Monofilament testing: intact    Vascular  The left DP pulse is 1+. The left PT pulse is 1+.     Assign Risk Category  No deformity present  No loss of protective sensation  Weak pulses  Risk: 1      Physical Exam  Vitals reviewed.   Constitutional:       " Appearance: Normal appearance.   HENT:      Head: Normocephalic and atraumatic.      Mouth/Throat:      Mouth: Mucous membranes are moist.      Pharynx: No oropharyngeal exudate or posterior oropharyngeal erythema.   Eyes:      Extraocular Movements: Extraocular movements intact.   Cardiovascular:      Rate and Rhythm: Normal rate and regular rhythm.      Pulses: Pulses are weak.           Dorsalis pedis pulses are 1+ on the right side and 1+ on the left side.        Posterior tibial pulses are 1+ on the right side and 1+ on the left side.      Heart sounds: Normal heart sounds.   Pulmonary:      Effort: Pulmonary effort is normal.      Breath sounds: Normal breath sounds.   Feet:      Right foot:      Skin integrity: No ulcer, skin breakdown, erythema, warmth, callus or dry skin.      Left foot:      Skin integrity: No ulcer, skin breakdown, erythema, warmth, callus or dry skin.   Neurological:      General: No focal deficit present.      Mental Status: He is alert and oriented to person, place, and time.   Psychiatric:         Mood and Affect: Mood normal.         Behavior: Behavior normal.       Administrative Statements

## 2024-09-17 DIAGNOSIS — E78.5 DYSLIPIDEMIA: ICD-10-CM

## 2024-09-17 RX ORDER — ATORVASTATIN CALCIUM 40 MG/1
40 TABLET, FILM COATED ORAL DAILY
Qty: 90 TABLET | Refills: 1 | Status: SHIPPED | OUTPATIENT
Start: 2024-09-17 | End: 2024-09-17 | Stop reason: SDUPTHER

## 2024-09-17 RX ORDER — ATORVASTATIN CALCIUM 40 MG/1
40 TABLET, FILM COATED ORAL DAILY
Qty: 90 TABLET | Refills: 1 | Status: SHIPPED | OUTPATIENT
Start: 2024-09-17

## 2024-09-26 ENCOUNTER — RA CDI HCC (OUTPATIENT)
Dept: OTHER | Facility: HOSPITAL | Age: 69
End: 2024-09-26

## 2024-09-26 DIAGNOSIS — E11.9 DIABETES MELLITUS TYPE 2 IN NONOBESE (HCC): ICD-10-CM

## 2024-09-26 PROBLEM — Z76.89 ENCOUNTER TO ESTABLISH CARE: Status: RESOLVED | Noted: 2021-04-20 | Resolved: 2024-09-26

## 2024-09-26 NOTE — PROGRESS NOTES
HCC coding opportunities          Chart Reviewed number of suggestions sent to Provider: 1     Patients Insurance   E11.51  Medicare Insurance: Medicare

## 2024-09-26 NOTE — TELEPHONE ENCOUNTER
Reason for call:   [x] Refill   [] Prior Auth  [] Other:     Office:   [x] PCP/Provider - Joe   [] Specialty/Provider -     Medication: Metformin    Dose/Frequency: 1 tab bid    Quantity: 60    Pharmacy: [id number] - [facility] [place] - [address] [place] [address] [address] [phone]     Does the patient have enough for 3 days?   [x] Yes   [] No -

## 2024-10-01 LAB
LEFT EYE DIABETIC RETINOPATHY: NORMAL
RIGHT EYE DIABETIC RETINOPATHY: NORMAL

## 2024-10-02 ENCOUNTER — TELEPHONE (OUTPATIENT)
Dept: FAMILY MEDICINE CLINIC | Facility: CLINIC | Age: 69
End: 2024-10-02

## 2024-10-02 ENCOUNTER — OFFICE VISIT (OUTPATIENT)
Dept: FAMILY MEDICINE CLINIC | Facility: CLINIC | Age: 69
End: 2024-10-02
Payer: MEDICARE

## 2024-10-02 VITALS
BODY MASS INDEX: 27.64 KG/M2 | SYSTOLIC BLOOD PRESSURE: 114 MMHG | HEART RATE: 67 BPM | WEIGHT: 172 LBS | DIASTOLIC BLOOD PRESSURE: 66 MMHG | HEIGHT: 66 IN | TEMPERATURE: 97.6 F | OXYGEN SATURATION: 97 %

## 2024-10-02 DIAGNOSIS — Z23 ENCOUNTER FOR IMMUNIZATION: ICD-10-CM

## 2024-10-02 DIAGNOSIS — Z23 NEED FOR COVID-19 VACCINE: ICD-10-CM

## 2024-10-02 DIAGNOSIS — Z12.5 PROSTATE CANCER SCREENING: ICD-10-CM

## 2024-10-02 DIAGNOSIS — F17.200 TOBACCO DEPENDENCE: ICD-10-CM

## 2024-10-02 DIAGNOSIS — Z00.00 MEDICARE ANNUAL WELLNESS VISIT, SUBSEQUENT: Primary | ICD-10-CM

## 2024-10-02 PROCEDURE — 90662 IIV NO PRSV INCREASED AG IM: CPT

## 2024-10-02 PROCEDURE — G0008 ADMIN INFLUENZA VIRUS VAC: HCPCS

## 2024-10-02 PROCEDURE — G0439 PPPS, SUBSEQ VISIT: HCPCS | Performed by: STUDENT IN AN ORGANIZED HEALTH CARE EDUCATION/TRAINING PROGRAM

## 2024-10-02 PROCEDURE — 90480 ADMN SARSCOV2 VAC 1/ONLY CMP: CPT

## 2024-10-02 PROCEDURE — 91320 SARSCV2 VAC 30MCG TRS-SUC IM: CPT

## 2024-10-02 PROCEDURE — 99214 OFFICE O/P EST MOD 30 MIN: CPT | Performed by: STUDENT IN AN ORGANIZED HEALTH CARE EDUCATION/TRAINING PROGRAM

## 2024-10-02 NOTE — TELEPHONE ENCOUNTER
Left message for patient making him aware that [place]ing [place]ac[place] Application paperwork is ready for pickup.

## 2024-10-02 NOTE — PROGRESS NOTES
Ambulatory Visit  Name: Steve Douglass      : 1955      MRN: [id number]  Encounter Provider: Ally Diaz DO  Encounter Date: 10/2/2024   Encounter department: [facility]    Assessment & Plan  Medicare annual wellness visit, subsequent  Medicare annual wellness visit completed, patient up-to-date on all screenings including colon cancer screening, lung cancer screening.  However he is due for PSA screening which I have placed an order for.  COVID and flu vaccine both administered today.  Recommend shingles vaccine and updated Tdap at local pharmacy.       Prostate cancer screening    Orders:    PSA, Total Screen; Future    Encounter for immunization    Orders:    influenza vaccine, high-dose, PF 0.5 mL (Fluzone High Dose)    Need for COVID-19 vaccine    Orders:    COVID-19 Pfizer mRNA vaccine 12 yr and older (Comirnaty pre-filled syringe)    Tobacco dependence  Discussed plan for quitting, patient not ready to quit at this time.  Does report he is still smoking about half a pack a day.            Preventive health issues were discussed with patient, and age appropriate screening tests were ordered as noted in patient's After Visit Summary. Personalized health advice and appropriate referrals for health education or preventive services given if needed, as noted in patient's After Visit Summary.    History of Present Illness       Patient presents today for AWV.         Patient Care Team:  Ally Diaz DO as PCP - General (Family Medicine)  Roberth Parker MD as PCP - PCP-[facility] ([facility])    Review of Systems   HENT:  Negative for congestion and rhinorrhea.    Respiratory:  Negative for cough and shortness of breath.    Cardiovascular:  Negative for chest pain and palpitations.   Gastrointestinal:  Negative for abdominal pain, constipation, diarrhea, nausea and vomiting.   Neurological:  Negative for dizziness and headaches.       Medical History Reviewed by  provider this encounter:  Tobacco  Allergies  Meds  Problems  Med Hx  Surg Hx  Fam Hx       Annual Wellness Visit Questionnaire   Steve is here for his Subsequent Wellness visit.     Health Risk Assessment:   Patient rates overall health as good. Patient feels that their physical health rating is same. Patient is satisfied with their life. Eyesight was rated as same. Hearing was rated as same. Patient feels that their emotional and mental health rating is same. Patients states they are sometimes angry. Patient states they are sometimes unusually tired/fatigued. Pain experienced in the last 7 days has been a lot. Patient's pain rating has been 8/10. Patient states that he has experienced no weight loss or gain in last 6 months.     Depression Screening:   PHQ-2 Score: 0      Fall Risk Screening:   In the past year, patient has experienced: no history of falling in past year      Home Safety:  Patient does not have trouble with stairs inside or outside of their home. Patient has working smoke alarms and has no working carbon monoxide detector. Home safety hazards include: none.     Nutrition:   Current diet is Diabetic.     Medications:   Patient is currently taking over-the-counter supplements. OTC medications include: see medication list. Patient is able to manage medications.     Activities of Daily Living (ADLs)/Instrumental Activities of Daily Living (IADLs):   Walk and transfer into and out of bed and chair?: Yes  Dress and groom yourself?: Yes    Bathe or shower yourself?: Yes    Feed yourself? Yes  Do your laundry/housekeeping?: Yes  Manage your money, pay your bills and track your expenses?: Yes  Make your own meals?: Yes    Do your own shopping?: Yes    Previous Hospitalizations:   Any hospitalizations or ED visits within the last 12 months?: No      Advance Care Planning:   Living will: No    Durable POA for healthcare: No    Advanced directive: No      PREVENTIVE SCREENINGS      Cardiovascular  Screening:    General: History Lipid Disorder      Diabetes Screening:     General: History Diabetes      Colorectal Cancer Screening:     General: Screening Current      Prostate Cancer Screening:    General: Risks and Benefits Discussed    Due for: PSA      Osteoporosis Screening:    General: Screening Not Indicated      Abdominal Aortic Aneurysm (AAA) Screening:    Risk factors include: age between 65-76 yo and tobacco use        Lung Cancer Screening:     General: Screening Current    Screening, Brief Intervention, and Referral to Treatment (SBIRT)    Screening  Typical number of drinks in a day: 2  Typical number of drinks in a week: 10  Interpretation: Low risk drinking behavior.    AUDIT-C Screenin) How often did you have a drink containing alcohol in the past year? 2 to 3 times a week  2) How many drinks did you have on a typical day when you were drinking in the past year? 1 to 2  3) How often did you have 6 or more drinks on one occasion in the past year? less than monthly    AUDIT-C Score: 4  Interpretation: Score 4-12 (male): POSITIVE screen for alcohol misuse    AUDIT Screenin) How often during the last year have you found that you were not able to stop drinking once you had started? 0 - never  5) How often during the last year have you failed to do what was normally expected from you because of drinking? 0 - never  6) How often during the last year have you needed a first drink in the morning to get yourself going after a heavy drinking session? 0 - never  7) How often during the last year have you had a feeling of guilt or remorse after drinking? 0 - never  8) How often during the last year have you been unable to remember what happened the night before because you had been drinking? 0 - never  9) Have you or someone else been injured as a result of your drinking? 0 - no  10) Has a relative or friend or a doctor or another health worker been concerned about your drinking or suggested you cut  "down? 0 - no    AUDIT Score: 4  Interpretation: Low risk alcohol consumption    Single Item Drug Screening:  How often have you used an illegal drug (including marijuana) or a prescription medication for non-medical reasons in the past year? daily or almost daily    Single Item Drug Screen Score: 4  Interpretation: POSITIVE screen for possible drug use disorder    Drug Abuse Screening Test (DAST-10):  1) Have you used drugs other than those required for medical reasons? No  2) Do you abuse more than one drug at a time? No  3) Are you always able to stop using drugs when you want to? Yes  4) Have you had \"blackouts\" or \"flashbacks\" as a result of drug use? No  5) Do you ever feel bad or guilty about your drug use? No  6) Does your spouse (or parents) ever complain about your involvement with drugs? No  7) Have you neglected your family because of your use of drugs? No  8) Have you engaged in illegal activities in order to obtain drugs? No  9) Have you ever experienced withdrawal symptoms (felt sick) when you stopped taking drugs? No  10) Have you had medical problems as a result of your drug use (e.g., memory loss, hepatitis, convulsions, bleeding, etc.)? No    DAST-10 Score: 0  Interpretation: No problems reported    Social Determinants of Health     Financial Resource Strain: Low Risk  (8/31/2023)    Overall Financial Resource Strain (CARDIA)     Difficulty of Paying Living Expenses: Not hard at all   Food Insecurity: No Food Insecurity (9/29/2024)    Hunger Vital Sign     Worried About Running Out of Food in the Last Year: Never true     Ran Out of Food in the Last Year: Never true   Transportation Needs: No Transportation Needs (9/29/2024)    PRAPARE - Transportation     Lack of Transportation (Medical): No     Lack of Transportation (Non-Medical): No   Housing Stability: Low Risk  (9/29/2024)    Housing Stability Vital Sign     Unable to Pay for Housing in the Last Year: No     Number of Times Moved in the Last " "Year: 1     Homeless in the Last Year: No   Utilities: Not At Risk (9/29/2024)    [facility] Utilities     Threatened with loss of utilities: No     No results found.    Objective     /66 (BP Location: Left arm, Patient Position: Sitting, Cuff Size: Large)   Pulse 67   Temp 97.6 °F (36.4 °C) (Temporal)   Ht 5' 6\" (1.676 m)   Wt 78 kg (172 lb)   SpO2 97%   BMI 27.76 kg/m²     Physical Exam  Vitals reviewed.   Constitutional:       Appearance: Normal appearance.   HENT:      Head: Normocephalic and atraumatic.      Nose: No congestion or rhinorrhea.   Cardiovascular:      Rate and Rhythm: Normal rate and regular rhythm.      Heart sounds: Normal heart sounds.   Pulmonary:      Effort: Pulmonary effort is normal.      Breath sounds: Normal breath sounds.   Neurological:      General: No focal deficit present.      Mental Status: He is alert and oriented to person, place, and time.         "

## 2024-10-02 NOTE — PATIENT INSTRUCTIONS
Medicare Preventive Visit Patient Instructions  Thank you for completing your Welcome to Medicare Visit or Medicare Annual Wellness Visit today. Your next wellness visit will be due in one year (10/3/2025).  The screening/preventive services that you may require over the next 5-10 years are detailed below. Some tests may not apply to you based off risk factors and/or age. Screening tests ordered at today's visit but not completed yet may show as past due. Also, please note that scanned in results may not display below.  Preventive Screenings:  Service Recommendations Previous Testing/Comments   Colorectal Cancer Screening  Colonoscopy    Fecal Occult Blood Test (FOBT)/Fecal Immunochemical Test (FIT)  Fecal DNA/Cologuard Test  Flexible Sigmoidoscopy Age: 45-75 years old   Colonoscopy: every 10 years (May be performed more frequently if at higher risk)  OR  FOBT/FIT: every 1 year  OR  Cologuard: every 3 years  OR  Sigmoidoscopy: every 5 years  Screening may be recommended earlier than age 45 if at higher risk for colorectal cancer. Also, an individualized decision between you and your healthcare provider will decide whether screening between the ages of 76-85 would be appropriate. Colonoscopy: 01/15/2024  FOBT/FIT: Not on file  Cologuard: Not on file  Sigmoidoscopy: Not on file    Screening Current     Prostate Cancer Screening Individualized decision between patient and health care provider in men between ages of 55-69   Medicare will cover every 12 months beginning on the day after your 50th birthday PSA: 2.34 ng/mL           Hepatitis C Screening Once for adults born between 1945 and 1965  More frequently in patients at high risk for Hepatitis C Hep C Antibody: Not on file        Diabetes Screening 1-2 times per year if you're at risk for diabetes or have pre-diabetes Fasting glucose: 157 mg/dL (8/15/2024)  A1C: 6.2 % (8/15/2024)  Screening Not Indicated  History Diabetes   Cholesterol Screening Once every 5 years  if you don't have a lipid disorder. May order more often based on risk factors. Lipid panel: 08/15/2024  Screening Not Indicated  History Lipid Disorder      Other Preventive Screenings Covered by Medicare:  Abdominal Aortic Aneurysm (AAA) Screening: covered once if your at risk. You're considered to be at risk if you have a family history of AAA or a male between the age of 65-75 who smoking at least 100 cigarettes in your lifetime.  Lung Cancer Screening: covers low dose CT scan once per year if you meet all of the following conditions: (1) Age 55-77; (2) No signs or symptoms of lung cancer; (3) Current smoker or have quit smoking within the last 15 years; (4) You have a tobacco smoking history of at least 20 pack years (packs per day x number of years you smoked); (5) You get a written order from a healthcare provider.  Glaucoma Screening: covered annually if you're considered high risk: (1) You have diabetes OR (2) Family history of glaucoma OR (3)  aged 50 and older OR (4)  American aged 65 and older  Osteoporosis Screening: covered every 2 years if you meet one of the following conditions: (1) Have a vertebral abnormality; (2) On glucocorticoid therapy for more than 3 months; (3) Have primary hyperparathyroidism; (4) On osteoporosis medications and need to assess response to drug therapy.  HIV Screening: covered annually if you're between the age of 15-65. Also covered annually if you are younger than 15 and older than 65 with risk factors for HIV infection. For pregnant patients, it is covered up to 3 times per pregnancy.    Immunizations:  Immunization Recommendations   Influenza Vaccine Annual influenza vaccination during flu season is recommended for all persons aged >= 6 months who do not have contraindications   Pneumococcal Vaccine   * Pneumococcal conjugate vaccine = PCV13 (Prevnar 13), PCV15 (Vaxneuvance), PCV20 (Prevnar 20)  * Pneumococcal polysaccharide vaccine = PPSV23  (Pneumovax) Adults 19-65 yo with certain risk factors or if 65+ yo  If never received any pneumonia vaccine: recommend Prevnar 20 (PCV20)  Give PCV20 if previously received 1 dose of PCV13 or PPSV23   Hepatitis B Vaccine 3 dose series if at intermediate or high risk (ex: diabetes, end stage renal disease, liver disease)   Respiratory syncytial virus (RSV) Vaccine - COVERED BY MEDICARE PART D  * RSVPreF3 (Arexvy) CDC recommends that adults 60 years of age and older may receive a single dose of RSV vaccine using shared clinical decision-making (SCDM)   Tetanus (Td) Vaccine - COST NOT COVERED BY MEDICARE PART B Following completion of primary series, a booster dose should be given every 10 years to maintain immunity against tetanus. Td may also be given as tetanus wound prophylaxis.   Tdap Vaccine - COST NOT COVERED BY MEDICARE PART B Recommended at least once for all adults. For pregnant patients, recommended with each pregnancy.   Shingles Vaccine (Shingrix) - COST NOT COVERED BY MEDICARE PART B  2 shot series recommended in those 19 years and older who have or will have weakened immune systems or those 50 years and older     Health Maintenance Due:      Topic Date Due   • Hepatitis C Screening  Never done   • Lung Cancer Screening  02/10/2025   • Colorectal Cancer Screening  01/14/2027     Immunizations Due:      Topic Date Due   • Hepatitis A Vaccine (1 of 2 - Risk 2-dose series) Never done   • Pneumococcal Vaccine: 65+ Years (2 of 2 - PCV) 12/08/2020   • Influenza Vaccine (1) 09/01/2024   • COVID-19 Vaccine (5 - 2023-24 season) 09/01/2024     Advance Directives   What are advance directives?  Advance directives are legal documents that state your wishes and plans for medical care. These plans are made ahead of time in case you lose your ability to make decisions for yourself. Advance directives can apply to any medical decision, such as the treatments you want, and if you want to donate organs.   What are the  types of advance directives?  There are many types of advance directives, and each state has rules about how to use them. You may choose a combination of any of the following:  Living will:  This is a written record of the treatment you want. You can also choose which treatments you do not want, which to limit, and which to stop at a certain time. This includes surgery, medicine, IV fluid, and tube feedings.   Durable power of  for healthcare (DPAHC):  This is a written record that states who you want to make healthcare choices for you when you are unable to make them for yourself. This person, called a proxy, is usually a family member or a friend. You may choose more than 1 proxy.  Do not resuscitate (DNR) order:  A DNR order is used in case your heart stops beating or you stop breathing. It is a request not to have certain forms of treatment, such as CPR. A DNR order may be included in other types of advance directives.  Medical directive:  This covers the care that you want if you are in a coma, near death, or unable to make decisions for yourself. You can list the treatments you want for each condition. Treatment may include pain medicine, surgery, blood transfusions, dialysis, IV or tube feedings, and a ventilator (breathing machine).  Values history:  This document has questions about your views, beliefs, and how you feel and think about life. This information can help others choose the care that you would choose.  Why are advance directives important?  An advance directive helps you control your care. Although spoken wishes may be used, it is better to have your wishes written down. Spoken wishes can be misunderstood, or not followed. Treatments may be given even if you do not want them. An advance directive may make it easier for your family to make difficult choices about your care.   Cigarette Smoking and Your Health   Risks to your health if you smoke:  Nicotine and other chemicals found in  tobacco damage every cell in your body. Even if you are a light smoker, you have an increased risk for cancer, heart disease, and lung disease. If you are pregnant or have diabetes, smoking increases your risk for complications.   Benefits to your health if you stop smoking:   You decrease respiratory symptoms such as coughing, wheezing, and shortness of breath.   You reduce your risk for cancers of the lung, mouth, throat, kidney, bladder, pancreas, stomach, and cervix. If you already have cancer, you increase the benefits of chemotherapy. You also reduce your risk for cancer returning or a second cancer from developing.   You reduce your risk for heart disease, blood clots, heart attack, and stroke.   You reduce your risk for lung infections, and diseases such as pneumonia, asthma, chronic bronchitis, and emphysema.  Your circulation improves. More oxygen can be delivered to your body. If you have diabetes, you lower your risk for complications, such as kidney, artery, and eye diseases. You also lower your risk for nerve damage. Nerve damage can lead to amputations, poor vision, and blindness.  You improve your body's ability to heal and to fight infections.  For more information and support to stop smoking:   [employer]  Phone: 2- 514 - [phone]  Web Address: www.[employer]  Weight Management   Why it is important to manage your weight:  Being overweight increases your risk of health conditions such as heart disease, high blood pressure, type 2 diabetes, and certain types of cancer. It can also increase your risk for osteoarthritis, sleep apnea, and other respiratory problems. Aim for a slow, steady weight loss. Even a small amount of weight loss can lower your risk of health problems.  How to lose weight safely:  A safe and healthy way to lose weight is to eat fewer calories and get regular exercise. You can lose up about 1 pound a week by decreasing the number of calories you eat by 500 calories each day.    Healthy meal plan for weight management:  A healthy meal plan includes a variety of foods, contains fewer calories, and helps you stay healthy. A healthy meal plan includes the following:  Eat whole-grain foods more often.  A healthy meal plan should contain fiber. Fiber is the part of grains, fruits, and vegetables that is not broken down by your body. Whole-grain foods are healthy and provide extra fiber in your diet. Some examples of whole-grain foods are whole-wheat breads and pastas, oatmeal, brown rice, and bulgur.  Eat a variety of vegetables every day.  Include dark, leafy greens such as spinach, kale, randee greens, and mustard greens. Eat yellow and orange vegetables such as carrots, sweet potatoes, and winter squash.   Eat a variety of fruits every day.  Choose fresh or canned fruit (canned in its own juice or light syrup) instead of juice. Fruit juice has very little or no fiber.  Eat low-fat dairy foods.  Drink fat-free (skim) milk or 1% milk. Eat fat-free yogurt and low-fat cottage cheese. Try low-fat cheeses such as mozzarella and other reduced-fat cheeses.  Choose meat and other protein foods that are low in fat.  Choose beans or other legumes such as split peas or lentils. Choose fish, skinless poultry (chicken or turkey), or lean cuts of red meat (beef or pork). Before you cook meat or poultry, cut off any visible fat.   Use less fat and oil.  Try baking foods instead of frying them. Add less fat, such as margarine, sour cream, regular salad dressing and mayonnaise to foods. Eat fewer high-fat foods. Some examples of high-fat foods include french fries, doughnuts, ice cream, and cakes.  Eat fewer sweets.  Limit foods and drinks that are high in sugar. This includes candy, cookies, regular soda, and sweetened drinks.  Exercise:  Exercise at least 30 minutes per day on most days of the week. Some examples of exercise include walking, biking, dancing, and swimming. You can also fit in more physical  "activity by taking the stairs instead of the elevator or parking farther away from stores. Ask your healthcare provider about the best exercise plan for you.   Alcohol Use and Your Health    Drinking too much can harm your health.  Excessive alcohol use leads to about 88,000 death in the [place] each year, and shortens the life of those who diet by almost 30 years.  Further, excessive drinking cost the economy $249 billion in 2010.  Most excessive drinkers are not alcohol dependent.    Excessive alcohol use has immediate effects that increase the risk of many harmful health conditions.  These are most often the result of binge drinking.  Over time, excessive alcohol use can lead to the development of chronic diseases and other series health problems.    What is considered a \"drink\"?        Excessive alcohol use includes:  Binge Drinking: For women, 4 or more drinks consumed on one occasion. For men, 5 or more drinks consumed on one occasion.  Heavy Drinking: For women, 8 or more drinks per week. For men, 15 or more drinks per week  Any alcohol used by pregnant women  Any alcohol used by those under the age of 21 years    If you choose to drink, do so in moderation:  Do not drink at all if you are under the age of 21, or if you are or may be pregnant, or have health problems that could be made worse by drinking.  For women, up to 1 drink per day  For men, up to 2 drinks a day    No one should begin drinking or drink more frequently based on potential health benefits    Short-Term Health Risks:  Injuries: motor vehicle crashes, falls, drownings, burns  Violence: homicide, suicide, sexual assault, intimate partner violence  Alcohol poisoning  Reproductive health: risky sexual behaviors, unintended prengnacy, sexually transmitted diseases, miscarriage, stillbirth, fetal alcohol syndrome    Long-Term Health Risks:  Chronic diseases: high blood pressure, heart disease, stroke, liver disease, digestive " problems  Cancers: breast, mouth and throat, liver, colon  Learning and memory problems: dementia, poor school performance  Mental health: depression, anxiety, insomnia  Social problems: lost productivity, family problems, unemployment  Alcohol dependence    For support and more information:  Substance Abuse and Mental Health Services Administration  [address]  [place] [address]  Web Address: http://www.[facility].gov    Alcoholics Anonymous        Web Address: http://www.aa.org    https://www.cdc.gov/alcohol/fact-sheets/alcohol-use.htm     © Copyright [employer] 2018 Information is for End User's use only and may not be sold, redistributed or otherwise used for commercial purposes. All illustrations and images included in CareNotes® are the copyrighted property of [employer] Inc[employer] or [employer]

## 2024-10-02 NOTE — ASSESSMENT & PLAN NOTE
Medicare annual wellness visit completed, patient up-to-date on all screenings including colon cancer screening, lung cancer screening.  However he is due for PSA screening which I have placed an order for.  COVID and flu vaccine both administered today.  Recommend shingles vaccine and updated Tdap at local pharmacy.

## 2024-10-02 NOTE — ASSESSMENT & PLAN NOTE
Discussed plan for quitting, patient not ready to quit at this time.  Does report he is still smoking about half a pack a day.

## 2024-10-21 ENCOUNTER — OFFICE VISIT (OUTPATIENT)
Dept: URGENT CARE | Facility: CLINIC | Age: 69
End: 2024-10-21
Payer: MEDICARE

## 2024-10-21 VITALS
TEMPERATURE: 96.3 F | HEART RATE: 64 BPM | OXYGEN SATURATION: 99 % | DIASTOLIC BLOOD PRESSURE: 78 MMHG | SYSTOLIC BLOOD PRESSURE: 120 MMHG | RESPIRATION RATE: 14 BRPM

## 2024-10-21 DIAGNOSIS — J01.00 ACUTE NON-RECURRENT MAXILLARY SINUSITIS: Primary | ICD-10-CM

## 2024-10-21 PROCEDURE — 99214 OFFICE O/P EST MOD 30 MIN: CPT | Performed by: PHYSICIAN ASSISTANT

## 2024-10-21 PROCEDURE — G0463 HOSPITAL OUTPT CLINIC VISIT: HCPCS | Performed by: PHYSICIAN ASSISTANT

## 2024-10-21 RX ORDER — DEXTROMETHORPHAN HYDROBROMIDE AND PROMETHAZINE HYDROCHLORIDE 15; 6.25 MG/5ML; MG/5ML
5 SYRUP ORAL 4 TIMES DAILY PRN
Qty: 118 ML | Refills: 0 | Status: SHIPPED | OUTPATIENT
Start: 2024-10-21

## 2024-10-21 RX ORDER — METHYLPREDNISOLONE 4 MG/1
TABLET ORAL
Qty: 21 TABLET | Refills: 0 | Status: SHIPPED | OUTPATIENT
Start: 2024-10-21 | End: 2024-10-21 | Stop reason: CLARIF

## 2024-10-21 NOTE — PROGRESS NOTES
[facility]        NAME: Steve Douglass is a 68 y.o. male  : 1955    MRN: [id number]  DATE: 2024  TIME: 9:27 AM      Assessment and Plan     Acute non-recurrent maxillary sinusitis [J01.00]  1. Acute non-recurrent maxillary sinusitis  amoxicillin-clavulanate (AUGMENTIN) 875-125 mg per tablet    promethazine-dextromethorphan (PHENERGAN-DM) 6.25-15 mg/5 mL oral syrup    DISCONTINUED: methylPREDNISolone 4 MG tablet therapy pack        Note:   Rx antibiotics due to duration of symptoms and cough medicine for symptoms   Was going to use steroids for inflammation, but patient didn't want this due to diabetes/blood sugar issues     Patient Instructions   There are no Patient Instructions on file for this visit.     Follow up with primary care provider.   Go to ER if symptoms worsen.    Chief Complaint     Chief Complaint   Patient presents with    Cold Like Symptoms     Head and chest congestion with cough, for over 1 week         History of Present Illness     Patient presents with cough, nasal congestion and sinus pressure x over 1 week. Patient reports he has been taking Mucinex and OTC cough suppressants with no relief. He reports he is able to cough up clear mucus on occasion with the congestion being worse in the morning and at night. Patient reports the coughing keeps him up at night even with the OTC medications. He reports he got his Covid and Flu shots about 3 weeks ago. PT denies fever, N,V,D.          Review of Systems     Review of Systems   Constitutional:  Negative for chills, fatigue and fever.   HENT:  Positive for congestion and sinus pressure. Negative for ear pain, postnasal drip, rhinorrhea, sinus pain, sneezing and sore throat.    Eyes:  Negative for pain and visual disturbance.   Respiratory:  Positive for cough. Negative for shortness of breath.    Cardiovascular:  Negative for chest pain and palpitations.   Gastrointestinal:  Negative for abdominal pain, diarrhea,  nausea and vomiting.   Genitourinary:  Negative for dysuria and hematuria.   Musculoskeletal:  Negative for arthralgias, back pain and myalgias.   Skin:  Negative for rash.   Neurological:  Negative for dizziness, seizures, syncope, numbness and headaches.   All other systems reviewed and are negative.        Current Medications       Current Outpatient Medications:     amLODIPine (NORVASC) 10 mg tablet, TAKE ONE TABLET BY MOUTH EVERY DAY, Disp: 90 tablet, Rfl: 1    amoxicillin-clavulanate (AUGMENTIN) 875-125 mg per tablet, Take 1 tablet by mouth every 12 (twelve) hours for 7 days, Disp: 14 tablet, Rfl: 0    atorvastatin (LIPITOR) 40 mg tablet, Take 1 tablet (40 mg total) by mouth daily, Disp: 90 tablet, Rfl: 1    busPIRone (BUSPAR) 10 mg tablet, Take 1 tablet (10 mg total) by mouth 2 (two) times a day, Disp: 200 tablet, Rfl: 1    carvedilol (COREG) 12.5 mg tablet, Take 2 tablets (25 mg total) by mouth 2 (two) times a day with meals, Disp: 360 tablet, Rfl: 1    chlorthalidone 25 mg tablet, TAKE ONE TABLET BY MOUTH EVERY DAY, Disp: 90 tablet, Rfl: 1    glimepiride (AMARYL) 2 mg tablet, TAKE ONE TABLET BY MOUTH EVERY DAY WITH BREAKFAST., Disp: 90 tablet, Rfl: 1    lisinopril (ZESTRIL) 40 mg tablet, TAKE ONE TABLET BY MOUTH EVERY DAY., Disp: 30 tablet, Rfl: 5    metFORMIN (GLUCOPHAGE) 500 mg tablet, Take 1 tablet (500 mg total) by mouth 2 (two) times a day with meals, Disp: 60 tablet, Rfl: 0    Multiple Vitamins-Minerals (MULTIVITAMIN WITH MINERALS) tablet, Take 1 tablet by mouth daily, Disp: , Rfl:     promethazine-dextromethorphan (PHENERGAN-DM) 6.25-15 mg/5 mL oral syrup, Take 5 mL by mouth 4 (four) times a day as needed for cough, Disp: 118 mL, Rfl: 0    aspirin 81 mg chewable tablet, Chew 1 tablet (81 mg total) daily, Disp: 90 tablet, Rfl: 1    buPROPion (WELLBUTRIN SR) 150 mg 12 hr tablet, TAKE ONE TABLET BY MOUTH TWICE A DAY (Patient not taking: Reported on 10/21/2024), Disp: 180 tablet, Rfl: 1    Current  Allergies     Allergies as of 10/21/2024    (No Known Allergies)              The following portions of the patient's history were reviewed and updated as appropriate: allergies, current medications, past family history, past medical history, past social history, past surgical history, and problem list.     Past Medical History:   Diagnosis Date    Anxiety     Arthritis     Colon polyp     Diabetes mellitus (HCC)     Encounter to establish care 04/20/2021    Hyperlipidemia     Hyperosmolar non-ketotic state in patient with type 2 diabetes mellitus ([facility]) 12/05/2019    Hypertension     Type 2 diabetes mellitus with hyperglycemia, with long-term current use of insulin ([facility]) 05/08/2020       Past Surgical History:   Procedure Laterality Date    CARDIAC CATHETERIZATION N/A 11/11/2021    Procedure: Cardiac catheterization;  Surgeon: Mj Wakefield MD;  Location: [facility];  Service: Cardiology    CARDIAC CATHETERIZATION Left 11/11/2021    Procedure: Cardiac Coronary Angiogram;  Surgeon: Mj Wakefield MD;  Location: [facility];  Service: Cardiology    COLONOSCOPY  2010    KNEE SURGERY Right        Family History   Problem Relation Age of Onset    Diabetes Mother     Hyperlipidemia Mother     Dementia Mother     Hyperlipidemia Father          Medications have been verified.        Objective     /78   Pulse 64   Temp (!) 96.3 °F (35.7 °C)   Resp 14   SpO2 99%   No LMP for male patient.         Physical Exam     Physical Exam  Vitals and nursing note reviewed.   Constitutional:       Appearance: Normal appearance. He is normal weight.   HENT:      Head: Normocephalic and atraumatic.      Right Ear: Tympanic membrane, ear canal and external ear normal.      Left Ear: Tympanic membrane, ear canal and external ear normal.      Nose: Congestion present.      Right Sinus: Maxillary sinus tenderness present.      Left Sinus: Maxillary sinus tenderness present.      Mouth/Throat:      Mouth:  Mucous membranes are moist.      Pharynx: Posterior oropharyngeal erythema (mild cobblestoning) present.   Eyes:      Extraocular Movements: Extraocular movements intact.      Conjunctiva/sclera: Conjunctivae normal.      Pupils: Pupils are equal, round, and reactive to light.   Cardiovascular:      Rate and Rhythm: Normal rate and regular rhythm.      Pulses: Normal pulses.      Heart sounds: Normal heart sounds.   Pulmonary:      Effort: Pulmonary effort is normal.      Breath sounds: Normal breath sounds.   Musculoskeletal:         General: Normal range of motion.      Cervical back: Normal range of motion and neck supple.   Skin:     General: Skin is warm and dry.   Neurological:      General: No focal deficit present.      Mental Status: He is alert and oriented to person, place, and time.   Psychiatric:         Mood and Affect: Mood normal.         Behavior: Behavior normal.

## 2024-11-01 PROBLEM — Z00.00 MEDICARE ANNUAL WELLNESS VISIT, SUBSEQUENT: Status: RESOLVED | Noted: 2021-04-20 | Resolved: 2024-11-01

## 2024-11-11 DIAGNOSIS — I10 ESSENTIAL HYPERTENSION: ICD-10-CM

## 2024-11-12 DIAGNOSIS — I10 PRIMARY HYPERTENSION: ICD-10-CM

## 2024-11-12 RX ORDER — LISINOPRIL 40 MG/1
TABLET ORAL
Qty: 30 TABLET | Refills: 5 | Status: SHIPPED | OUTPATIENT
Start: 2024-11-12

## 2024-11-12 NOTE — TELEPHONE ENCOUNTER
Reason for call:   [x] Refill   [] Prior Auth  [] Other:     Office:   [] PCP/Provider -   [x] Specialty/Provider - cards    Medication: carvedilol (COREG) 12.5 mg tablet     Dose/Frequency:  Take 2 tablets (25 mg total) by mouth 2 (two) times a day with meals     Quantity: : 360 tablet     Pharmacy: [id number] - [place] - [id number]    Does the patient have enough for 3 days?   [x] Yes   [] No - Send as HP to POD

## 2024-11-13 RX ORDER — CARVEDILOL 12.5 MG/1
25 TABLET ORAL 2 TIMES DAILY WITH MEALS
Qty: 360 TABLET | Refills: 1 | Status: SHIPPED | OUTPATIENT
Start: 2024-11-13

## 2024-11-25 DIAGNOSIS — E11.9 DIABETES MELLITUS TYPE 2 IN NONOBESE (HCC): ICD-10-CM

## 2024-11-26 ENCOUNTER — TELEPHONE (OUTPATIENT)
Dept: FAMILY MEDICINE CLINIC | Facility: CLINIC | Age: 69
End: 2024-11-26

## 2024-11-26 NOTE — TELEPHONE ENCOUNTER
Patient called to request a refill for their   metFORMIN (GLUCOPHAGE) 500 mg tablet  advised a refill was requested on 11/26 and is pending approval. Patient verbalized understanding and is in agreement.

## 2024-12-03 ENCOUNTER — RA CDI HCC (OUTPATIENT)
Dept: OTHER | Facility: HOSPITAL | Age: 69
End: 2024-12-03

## 2024-12-06 ENCOUNTER — APPOINTMENT (OUTPATIENT)
Dept: LAB | Facility: CLINIC | Age: 69
End: 2024-12-06
Payer: MEDICARE

## 2024-12-06 DIAGNOSIS — Z12.5 PROSTATE CANCER SCREENING: ICD-10-CM

## 2024-12-06 DIAGNOSIS — E11.9 DIABETES MELLITUS TYPE 2 IN NONOBESE (HCC): ICD-10-CM

## 2024-12-06 LAB
ALBUMIN SERPL BCG-MCNC: 4.4 G/DL (ref 3.5–5)
ALP SERPL-CCNC: 46 U/L (ref 34–104)
ALT SERPL W P-5'-P-CCNC: 24 U/L (ref 7–52)
ANION GAP SERPL CALCULATED.3IONS-SCNC: 6 MMOL/L (ref 4–13)
AST SERPL W P-5'-P-CCNC: 18 U/L (ref 13–39)
BILIRUB SERPL-MCNC: 0.27 MG/DL (ref 0.2–1)
BUN SERPL-MCNC: 26 MG/DL (ref 5–25)
CALCIUM SERPL-MCNC: 9.8 MG/DL (ref 8.4–10.2)
CHLORIDE SERPL-SCNC: 103 MMOL/L (ref 96–108)
CHOLEST SERPL-MCNC: 161 MG/DL (ref ?–200)
CO2 SERPL-SCNC: 30 MMOL/L (ref 21–32)
CREAT SERPL-MCNC: 1.1 MG/DL (ref 0.6–1.3)
CREAT UR-MCNC: 133.4 MG/DL
ERYTHROCYTE [DISTWIDTH] IN BLOOD BY AUTOMATED COUNT: 13 % (ref 11.6–15.1)
GFR SERPL CREATININE-BSD FRML MDRD: 68 ML/MIN/1.73SQ M
GLUCOSE P FAST SERPL-MCNC: 138 MG/DL (ref 65–99)
HCT VFR BLD AUTO: 43.3 % (ref 36.5–49.3)
HDLC SERPL-MCNC: 40 MG/DL
HGB BLD-MCNC: 13.8 G/DL (ref 12–17)
LDLC SERPL CALC-MCNC: 78 MG/DL (ref 0–100)
MCH RBC QN AUTO: 30.9 PG (ref 26.8–34.3)
MCHC RBC AUTO-ENTMCNC: 31.9 G/DL (ref 31.4–37.4)
MCV RBC AUTO: 97 FL (ref 82–98)
MICROALBUMIN UR-MCNC: 111.7 MG/L
MICROALBUMIN/CREAT 24H UR: 84 MG/G CREATININE (ref 0–30)
PLATELET # BLD AUTO: 203 THOUSANDS/UL (ref 149–390)
PMV BLD AUTO: 12.3 FL (ref 8.9–12.7)
POTASSIUM SERPL-SCNC: 4.4 MMOL/L (ref 3.5–5.3)
PROT SERPL-MCNC: 7.4 G/DL (ref 6.4–8.4)
PSA SERPL-MCNC: 1.83 NG/ML (ref 0–4)
RBC # BLD AUTO: 4.46 MILLION/UL (ref 3.88–5.62)
SODIUM SERPL-SCNC: 139 MMOL/L (ref 135–147)
TRIGL SERPL-MCNC: 217 MG/DL (ref ?–150)
WBC # BLD AUTO: 11 THOUSAND/UL (ref 4.31–10.16)

## 2024-12-06 PROCEDURE — 80053 COMPREHEN METABOLIC PANEL: CPT

## 2024-12-06 PROCEDURE — 36415 COLL VENOUS BLD VENIPUNCTURE: CPT

## 2024-12-06 PROCEDURE — 80061 LIPID PANEL: CPT

## 2024-12-06 PROCEDURE — 82043 UR ALBUMIN QUANTITATIVE: CPT

## 2024-12-06 PROCEDURE — 85027 COMPLETE CBC AUTOMATED: CPT

## 2024-12-06 PROCEDURE — G0103 PSA SCREENING: HCPCS

## 2024-12-06 PROCEDURE — 82570 ASSAY OF URINE CREATININE: CPT

## 2024-12-06 PROCEDURE — 83036 HEMOGLOBIN GLYCOSYLATED A1C: CPT

## 2024-12-07 LAB
EST. AVERAGE GLUCOSE BLD GHB EST-MCNC: 137 MG/DL
HBA1C MFR BLD: 6.4 %

## 2024-12-09 ENCOUNTER — APPOINTMENT (OUTPATIENT)
Dept: RADIOLOGY | Facility: CLINIC | Age: 69
End: 2024-12-09
Payer: MEDICARE

## 2024-12-09 ENCOUNTER — RESULTS FOLLOW-UP (OUTPATIENT)
Dept: FAMILY MEDICINE CLINIC | Facility: CLINIC | Age: 69
End: 2024-12-09

## 2024-12-09 ENCOUNTER — OFFICE VISIT (OUTPATIENT)
Dept: FAMILY MEDICINE CLINIC | Facility: CLINIC | Age: 69
End: 2024-12-09
Payer: MEDICARE

## 2024-12-09 VITALS
BODY MASS INDEX: 27.8 KG/M2 | SYSTOLIC BLOOD PRESSURE: 110 MMHG | OXYGEN SATURATION: 97 % | HEIGHT: 66 IN | WEIGHT: 173 LBS | TEMPERATURE: 98 F | HEART RATE: 69 BPM | DIASTOLIC BLOOD PRESSURE: 60 MMHG

## 2024-12-09 DIAGNOSIS — E11.9 DIABETES MELLITUS TYPE 2 IN NONOBESE (HCC): ICD-10-CM

## 2024-12-09 DIAGNOSIS — R05.3 CHRONIC COUGH: Primary | ICD-10-CM

## 2024-12-09 DIAGNOSIS — F17.210 SMOKING GREATER THAN 20 PACK YEARS: ICD-10-CM

## 2024-12-09 DIAGNOSIS — R05.3 CHRONIC COUGH: ICD-10-CM

## 2024-12-09 PROCEDURE — 99214 OFFICE O/P EST MOD 30 MIN: CPT | Performed by: STUDENT IN AN ORGANIZED HEALTH CARE EDUCATION/TRAINING PROGRAM

## 2024-12-09 PROCEDURE — 71046 X-RAY EXAM CHEST 2 VIEWS: CPT

## 2024-12-09 PROCEDURE — G2211 COMPLEX E/M VISIT ADD ON: HCPCS | Performed by: STUDENT IN AN ORGANIZED HEALTH CARE EDUCATION/TRAINING PROGRAM

## 2024-12-09 NOTE — ASSESSMENT & PLAN NOTE
A1c slightly increased from 6.2-6.4.  Patient does endorse poor eating over the last few weeks.  He will continue glimepiride 2 mg with breakfast, metformin 500 mg twice daily.  Dietary counseling provided.  Patient to return in 3 months for follow-up, labs have been ordered to be completed prior to next visit.  Lab Results   Component Value Date    HGBA1C 6.4 (H) 12/06/2024

## 2024-12-09 NOTE — ASSESSMENT & PLAN NOTE
Patient to be due for lung cancer screening in February 2025, order has been placed patient will call to schedule.  Orders:    CT lung screening program; Future

## 2024-12-09 NOTE — ASSESSMENT & PLAN NOTE
Start pseudoephedrine daily x 10 days.  Orders:    XR chest pa and lateral; Future    pseudoephedrine (SUDAFED) 60 mg tablet; Take 1 tablet (60 mg total) by mouth every 6 (six) hours as needed for congestion

## 2024-12-09 NOTE — PROGRESS NOTES
Name: Steve Douglass      : 1955      MRN: [id number]  Encounter Provider: Ally Diaz DO  Encounter Date: 2024   Encounter department: [facility]  :  Assessment & Plan  Smoking greater than 20 pack years  Patient to be due for lung cancer screening in 2025, order has been placed patient will call to schedule.  Orders:    CT lung screening program; Future    Chronic cough  Start pseudoephedrine daily x 10 days.  Orders:    XR chest pa and lateral; Future    pseudoephedrine (SUDAFED) 60 mg tablet; Take 1 tablet (60 mg total) by mouth every 6 (six) hours as needed for congestion    Diabetes mellitus type 2 in nonobese (HCC)  A1c slightly increased from 6.2-6.4.  Patient does endorse poor eating over the last few weeks.  He will continue glimepiride 2 mg with breakfast, metformin 500 mg twice daily.  Dietary counseling provided.  Patient to return in 3 months for follow-up, labs have been ordered to be completed prior to next visit.  Lab Results   Component Value Date    HGBA1C 6.4 (H) 2024                  Lung Cancer Screening Shared Decision Making: I discussed with him that he is a candidate for lung cancer CT screening.     The following Shared Decision-Making points were covered:  Benefits of screening were discussed, including the rates of reduction in death from lung cancer and other causes.  Harms of screening were reviewed, including false positive tests, radiation exposure levels, risks of invasive procedures, risks of complications of screening, and risk of overdiagnosis.  I counseled on the importance of adherence to annual lung cancer LDCT screening, impact of co-morbidities, and ability or willingness to undergo diagnosis and treatment.  I counseled on the importance of maintaining abstinence as a former smoker or was counseled on the importance of smoking cessation if a current smoker    Review of Eligibility Criteria: He meets all of  "the criteria for Lung Cancer Screening.   - He is 69 y.o.   - He has 20 pack year tobacco history and is a current smoker or has quit within the past 15 years  - He presents no signs or symptoms of lung cancer    After discussion, the patient decided to elect lung cancer screening.      History of Present Illness     Diabetes  He presents for his follow-up diabetic visit. He has type 2 diabetes mellitus. His disease course has been worsening. There are no hypoglycemic associated symptoms. Pertinent negatives for hypoglycemia include no seizures. There are no diabetic associated symptoms. Pertinent negatives for diabetes include no chest pain. There are no hypoglycemic complications. Symptoms are stable. Diabetic complications include heart disease. Risk factors for coronary artery disease include dyslipidemia and male sex. Current diabetic treatment includes oral agent (dual therapy). He is compliant with treatment all of the time.     Review of Systems   Constitutional:  Negative for chills and fever.   HENT:  Positive for rhinorrhea. Negative for ear pain and sore throat.    Eyes:  Negative for pain and visual disturbance.   Respiratory:  Positive for cough. Negative for shortness of breath.    Cardiovascular:  Negative for chest pain and palpitations.   Gastrointestinal:  Negative for abdominal pain and vomiting.   Genitourinary:  Negative for dysuria and hematuria.   Musculoskeletal:  Negative for arthralgias and back pain.   Skin:  Negative for color change and rash.   Neurological:  Negative for seizures and syncope.   All other systems reviewed and are negative.         Objective   /60 (BP Location: Left arm, Patient Position: Sitting, Cuff Size: Large)   Pulse 69   Temp 98 °F (36.7 °C) (Temporal)   Ht 5' 6\" (1.676 m)   Wt 78.5 kg (173 lb)   SpO2 97%   BMI 27.92 kg/m²      Physical Exam  Vitals reviewed.   Constitutional:       General: He is not in acute distress.     Appearance: Normal " appearance. He is well-developed.   HENT:      Head: Normocephalic and atraumatic.      Nose: Rhinorrhea present.      Mouth/Throat:      Pharynx: No oropharyngeal exudate or posterior oropharyngeal erythema.   Eyes:      Extraocular Movements: Extraocular movements intact.      Conjunctiva/sclera: Conjunctivae normal.   Cardiovascular:      Rate and Rhythm: Normal rate and regular rhythm.      Heart sounds: Normal heart sounds. No murmur heard.  Pulmonary:      Effort: Pulmonary effort is normal. No respiratory distress.      Breath sounds: Normal breath sounds. No wheezing or rales.   Skin:     General: Skin is warm and dry.   Neurological:      Mental Status: He is alert.   Psychiatric:         Mood and Affect: Mood normal.         Behavior: Behavior normal.

## 2024-12-26 DIAGNOSIS — E11.00 HYPEROSMOLAR NON-KETOTIC STATE IN PATIENT WITH TYPE 2 DIABETES MELLITUS (HCC): ICD-10-CM

## 2024-12-26 DIAGNOSIS — I10 ESSENTIAL HYPERTENSION: ICD-10-CM

## 2024-12-26 RX ORDER — GLIMEPIRIDE 2 MG/1
2 TABLET ORAL
Qty: 90 TABLET | Refills: 1 | Status: SHIPPED | OUTPATIENT
Start: 2024-12-26

## 2024-12-26 RX ORDER — AMLODIPINE BESYLATE 10 MG/1
TABLET ORAL
Qty: 90 TABLET | Refills: 1 | Status: SHIPPED | OUTPATIENT
Start: 2024-12-26

## 2025-01-21 DIAGNOSIS — I1A.0 RESISTANT HYPERTENSION: ICD-10-CM

## 2025-01-21 RX ORDER — CHLORTHALIDONE 25 MG/1
25 TABLET ORAL DAILY
Qty: 90 TABLET | Refills: 1 | Status: SHIPPED | OUTPATIENT
Start: 2025-01-21

## 2025-02-10 DIAGNOSIS — I10 PRIMARY HYPERTENSION: ICD-10-CM

## 2025-02-10 RX ORDER — CARVEDILOL 12.5 MG/1
25 TABLET ORAL 2 TIMES DAILY WITH MEALS
Qty: 360 TABLET | Refills: 0 | Status: CANCELLED | OUTPATIENT
Start: 2025-02-10

## 2025-02-10 NOTE — TELEPHONE ENCOUNTER
Pharmacy states there are no refills on file.     Reason for call:   [x] Refill   [] Prior Auth  [] Other:     Office:   [] PCP/Provider -   [x] Specialty/Provider - [facility]  Authorized By: Castillo Lopez MD      Medication: carvedilol (COREG) 12.5 mg tablet    Dose/Frequency: Take 2 tablets (25 mg total) by mouth 2 (two) times a day with meals    Quantity: 360 tablet    Pharmacy: [id number] [address]     Does the patient have enough for 3 days?   [x] Yes   [] No - Send as HP to POD     None

## 2025-02-11 DIAGNOSIS — I10 PRIMARY HYPERTENSION: ICD-10-CM

## 2025-02-11 RX ORDER — CARVEDILOL 12.5 MG/1
25 TABLET ORAL 2 TIMES DAILY WITH MEALS
Qty: 400 TABLET | Refills: 1 | Status: SHIPPED | OUTPATIENT
Start: 2025-02-11

## 2025-02-19 DIAGNOSIS — F41.1 GENERALIZED ANXIETY DISORDER: ICD-10-CM

## 2025-02-20 RX ORDER — BUSPIRONE HYDROCHLORIDE 10 MG/1
10 TABLET ORAL 2 TIMES DAILY
Qty: 200 TABLET | Refills: 1 | Status: SHIPPED | OUTPATIENT
Start: 2025-02-20

## 2025-03-14 DIAGNOSIS — E78.5 DYSLIPIDEMIA: ICD-10-CM

## 2025-03-14 RX ORDER — ATORVASTATIN CALCIUM 40 MG/1
40 TABLET, FILM COATED ORAL DAILY
Qty: 90 TABLET | Refills: 1 | Status: SHIPPED | OUTPATIENT
Start: 2025-03-14

## 2025-05-14 DIAGNOSIS — E11.9 DIABETES MELLITUS TYPE 2 IN NONOBESE (HCC): ICD-10-CM

## 2025-05-14 DIAGNOSIS — I10 ESSENTIAL HYPERTENSION: ICD-10-CM

## 2025-05-14 RX ORDER — LISINOPRIL 40 MG/1
40 TABLET ORAL DAILY
Qty: 30 TABLET | Refills: 5 | Status: SHIPPED | OUTPATIENT
Start: 2025-05-14

## 2025-05-17 DIAGNOSIS — I10 PRIMARY HYPERTENSION: ICD-10-CM

## 2025-05-17 NOTE — TELEPHONE ENCOUNTER
Medication Refill Request       Medication:   carvedilol (COREG) 12.5 mg tablet     Dose/Frequency: 25 mg / 2 times daily with meals    Quantity: 400 tablet (100 day supply)    Pharmacy: [facility]    Office:   [x] PCP/Provider - Ally Diaz, DO  [] Specialty/Provider -     Does the patient have enough for 3 days?   [x] Yes   [] No - Send as HP to POD    Is the patient completely out of the medication or does not have enough until the next business day?  [] Yes - send to [facility]  [x] No - Send as HP to POD

## 2025-05-18 RX ORDER — CARVEDILOL 12.5 MG/1
25 TABLET ORAL 2 TIMES DAILY WITH MEALS
Qty: 400 TABLET | Refills: 0 | Status: SHIPPED | OUTPATIENT
Start: 2025-05-18

## 2025-06-12 DIAGNOSIS — I10 ESSENTIAL HYPERTENSION: ICD-10-CM

## 2025-06-12 RX ORDER — AMLODIPINE BESYLATE 10 MG/1
10 TABLET ORAL DAILY
Qty: 90 TABLET | Refills: 1 | Status: SHIPPED | OUTPATIENT
Start: 2025-06-12

## 2025-07-10 DIAGNOSIS — E11.00 HYPEROSMOLAR NON-KETOTIC STATE IN PATIENT WITH TYPE 2 DIABETES MELLITUS (HCC): ICD-10-CM

## 2025-07-11 RX ORDER — GLIMEPIRIDE 2 MG/1
2 TABLET ORAL
Qty: 30 TABLET | Refills: 0 | Status: SHIPPED | OUTPATIENT
Start: 2025-07-11

## 2025-07-11 NOTE — TELEPHONE ENCOUNTER
Patient needs updated blood work and has previously placed orders. Please contact patient to go for labs. Courtesy refill provided.    Patient needs an appointment. Please contact the patient to schedule an appointment. Last office visit: 12-, courtesy refill given

## 2025-07-17 DIAGNOSIS — E11.00 HYPEROSMOLAR NON-KETOTIC STATE IN PATIENT WITH TYPE 2 DIABETES MELLITUS (HCC): ICD-10-CM

## 2025-07-17 NOTE — TELEPHONE ENCOUNTER
Reason for call:   [x] Refill   [] Prior Auth  [] Other:     Office:   [x] PCP/Provider - Ally Diaz,   [] Specialty/Provider -     Medication: glimepiride (AMARYL) 2 mg     Dose/Frequency: 1 daily with breakfast    Quantity: 90    Pharmacy: [facility]   Does the patient have enough for 3 days? unknown  [] Yes   [] No - Send as HP to POD    Mail Away Pharmacy   Does the patient have enough for 10 days?   [] Yes   [] No - Send as HP to POD

## 2025-07-18 RX ORDER — GLIMEPIRIDE 2 MG/1
2 TABLET ORAL
Qty: 30 TABLET | Refills: 0 | OUTPATIENT
Start: 2025-07-18

## 2025-08-13 ENCOUNTER — OFFICE VISIT (OUTPATIENT)
Dept: FAMILY MEDICINE CLINIC | Facility: CLINIC | Age: 70
End: 2025-08-13
Payer: COMMERCIAL

## 2025-08-13 PROCEDURE — 82043 UR ALBUMIN QUANTITATIVE: CPT | Performed by: STUDENT IN AN ORGANIZED HEALTH CARE EDUCATION/TRAINING PROGRAM

## 2025-08-13 PROCEDURE — 82570 ASSAY OF URINE CREATININE: CPT | Performed by: STUDENT IN AN ORGANIZED HEALTH CARE EDUCATION/TRAINING PROGRAM

## (undated) DEVICE — RUNTHROUGH NS EXTRA FLOPPY PTCA GUIDEWIRE: Brand: RUNTHROUGH

## (undated) DEVICE — CATH GUIDE LAUNCHER 6FR EBU 3.5

## (undated) DEVICE — NC TREK CORONARY DILATATION CATHETER 2.75 MM X 12 MM / RAPID-EXCHANGE: Brand: NC TREK

## (undated) DEVICE — GLIDESHEATH SLENDER STAINLESS STEEL KIT: Brand: GLIDESHEATH SLENDER

## (undated) DEVICE — NEEDLE PERCUTANEOUS 21G X 4CM

## (undated) DEVICE — CATH DIAG 5FR IMPULSE 100CM FR4

## (undated) DEVICE — MINI TREK CORONARY DILATATION CATHETER 2.0 MM X 12 MM / RAPID-EXCHANGE: Brand: MINI TREK

## (undated) DEVICE — DGW .035 FC J3MM 260CM TEF: Brand: EMERALD

## (undated) DEVICE — TR BAND RADIAL ARTERY COMPRESSION DEVICE: Brand: TR BAND

## (undated) DEVICE — CATH DIAG 5FR IMPULSE 100CM FL3.5